# Patient Record
Sex: FEMALE | Race: WHITE | NOT HISPANIC OR LATINO | Employment: PART TIME | URBAN - METROPOLITAN AREA
[De-identification: names, ages, dates, MRNs, and addresses within clinical notes are randomized per-mention and may not be internally consistent; named-entity substitution may affect disease eponyms.]

---

## 2017-12-04 ENCOUNTER — HOSPITAL ENCOUNTER (OUTPATIENT)
Dept: RADIOLOGY | Facility: MEDICAL CENTER | Age: 72
End: 2017-12-04
Attending: FAMILY MEDICINE
Payer: MEDICARE

## 2017-12-04 DIAGNOSIS — Z12.31 ENCOUNTER FOR SCREENING MAMMOGRAM FOR MALIGNANT NEOPLASM OF BREAST: ICD-10-CM

## 2017-12-04 PROCEDURE — G0202 SCR MAMMO BI INCL CAD: HCPCS

## 2017-12-08 ENCOUNTER — HOSPITAL ENCOUNTER (OUTPATIENT)
Dept: RADIOLOGY | Facility: MEDICAL CENTER | Age: 72
End: 2017-12-08
Attending: FAMILY MEDICINE
Payer: MEDICARE

## 2017-12-08 DIAGNOSIS — R92.8 ABNORMAL MAMMOGRAM: ICD-10-CM

## 2017-12-08 PROCEDURE — G0206 DX MAMMO INCL CAD UNI: HCPCS | Mod: RT

## 2018-09-14 ENCOUNTER — HOSPITAL ENCOUNTER (OUTPATIENT)
Dept: RADIOLOGY | Facility: MEDICAL CENTER | Age: 73
End: 2018-09-14
Attending: FAMILY MEDICINE
Payer: MEDICARE

## 2018-09-14 DIAGNOSIS — M25.551 RIGHT HIP PAIN: ICD-10-CM

## 2018-09-14 PROCEDURE — 73502 X-RAY EXAM HIP UNI 2-3 VIEWS: CPT | Mod: RT

## 2018-09-18 ENCOUNTER — HOSPITAL ENCOUNTER (OUTPATIENT)
Dept: RADIOLOGY | Facility: MEDICAL CENTER | Age: 73
End: 2018-09-18
Attending: FAMILY MEDICINE
Payer: MEDICARE

## 2018-09-18 DIAGNOSIS — N95.0 POSTMENOPAUSAL BLEEDING: ICD-10-CM

## 2018-09-18 PROCEDURE — 76830 TRANSVAGINAL US NON-OB: CPT

## 2018-11-28 ENCOUNTER — APPOINTMENT (OUTPATIENT)
Dept: RADIOLOGY | Facility: MEDICAL CENTER | Age: 73
End: 2018-11-28
Attending: EMERGENCY MEDICINE
Payer: MEDICARE

## 2018-11-28 ENCOUNTER — HOSPITAL ENCOUNTER (EMERGENCY)
Facility: MEDICAL CENTER | Age: 73
End: 2018-11-29
Attending: EMERGENCY MEDICINE
Payer: MEDICARE

## 2018-11-28 DIAGNOSIS — S70.02XA CONTUSION OF LEFT HIP, INITIAL ENCOUNTER: ICD-10-CM

## 2018-11-28 DIAGNOSIS — S63.502A SPRAIN OF LEFT WRIST, INITIAL ENCOUNTER: ICD-10-CM

## 2018-11-28 PROCEDURE — 73552 X-RAY EXAM OF FEMUR 2/>: CPT | Mod: LT

## 2018-11-28 PROCEDURE — 99284 EMERGENCY DEPT VISIT MOD MDM: CPT

## 2018-11-28 PROCEDURE — 73130 X-RAY EXAM OF HAND: CPT | Mod: LT

## 2018-11-28 PROCEDURE — 700102 HCHG RX REV CODE 250 W/ 637 OVERRIDE(OP): Performed by: EMERGENCY MEDICINE

## 2018-11-28 PROCEDURE — A9270 NON-COVERED ITEM OR SERVICE: HCPCS | Performed by: EMERGENCY MEDICINE

## 2018-11-28 PROCEDURE — 73110 X-RAY EXAM OF WRIST: CPT | Mod: LT

## 2018-11-28 RX ORDER — ACETAMINOPHEN 325 MG/1
1000 TABLET ORAL ONCE
Status: COMPLETED | OUTPATIENT
Start: 2018-11-28 | End: 2018-11-28

## 2018-11-28 RX ADMIN — ACETAMINOPHEN 975 MG: 325 TABLET, FILM COATED ORAL at 22:46

## 2018-11-28 ASSESSMENT — PAIN SCALES - GENERAL: PAINLEVEL_OUTOF10: 9

## 2018-11-29 VITALS
TEMPERATURE: 99.1 F | HEIGHT: 63 IN | SYSTOLIC BLOOD PRESSURE: 134 MMHG | OXYGEN SATURATION: 98 % | BODY MASS INDEX: 24.41 KG/M2 | WEIGHT: 137.79 LBS | DIASTOLIC BLOOD PRESSURE: 64 MMHG | RESPIRATION RATE: 18 BRPM | HEART RATE: 70 BPM

## 2018-11-29 PROCEDURE — 72192 CT PELVIS W/O DYE: CPT

## 2018-11-29 ASSESSMENT — ENCOUNTER SYMPTOMS
NECK PAIN: 0
EYE REDNESS: 0
FALLS: 1
COUGH: 0
SEIZURES: 0
HEADACHES: 0
VOMITING: 0
FEVER: 0
SORE THROAT: 0
CHILLS: 0
FOCAL WEAKNESS: 0
SHORTNESS OF BREATH: 0
BACK PAIN: 0
BLURRED VISION: 0
ABDOMINAL PAIN: 0

## 2018-11-29 NOTE — PROGRESS NOTES
Patient given DC instructions and verbalized understanding. Patient wheeled out of ED in good condition.

## 2018-11-29 NOTE — DISCHARGE INSTRUCTIONS
You were seen in the Emergency Department for hip and wrist injury.    Xrays, CT scan were completed without significant acute abnormalities.    Please use 1,000mg of tylenol or 600mg of ibuprofen every 6 hours as needed for pain.    Please follow up with your primary care physician.    Return to the Emergency Department with worsening pain, inability to ambulate after 1-2 days, numbness or weakness in the lower extremity, or other concerns.

## 2018-11-29 NOTE — ED PROVIDER NOTES
ED Provider Note    CHIEF COMPLAINT  Chief Complaint   Patient presents with   • GLF       HPI  Alice Whitlock is a 73 y.o. female who presents with left hip and left wrist pain after mechanical ground-level fall this evening.  Patient states that she tripped on a piece of furniture which caused her to fall down onto her left side.  She denies head strike or loss of consciousness.  No headaches or chest pain before or after the fall.  No history of syncope.  She endorses an aching pain to her left hip and groin which radiates down the left leg.  She states she is having significant difficulty bearing weight on the left lower extremity.  She is not taking any medications prior to coming in for evaluation.      REVIEW OF SYSTEMS  See HPI for further details.   Review of Systems   Constitutional: Negative for chills and fever.   HENT: Negative for sore throat.    Eyes: Negative for blurred vision and redness.   Respiratory: Negative for cough and shortness of breath.    Cardiovascular: Negative for chest pain and leg swelling.   Gastrointestinal: Negative for abdominal pain and vomiting.   Genitourinary: Negative for dysuria and urgency.   Musculoskeletal: Positive for falls and joint pain. Negative for back pain and neck pain.   Skin: Negative for rash.   Neurological: Negative for focal weakness, seizures and headaches.   Psychiatric/Behavioral: Negative for suicidal ideas.         PAST MEDICAL HISTORY   has a past medical history of Arthritis; Cancer ([facility]) (2015); Heart burn; Pain; Psychiatric problem; Unspecified disorder of thyroid; and Unspecified urinary incontinence.    SOCIAL HISTORY  Social History     Social History Main Topics   • Smoking status: Never Smoker   • Smokeless tobacco: Never Used   • Alcohol use Yes      Comment: 1 per month   • Drug use: No   • Sexual activity: Not on file       SURGICAL HISTORY   has a past surgical history that includes other surgical procedure (1979); thyroidectomy  "total (1990); bladder suspension (2000); mammoplasty augmentation (1981); laparotomy (1972); tonsillectomy (1957); knee arthroscopy (1/18/2011); medial meniscectomy (1/18/2011); meniscectomy (1/18/2011); synovectomy (1/18/2011); knee arthroplasty total (11/6/2012); knee arthroplasty total (Left, 10/11/2016); and enlarge breast with implant (1980).    CURRENT MEDICATIONS  Home Medications    **Home medications have not yet been reviewed for this encounter**         ALLERGIES  Allergies   Allergen Reactions   • Food      Oranges, peaches   • Penicillins      Reaction unknown \"fever\"       PHYSICAL EXAM   VITAL SIGNS: /64   Pulse 70   Temp 37.3 °C (99.1 °F) (Temporal)   Resp 18   Ht 1.6 m (5' 3\")   Wt 62.5 kg (137 lb 12.6 oz)   SpO2 98%   BMI 24.41 kg/m²      Physical Exam   Constitutional: She is oriented to person, place, and time and well-developed, well-nourished, and in no distress. No distress.   Well-appearing elderly female   HENT:   Head: Normocephalic and atraumatic.   Eyes: Pupils are equal, round, and reactive to light. Conjunctivae are normal.   Neck: Normal range of motion. Neck supple.   Cardiovascular: Normal rate, regular rhythm and normal heart sounds.    2+ DP pulses bilaterally   Pulmonary/Chest: Effort normal and breath sounds normal. No respiratory distress.   Abdominal: Soft. She exhibits no distension. There is no tenderness.   Musculoskeletal: Normal range of motion. She exhibits no edema or tenderness.   Left hip atraumatic, overall good range of motion without significant pain.  Left wrist atraumatic, overall good range of motion without significant pain.   Neurological: She is alert and oriented to person, place, and time.   Moving all extremities spontaneously.  Motor and sensation intact distal to injury.   Skin: Skin is warm and dry.   Psychiatric: Affect normal.         DIAGNOSTIC STUDIES      RADIOLOGY  Personally reviewed by me  CT-PELVIS W/O   Final Result         1.  No " acute abnormality.   2.  Diverticulosis      DX-FEMUR-2+ LEFT   Final Result         1.  No radiographic evidence of acute traumatic injury.      DX-WRIST-COMPLETE 3+ LEFT   Final Result         1.  No acute traumatic bony injury.   2.  Severe degenerative changes of the first carpometacarpal joint      DX-HAND 3+ LEFT   Final Result         1.  No acute traumatic bony injury.   2.  Severe degenerative changes of the first carpometacarpal joint.          ED COURSE  Vitals:    11/28/18 2025 11/28/18 2247 11/29/18 0100 11/29/18 0144   BP: 143/72 (!) 162/60  134/64   Pulse: 75 83 71 70   Resp: 18 18 18   Temp:       TempSrc:       SpO2: 97% 94% 95% 98%   Weight:       Height:             Medications administered:  Medications   acetaminophen (TYLENOL) tablet 975 mg (975 mg Oral Given 11/28/18 2246)       MEDICAL DECISION MAKING  Relatively healthy elderly female who presents with left wrist and left hip pain after a ground-level mechanical fall this evening.  She is hyper tensive with otherwise reassuring vitals.  There is no history of head trauma or concern for intracranial hemorrhage or C-spine fracture.  No evidence of neurovascular compromise on exam.  X-rays do not demonstrate fracture dislocation of the left wrist or left hip or femur.  CT scan of the pelvis was completed as patient continued to have trouble bearing weight on the left lower extremity.  There is no evidence of occult pelvic fracture.  Suspect likely contusion or ligamentous injury.  Patient understands plan of care for discharge home.  She understands instructions on symptomatic care and if symptoms are not improving and she continues to have difficulty ambulating, she needs follow-up with her primary care physician or orthopedist for MRI of the left hip.  She also understands strict return precautions for changing or worsening symptoms.        IMPRESSION  (S70.02XA) Contusion of left hip, initial encounter  (S63.502A) Sprain of left wrist,  initial encounter    Disposition: Discharge home, stable condition  Results, diagnoses, and treatment options were discussed with the patient and/or family. Patient verbalized understanding of plan of care.    Patient referred to primary care provider for monitoring and treatment of blood pressure.      Discharge Medication List as of 11/29/2018  1:24 AM              Electronically signed by: Bee Westbrook, 11/28/2018 10:37 PM

## 2018-11-29 NOTE — ED TRIAGE NOTES
PAtient wheeled in by  for L medial upper thigh pain after she tripped at home and landed on her outstretched L hand and hip. Patient is unable to bear weight without much pain. Denies head injury, LOC, n/v, or blood thinners.

## 2018-12-13 ENCOUNTER — HOSPITAL ENCOUNTER (OUTPATIENT)
Dept: RADIOLOGY | Facility: MEDICAL CENTER | Age: 73
End: 2018-12-13
Attending: FAMILY MEDICINE
Payer: MEDICARE

## 2018-12-13 DIAGNOSIS — Z12.39 SCREENING BREAST EXAMINATION: ICD-10-CM

## 2018-12-13 PROCEDURE — 77067 SCR MAMMO BI INCL CAD: CPT

## 2018-12-18 ENCOUNTER — HOSPITAL ENCOUNTER (OUTPATIENT)
Dept: RADIOLOGY | Facility: MEDICAL CENTER | Age: 73
End: 2018-12-18
Attending: FAMILY MEDICINE
Payer: MEDICARE

## 2018-12-18 DIAGNOSIS — M25.552 LEFT HIP PAIN: ICD-10-CM

## 2018-12-18 PROCEDURE — 73502 X-RAY EXAM HIP UNI 2-3 VIEWS: CPT | Mod: LT

## 2019-01-14 ENCOUNTER — HOSPITAL ENCOUNTER (OUTPATIENT)
Dept: RADIOLOGY | Facility: MEDICAL CENTER | Age: 74
End: 2019-01-14
Attending: SPECIALIST | Admitting: SPECIALIST
Payer: MEDICARE

## 2019-01-14 DIAGNOSIS — Z01.810 PRE-OPERATIVE CARDIOVASCULAR EXAMINATION: ICD-10-CM

## 2019-01-14 DIAGNOSIS — Z01.811 PRE-OPERATIVE RESPIRATORY EXAMINATION: ICD-10-CM

## 2019-01-14 DIAGNOSIS — Z01.812 PRE-OPERATIVE LABORATORY EXAMINATION: ICD-10-CM

## 2019-01-14 LAB
ABO GROUP BLD: NORMAL
ALBUMIN SERPL BCP-MCNC: 4.5 G/DL (ref 3.2–4.9)
ALBUMIN/GLOB SERPL: 1.6 G/DL
ALP SERPL-CCNC: 95 U/L (ref 30–99)
ALT SERPL-CCNC: 18 U/L (ref 2–50)
ANION GAP SERPL CALC-SCNC: 6 MMOL/L (ref 0–11.9)
APTT PPP: 40.5 SEC (ref 24.7–36)
AST SERPL-CCNC: 19 U/L (ref 12–45)
BASOPHILS # BLD AUTO: 1.5 % (ref 0–1.8)
BASOPHILS # BLD: 0.09 K/UL (ref 0–0.12)
BILIRUB SERPL-MCNC: 0.6 MG/DL (ref 0.1–1.5)
BLD GP AB SCN SERPL QL: NORMAL
BUN SERPL-MCNC: 18 MG/DL (ref 8–22)
CALCIUM SERPL-MCNC: 10.1 MG/DL (ref 8.5–10.5)
CHLORIDE SERPL-SCNC: 102 MMOL/L (ref 96–112)
CO2 SERPL-SCNC: 27 MMOL/L (ref 20–33)
CREAT SERPL-MCNC: 0.71 MG/DL (ref 0.5–1.4)
EOSINOPHIL # BLD AUTO: 0.22 K/UL (ref 0–0.51)
EOSINOPHIL NFR BLD: 3.7 % (ref 0–6.9)
ERYTHROCYTE [DISTWIDTH] IN BLOOD BY AUTOMATED COUNT: 43.3 FL (ref 35.9–50)
GLOBULIN SER CALC-MCNC: 2.8 G/DL (ref 1.9–3.5)
GLUCOSE SERPL-MCNC: 108 MG/DL (ref 65–99)
HCT VFR BLD AUTO: 36.3 % (ref 37–47)
HGB BLD-MCNC: 11.7 G/DL (ref 12–16)
IMM GRANULOCYTES # BLD AUTO: 0.01 K/UL (ref 0–0.11)
IMM GRANULOCYTES NFR BLD AUTO: 0.2 % (ref 0–0.9)
INR PPP: 0.94 (ref 0.87–1.13)
LYMPHOCYTES # BLD AUTO: 1.34 K/UL (ref 1–4.8)
LYMPHOCYTES NFR BLD: 22.3 % (ref 22–41)
MCH RBC QN AUTO: 31.4 PG (ref 27–33)
MCHC RBC AUTO-ENTMCNC: 32.2 G/DL (ref 33.6–35)
MCV RBC AUTO: 97.3 FL (ref 81.4–97.8)
MONOCYTES # BLD AUTO: 0.51 K/UL (ref 0–0.85)
MONOCYTES NFR BLD AUTO: 8.5 % (ref 0–13.4)
NEUTROPHILS # BLD AUTO: 3.84 K/UL (ref 2–7.15)
NEUTROPHILS NFR BLD: 63.8 % (ref 44–72)
NRBC # BLD AUTO: 0 K/UL
NRBC BLD-RTO: 0 /100 WBC
PLATELET # BLD AUTO: 236 K/UL (ref 164–446)
PMV BLD AUTO: 9.4 FL (ref 9–12.9)
POTASSIUM SERPL-SCNC: 3.9 MMOL/L (ref 3.6–5.5)
PROT SERPL-MCNC: 7.3 G/DL (ref 6–8.2)
PROTHROMBIN TIME: 12.6 SEC (ref 12–14.6)
RBC # BLD AUTO: 3.73 M/UL (ref 4.2–5.4)
RH BLD: NORMAL
SODIUM SERPL-SCNC: 135 MMOL/L (ref 135–145)
WBC # BLD AUTO: 6 K/UL (ref 4.8–10.8)

## 2019-01-14 PROCEDURE — 36415 COLL VENOUS BLD VENIPUNCTURE: CPT

## 2019-01-14 PROCEDURE — 80053 COMPREHEN METABOLIC PANEL: CPT

## 2019-01-14 PROCEDURE — 93005 ELECTROCARDIOGRAM TRACING: CPT

## 2019-01-14 PROCEDURE — 86850 RBC ANTIBODY SCREEN: CPT

## 2019-01-14 PROCEDURE — 86901 BLOOD TYPING SEROLOGIC RH(D): CPT

## 2019-01-14 PROCEDURE — 85025 COMPLETE CBC W/AUTO DIFF WBC: CPT

## 2019-01-14 PROCEDURE — 93010 ELECTROCARDIOGRAM REPORT: CPT | Performed by: INTERNAL MEDICINE

## 2019-01-14 PROCEDURE — 85730 THROMBOPLASTIN TIME PARTIAL: CPT

## 2019-01-14 PROCEDURE — 71045 X-RAY EXAM CHEST 1 VIEW: CPT

## 2019-01-14 PROCEDURE — 86900 BLOOD TYPING SEROLOGIC ABO: CPT

## 2019-01-14 PROCEDURE — 85610 PROTHROMBIN TIME: CPT

## 2019-01-14 RX ORDER — GABAPENTIN 300 MG/1
300 CAPSULE ORAL EVERY EVENING
COMMUNITY
End: 2022-01-06 | Stop reason: SDUPTHER

## 2019-01-15 LAB — EKG IMPRESSION: NORMAL

## 2019-01-17 ENCOUNTER — HOSPITAL ENCOUNTER (OUTPATIENT)
Facility: MEDICAL CENTER | Age: 74
End: 2019-01-17
Attending: SPECIALIST | Admitting: SPECIALIST
Payer: MEDICARE

## 2019-01-17 VITALS
OXYGEN SATURATION: 100 % | BODY MASS INDEX: 24.3 KG/M2 | HEART RATE: 78 BPM | HEIGHT: 62 IN | DIASTOLIC BLOOD PRESSURE: 56 MMHG | TEMPERATURE: 99.2 F | WEIGHT: 132.06 LBS | RESPIRATION RATE: 16 BRPM | SYSTOLIC BLOOD PRESSURE: 137 MMHG

## 2019-01-17 LAB
ABO GROUP BLD: NORMAL
PATHOLOGY CONSULT NOTE: NORMAL
RH BLD: NORMAL

## 2019-01-17 PROCEDURE — 502714 HCHG ROBOTIC SURGERY SERVICES: Performed by: SPECIALIST

## 2019-01-17 PROCEDURE — 88305 TISSUE EXAM BY PATHOLOGIST: CPT

## 2019-01-17 PROCEDURE — 700111 HCHG RX REV CODE 636 W/ 250 OVERRIDE (IP)

## 2019-01-17 PROCEDURE — 160031 HCHG SURGERY MINUTES - 1ST 30 MINS LEVEL 5: Performed by: SPECIALIST

## 2019-01-17 PROCEDURE — 700102 HCHG RX REV CODE 250 W/ 637 OVERRIDE(OP): Performed by: ANESTHESIOLOGY

## 2019-01-17 PROCEDURE — 88307 TISSUE EXAM BY PATHOLOGIST: CPT

## 2019-01-17 PROCEDURE — 160002 HCHG RECOVERY MINUTES (STAT): Performed by: SPECIALIST

## 2019-01-17 PROCEDURE — 500854 HCHG NEEDLE, INSUFFLATION FOR STEP: Performed by: SPECIALIST

## 2019-01-17 PROCEDURE — 88309 TISSUE EXAM BY PATHOLOGIST: CPT

## 2019-01-17 PROCEDURE — 700104 HCHG RX REV CODE 254

## 2019-01-17 PROCEDURE — 88360 TUMOR IMMUNOHISTOCHEM/MANUAL: CPT | Mod: 91

## 2019-01-17 PROCEDURE — 700111 HCHG RX REV CODE 636 W/ 250 OVERRIDE (IP): Performed by: ANESTHESIOLOGY

## 2019-01-17 PROCEDURE — 160035 HCHG PACU - 1ST 60 MINS PHASE I: Performed by: SPECIALIST

## 2019-01-17 PROCEDURE — 160009 HCHG ANES TIME/MIN: Performed by: SPECIALIST

## 2019-01-17 PROCEDURE — 88331 PATH CONSLTJ SURG 1 BLK 1SPC: CPT

## 2019-01-17 PROCEDURE — 502779 HCHG SUTURE, QUILL: Performed by: SPECIALIST

## 2019-01-17 PROCEDURE — 160025 RECOVERY II MINUTES (STATS): Performed by: SPECIALIST

## 2019-01-17 PROCEDURE — 501582 HCHG TROCAR, THRD BLADED: Performed by: SPECIALIST

## 2019-01-17 PROCEDURE — 160048 HCHG OR STATISTICAL LEVEL 1-5: Performed by: SPECIALIST

## 2019-01-17 PROCEDURE — A9270 NON-COVERED ITEM OR SERVICE: HCPCS | Performed by: ANESTHESIOLOGY

## 2019-01-17 PROCEDURE — 700111 HCHG RX REV CODE 636 W/ 250 OVERRIDE (IP): Performed by: SPECIALIST

## 2019-01-17 PROCEDURE — 160046 HCHG PACU - 1ST 60 MINS PHASE II: Performed by: SPECIALIST

## 2019-01-17 PROCEDURE — 500864 HCHG NEEDLE, SPINAL 18G: Performed by: SPECIALIST

## 2019-01-17 PROCEDURE — 700101 HCHG RX REV CODE 250

## 2019-01-17 PROCEDURE — 160036 HCHG PACU - EA ADDL 30 MINS PHASE I: Performed by: SPECIALIST

## 2019-01-17 PROCEDURE — 88112 CYTOPATH CELL ENHANCE TECH: CPT

## 2019-01-17 PROCEDURE — 501838 HCHG SUTURE GENERAL: Performed by: SPECIALIST

## 2019-01-17 PROCEDURE — 160042 HCHG SURGERY MINUTES - EA ADDL 1 MIN LEVEL 5: Performed by: SPECIALIST

## 2019-01-17 RX ORDER — GABAPENTIN 300 MG/1
300 CAPSULE ORAL ONCE
Status: DISCONTINUED | OUTPATIENT
Start: 2019-01-17 | End: 2019-01-17 | Stop reason: HOSPADM

## 2019-01-17 RX ORDER — METOPROLOL TARTRATE 1 MG/ML
1 INJECTION, SOLUTION INTRAVENOUS
Status: DISCONTINUED | OUTPATIENT
Start: 2019-01-17 | End: 2019-01-17 | Stop reason: HOSPADM

## 2019-01-17 RX ORDER — HALOPERIDOL 5 MG/ML
1 INJECTION INTRAMUSCULAR
Status: DISCONTINUED | OUTPATIENT
Start: 2019-01-17 | End: 2019-01-17 | Stop reason: HOSPADM

## 2019-01-17 RX ORDER — BUPIVACAINE HYDROCHLORIDE AND EPINEPHRINE 2.5; 5 MG/ML; UG/ML
INJECTION, SOLUTION EPIDURAL; INFILTRATION; INTRACAUDAL; PERINEURAL
Status: DISCONTINUED | OUTPATIENT
Start: 2019-01-17 | End: 2019-01-17 | Stop reason: HOSPADM

## 2019-01-17 RX ORDER — OXYCODONE HCL 5 MG/5 ML
5 SOLUTION, ORAL ORAL
Status: COMPLETED | OUTPATIENT
Start: 2019-01-17 | End: 2019-01-17

## 2019-01-17 RX ORDER — HYDRALAZINE HYDROCHLORIDE 20 MG/ML
5 INJECTION INTRAMUSCULAR; INTRAVENOUS
Status: DISCONTINUED | OUTPATIENT
Start: 2019-01-17 | End: 2019-01-17 | Stop reason: HOSPADM

## 2019-01-17 RX ORDER — ROSUVASTATIN CALCIUM 10 MG/1
10 TABLET, COATED ORAL EVERY EVENING
COMMUNITY
End: 2022-01-06

## 2019-01-17 RX ORDER — HYDROMORPHONE HYDROCHLORIDE 1 MG/ML
0.2 INJECTION, SOLUTION INTRAMUSCULAR; INTRAVENOUS; SUBCUTANEOUS
Status: DISCONTINUED | OUTPATIENT
Start: 2019-01-17 | End: 2019-01-17 | Stop reason: HOSPADM

## 2019-01-17 RX ORDER — OXYCODONE HCL 5 MG/5 ML
10 SOLUTION, ORAL ORAL
Status: COMPLETED | OUTPATIENT
Start: 2019-01-17 | End: 2019-01-17

## 2019-01-17 RX ORDER — ACETAMINOPHEN 500 MG
1000 TABLET ORAL ONCE
Status: DISCONTINUED | OUTPATIENT
Start: 2019-01-17 | End: 2019-01-17 | Stop reason: HOSPADM

## 2019-01-17 RX ORDER — MIDAZOLAM HYDROCHLORIDE 1 MG/ML
1 INJECTION INTRAMUSCULAR; INTRAVENOUS
Status: DISCONTINUED | OUTPATIENT
Start: 2019-01-17 | End: 2019-01-17 | Stop reason: HOSPADM

## 2019-01-17 RX ORDER — SODIUM CHLORIDE, SODIUM LACTATE, POTASSIUM CHLORIDE, CALCIUM CHLORIDE 600; 310; 30; 20 MG/100ML; MG/100ML; MG/100ML; MG/100ML
INJECTION, SOLUTION INTRAVENOUS CONTINUOUS
Status: DISCONTINUED | OUTPATIENT
Start: 2019-01-17 | End: 2019-01-17 | Stop reason: HOSPADM

## 2019-01-17 RX ORDER — HYDROMORPHONE HYDROCHLORIDE 1 MG/ML
0.4 INJECTION, SOLUTION INTRAMUSCULAR; INTRAVENOUS; SUBCUTANEOUS
Status: DISCONTINUED | OUTPATIENT
Start: 2019-01-17 | End: 2019-01-17 | Stop reason: HOSPADM

## 2019-01-17 RX ORDER — SODIUM CHLORIDE, SODIUM LACTATE, POTASSIUM CHLORIDE, CALCIUM CHLORIDE 600; 310; 30; 20 MG/100ML; MG/100ML; MG/100ML; MG/100ML
INJECTION, SOLUTION INTRAVENOUS ONCE
Status: COMPLETED | OUTPATIENT
Start: 2019-01-17 | End: 2019-01-17

## 2019-01-17 RX ORDER — INDOCYANINE GREEN AND WATER 25 MG
KIT INJECTION
Status: DISCONTINUED | OUTPATIENT
Start: 2019-01-17 | End: 2019-01-17 | Stop reason: HOSPADM

## 2019-01-17 RX ORDER — ONDANSETRON 2 MG/ML
4 INJECTION INTRAMUSCULAR; INTRAVENOUS
Status: COMPLETED | OUTPATIENT
Start: 2019-01-17 | End: 2019-01-17

## 2019-01-17 RX ORDER — HYDROMORPHONE HYDROCHLORIDE 1 MG/ML
0.1 INJECTION, SOLUTION INTRAMUSCULAR; INTRAVENOUS; SUBCUTANEOUS
Status: DISCONTINUED | OUTPATIENT
Start: 2019-01-17 | End: 2019-01-17 | Stop reason: HOSPADM

## 2019-01-17 RX ADMIN — ONDANSETRON 4 MG: 2 INJECTION INTRAMUSCULAR; INTRAVENOUS at 14:53

## 2019-01-17 RX ADMIN — SODIUM CHLORIDE, SODIUM LACTATE, POTASSIUM CHLORIDE, CALCIUM CHLORIDE: 600; 310; 30; 20 INJECTION, SOLUTION INTRAVENOUS at 09:31

## 2019-01-17 RX ADMIN — OXYCODONE HYDROCHLORIDE 10 MG: 5 SOLUTION ORAL at 13:55

## 2019-01-17 RX ADMIN — LIDOCAINE HYDROCHLORIDE 0.5 ML: 10 INJECTION, SOLUTION EPIDURAL; INFILTRATION; INTRACAUDAL; PERINEURAL at 09:31

## 2019-01-17 ASSESSMENT — PAIN SCALES - GENERAL
PAINLEVEL_OUTOF10: 7
PAINLEVEL_OUTOF10: 4
PAINLEVEL_OUTOF10: 5
PAINLEVEL_OUTOF10: 2
PAINLEVEL_OUTOF10: ASSUMED PAIN PRESENT
PAINLEVEL_OUTOF10: 5

## 2019-01-17 NOTE — PROGRESS NOTES
Patient in pre-op, assessment completed, patient and family updated on plan of care, all questions answered, no further needs at this time, call light within reach.

## 2019-01-17 NOTE — OR NURSING
Pt arrived to phase II. Pt states that she feels the urge to use the restroom. Pt up to the bathroom but not able to void. Pulse ox moved to ear with better readings. IV fluids infusing, pt resting with the lights low in the reclining chair.  at bedside, POC discussed.

## 2019-01-17 NOTE — DISCHARGE INSTRUCTIONS
ACTIVITY: Rest and take it easy for the first 24 hours.  A responsible adult is recommended to remain with you during that time.  It is normal to feel sleepy.  We encourage you to not do anything that requires balance, judgment or coordination.    MILD FLU-LIKE SYMPTOMS ARE NORMAL. YOU MAY EXPERIENCE GENERALIZED MUSCLE ACHES, THROAT IRRITATION, HEADACHE AND/OR SOME NAUSEA.    FOR 24 HOURS DO NOT:  Drive, operate machinery or run household appliances.  Drink beer or alcoholic beverages.   Make important decisions or sign legal documents.    SPECIAL INSTRUCTIONS:     Pelvic rest for 6 weeks.  No heavy lifting for 6 weeks.  May shower tomorrow.  May removed dressings tomorrow and replace with band-aids daily as needed.   Keep incisions clean and dry.      Laparoscopically Assisted Vaginal Hysterectomy  A laparoscopically assisted vaginal hysterectomy (LAVH) is a surgical procedure to remove the uterus and cervix, and sometimes the ovaries and fallopian tubes. During an LAVH, some of the surgical removal is done through the vagina, and the rest is done through a few small surgical cuts (incisions) in the abdomen.  This procedure is usually considered in women when a vaginal hysterectomy is not an option. Your health care provider will discuss the risks and benefits of the different surgical techniques at your appointment. Generally, recovery time is faster and there are fewer complications after laparoscopic procedures than after open incisional procedures.  Tell a health care provider about:  · Any allergies you have.  · All medicines you are taking, including vitamins, herbs, eye drops, creams, and over-the-counter medicines.  · Any problems you or family members have had with anesthetic medicines.  · Any blood disorders you have.  · Any surgeries you have had.  · Any medical conditions you have.  What are the risks?  Generally, this is a safe procedure. However, as with any procedure, complications can occur.  Possible complications include:  · Allergies to medicines.  · Difficulty breathing.  · Bleeding.  · Infection.  · Damage to other structures near your uterus and cervix.  What happens before the procedure?  · Ask your health care provider about changing or stopping your regular medicines.  · Take certain medicines, such as a colon-emptying preparation, as directed.  · Do not eat or drink anything for at least 8 hours before your surgery.  · Stop smoking if you smoke. Stopping will improve your health after surgery.  · Arrange for a ride home after surgery and for help at home during recovery.  What happens during the procedure?  · An IV tube will be put into one of your veins in order to give you fluids and medicines.  · You will receive medicines to relax you and medicines that make you sleep (general anesthetic).  · You may have a flexible tube (catheter) put into your bladder to drain urine.  · You may have a tube put through your nose or mouth that goes into your stomach (nasogastric tube). The nasogastric tube removes digestive fluids and prevents you from feeling nauseated and from vomiting.  · Tight-fitting (compression) stockings will be placed on your legs to promote circulation.  · Three to four small incisions will be made in your abdomen. An incision also will be made in your vagina. Probes and tools will be inserted into the small incisions. The uterus and cervix are removed (and possibly your ovaries and fallopian tubes) through your vagina as well as through the small incisions that were made in the abdomen.  · Your vagina is then sewn back to normal.  What happens after the procedure?  · You may have a liquid diet temporarily. You will most likely return to, and tolerate, your usual diet the day after surgery.  · You will be passing urine through a catheter. It will be removed the day after surgery.  · Your temperature, breathing rate, heart rate, blood pressure, and oxygen level will be monitored  regularly.  · You will still wear compression stockings on your legs until you are able to move around.  · You will use a special device or do breathing exercises to keep your lungs clear.  · You will be encouraged to walk as soon as possible.      DIET: To avoid nausea, slowly advance diet as tolerated, avoiding spicy or greasy foods for the first day.  Add more substantial food to your diet according to your physician's instructions.  INCREASE FLUIDS AND FIBER TO AVOID CONSTIPATION.    SURGICAL DRESSING/BATHING: Follow instructions given to you by MD. Call with any questions.     FOLLOW-UP APPOINTMENT:  A follow-up appointment should be arranged with your doctor in 1-2 weeks; call to schedule.    You should CALL YOUR PHYSICIAN if you develop:  Fever greater than 101 degrees F.  Pain not relieved by medication, or persistent nausea or vomiting.  Excessive bleeding (blood soaking through dressing) or unexpected drainage from the wound.  Extreme redness or swelling around the incision site, drainage of pus or foul smelling drainage.  Inability to urinate or empty your bladder within 8 hours.  Problems with breathing or chest pain.    You should call 911 if you develop problems with breathing or chest pain.  If you are unable to contact your doctor or surgical center, you should go to the nearest emergency room or urgent care center.  Physician's telephone #: [phone]    If any questions arise, call your doctor.  If your doctor is not available, please feel free to call the Surgical Center at [phone].  The Center is open Monday through Friday from 7AM to 7PM.  You can also call the [facility] HOTLINE open 24 hours/day, 7 days/week and speak to a nurse at [phone], or toll free at [phone].    A registered nurse may call you a few days after your surgery to see how you are doing after your procedure.    MEDICATIONS: Resume taking daily medication.  Take prescribed pain medication with food.  If no  medication is prescribed, you may take non-aspirin pain medication if needed.  PAIN MEDICATION CAN BE VERY CONSTIPATING.  Take a stool softener or laxative such as senokot, pericolace, or milk of magnesia if needed.    Prescriptions at home.  Last pain medication given at 2:00pm.    If your physician has prescribed pain medication that includes Acetaminophen (Tylenol), do not take additional Acetaminophen (Tylenol) while taking the prescribed medication.    Depression / Suicide Risk    As you are discharged from this [facility] facility, it is important to learn how to keep safe from harming yourself.    Recognize the warning signs:  · Abrupt changes in personality, positive or negative- including increase in energy   · Giving away possessions  · Change in eating patterns- significant weight changes-  positive or negative  · Change in sleeping patterns- unable to sleep or sleeping all the time   · Unwillingness or inability to communicate  · Depression  · Unusual sadness, discouragement and loneliness  · Talk of wanting to die  · Neglect of personal appearance   · Rebelliousness- reckless behavior  · Withdrawal from people/activities they love  · Confusion- inability to concentrate     If you or a loved one observes any of these behaviors or has concerns about self-harm, here's what you can do:  · Talk about it- your feelings and reasons for harming yourself  · Remove any means that you might use to hurt yourself (examples: pills, rope, extension cords, firearm)  · Get professional help from the community (Mental Health, Substance Abuse, psychological counseling)  · Do not be alone:Call your Safe Contact- someone whom you trust who will be there for you.  · Call your local CRISIS HOTLINE 304-6911 or 129-331-3323  · Call your local Children's Mobile Crisis Response Team [place] (859) 537-6133 or www.NetSpark  · Call the toll free National Suicide Prevention Hotlines   · National Suicide Prevention  Lifeline 922-952-TZOS (8692)  · National [place] Network 800-SUICIDE (149-7309)

## 2019-01-17 NOTE — OR SURGEON
Immediate Post OP Note    PreOp Diagnosis: grade 1 endometrial cancer     PostOp Diagnosis: same    Procedure(s):  HYSTERECTOMY ROBOTIC XI - Wound Class: Clean Contaminated  SALPINGECTOMY - Wound Class: Clean Contaminated  OOPHORECTOMY - Wound Class: Clean Contaminated  NODE BIOPSY SENTINEL - Wound Class: Clean Contaminated    Surgeon(s):  Shahid Paez M.D.    Anesthesiologist/Type of Anesthesia:  Anesthesiologist: Gerard Calles D.O./General    Surgical Staff:  Circulator: Earlene Morrow R.N.  Relief Circulator: Shwetha Sy R.N.  Relief Scrub: Shwetha Mitchell  Scrub Person: Naga Juárez R.N.; Lizz Johnson    Specimens removed if any:  ID Type Source Tests Collected by Time Destination   A : uterus, bilateral tubes and ovaries, cervix Tissue Uterus PATHOLOGY SPECIMEN Shahid Paez M.D. 1/17/2019 12:56 PM    B : left obturator sentinel lymph node Tissue Lymph Node PATHOLOGY SPECIMEN Shahid Paez M.D. 1/17/2019 12:56 PM    C : right external iliac lymph node Tissue Lymph Node PATHOLOGY SPECIMEN Shahid Paez M.D. 1/17/2019  1:19 PM        Estimated Blood Loss: 50 cc    Findings: normal uterus and adnexa, left obturator and right external iliac node    Complications: none        1/17/2019 1:48 PM Shahid Paez M.D.

## 2019-01-18 NOTE — OR NURSING
Pt able to void without difficulty. D/C instructions given to pt and family, verbalized understanding. PIV removed prior to D/C.

## 2019-02-13 ENCOUNTER — HOSPITAL ENCOUNTER (OUTPATIENT)
Dept: RADIOLOGY | Facility: MEDICAL CENTER | Age: 74
End: 2019-02-13
Attending: SPECIALIST
Payer: MEDICARE

## 2019-02-13 DIAGNOSIS — C54.1 ENDOMETRIAL SARCOMA (HCC): ICD-10-CM

## 2019-02-13 PROCEDURE — 700117 HCHG RX CONTRAST REV CODE 255: Performed by: SPECIALIST

## 2019-02-13 PROCEDURE — 74177 CT ABD & PELVIS W/CONTRAST: CPT

## 2019-02-13 RX ADMIN — IOHEXOL 100 ML: 350 INJECTION, SOLUTION INTRAVENOUS at 14:12

## 2019-02-13 RX ADMIN — IOHEXOL 50 ML: 240 INJECTION, SOLUTION INTRATHECAL; INTRAVASCULAR; INTRAVENOUS; ORAL at 14:12

## 2019-02-20 NOTE — OP REPORT
DATE OF SERVICE:  01/17/2019    PREOPERATIVE DIAGNOSIS:  Grade I endometrial adenocarcinoma.    POSTOPERATIVE DIAGNOSIS:  Grade I endometrial adenocarcinoma.    PROCEDURE PERFORMED:  1.  Robotic-assisted hysterectomy with bilateral salpingo-oophorectomy.  2.  Robotic-assisted sentinel lymph node sampling.  3.  Bayard lymph node mapping.    SURGEON:  Shahid Paez MD    ASSISTANT:  Emma Michaels PA-C    ANESTHESIA:  General.    ANESTHESIOLOGIST:  Gerard Calles DO    ESTIMATED BLOOD LOSS:  Minimal.    FLUIDS:  Per Dr. Calles.    URINE OUTPUT:  As per Dr. Calles.    COMPLICATIONS:  None.    COUNTS:  Final sponge and needle counts correct.    INDICATIONS FOR SURGERY:  The patient is a very pleasant 73-year-old female   referred to me because of the recent diagnosis of grade I endometrial   carcinoma.  Patient was advised to undergo definitive surgical therapy.  In   addition, patient was advised to undergo sentinel lymph node sampling   depending upon intraoperative findings and consent was taken as clinically   indicated.  Risks, benefits, and rationale of the procedures were reviewed   with the patient in detail.  Patient is understanding of these risks and   wished to proceed with the surgery as planned.    INTRAOPERATIVE FINDINGS:  1.  No evidence of ascites.  2.  Normal right and left diaphragm.  Liver capsule smooth.  Stomach appeared   grossly normal.  Abdominal peritoneal surfaces were unremarkable.  Omentum   appeared grossly normal.  3.  In the pelvis, uterus was of normal size.  The adnexal structures were   unremarkable.  There was no seeding along the bladder, pelvic and cul-de-sac   peritoneum.    Bayard lymph node mapping revealed that the sentinel lymph node on the left   side was noted to be in the left obturator fossa.  While on the right side,   right external iliac lymph node, this was asymmetric.    DESCRIPTION OF PROCEDURE:  Patient was given IV antibiotics prior to   procedure.   Patient was prepped and draped and placed in modified dorsal   lithotomy position.  We initially began with sentinel lymph node mapping.  I   performed the examination under anesthesia.  Heavy-weighted speculum was   placed in the posterior vagina.  Zurita retractor was placed in the anterior   vagina.  The anterior lip of the cervix was brought through with a   single-tooth tenaculum.  I then injected 2 mL of indocyanine ___ at 3 and 9   o'clock position, depth of about 2-3 mm.  After completion of this, I then   turned my focus towards the robotic portion.    A 1 cm supraumbilical incision was made.  A Veress needle was inserted without   difficulty.  Pneumoperitoneum was achieved to the abdominal pressure of 15   mmHg.  A 12 mm trocar was inserted without difficulty.  After completion of   this, a 12 mm trocar was placed in the left lower quadrant two fingerbreadths   medial to the anterior superior iliac spine under direct laparoscopic   visualization.  After completion of this, a laparoscope was then placed in the   left lower quadrant port to assist in the placement of the remainder of the   da Ángel ports.  Two 8 mm ports were placed in the right upper quadrant 8 cm   apart while one 8 mm port was placed in the left upper quadrant 8 cm apart.    After completion of this, the patient was placed in steep Trendelenburg   position.  The robotic system was then docked and after docking the robotic   system, the instrumentation was inserted under direct laparoscopic   visualization to insure that there was no injury to the abdominal contents.    Once this was completed, the robotic camera was then docked.  We then   proceeded with our da Ángel portion of the procedure.    After completion of this, I did open up the right and left posterior broad   leaf ligament.  The perivesical and perirectal spaces were respectively   created.  Firefly was then turned on and I then identified the sentinel lymph   node, which was  harvested, skeletonized and resected and sent for pathological   analysis.  While waiting for that, I proceeded on with the hysterectomy with   bilateral salpingo-oophorectomy.    The posterior leaf of the broad ligament was incised.  The avascular space of   Graves was then created.  The right and left pelvic ureters were identified.    The ovarian vessels were then subsequently isolated and bipolar cautery was   used to cauterize the right and left IP and subsequently divided.  The medial   leaf of the peritoneum was then incised down to the level of the uterosacral   ligament.  Ureters were dissected laterally.  Uterine artery and vein were   then subsequently skeletonized.  Using the bipolar cautery, the uterine artery   and vein were then cauterized juxtaposition to the fundus of the uterus.  The   remainder of the lower uterine segment was likewise divided.  The uterosacral   ligaments were then independently divided.  Great care was then taken to   clearly not injure the ureter.  After the uterosacral ligaments were then   divided, the anterior branches of the uterine vessels were then subsequently   skeletonized and cauterized with bipolar cautery and divided.  The bladder   peritoneum was then subsequently taken down.  Once we were assured that the   bladder was dissected off the paracervical fascia, an anterior colpotomy was   made.  The vagina was circumferentially incised to complete the hysterectomy   and BSO.  The specimen was removed through the vaginal vault without   difficulty.  The vaginal cuff was then closed with O Quill PDS suture in 2   layer running fashion.    After completion of this, we then copiously irrigated pelvis with water.    Hemostasis was established.  ___ pathology.  After completion of this, the   pathology report showed no obvious invasive cancer, thus we did not proceed   with surgical staging.    At this point in time, we counted for sponges, needles and instrument counts.     Once this was accounted for, robotic system was then de-docked.    Pneumoperitoneum was allowed to escape through the 8 mm port.  Subcutaneous   fat was copiously irrigated with water.  The skin was reapproximated with 3-0   Monocryl sutures.    The patient tolerated the procedure well without any difficulties and was   subsequently transferred to the PACU in stable condition, extubated.       ____________________________________     MD SAMIRA BRUMFIELD / NTS    DD:  02/20/2019 00:22:12  DT:  02/20/2019 03:32:30    D#:  [id number]  Job#:  [id number]    cc: VENTURA CORCORAN MD

## 2019-03-05 ENCOUNTER — HOSPITAL ENCOUNTER (OUTPATIENT)
Dept: RADIATION ONCOLOGY | Facility: MEDICAL CENTER | Age: 74
End: 2019-03-31
Attending: RADIOLOGY
Payer: MEDICARE

## 2019-03-05 VITALS
OXYGEN SATURATION: 97 % | WEIGHT: 134.9 LBS | DIASTOLIC BLOOD PRESSURE: 64 MMHG | HEART RATE: 65 BPM | BODY MASS INDEX: 24.67 KG/M2 | TEMPERATURE: 98.9 F | SYSTOLIC BLOOD PRESSURE: 129 MMHG

## 2019-03-05 PROCEDURE — 99205 OFFICE O/P NEW HI 60 MIN: CPT | Mod: 25 | Performed by: RADIOLOGY

## 2019-03-05 PROCEDURE — 49411 INS MARK ABD/PEL FOR RT PERQ: CPT | Performed by: RADIOLOGY

## 2019-03-05 PROCEDURE — 99214 OFFICE O/P EST MOD 30 MIN: CPT | Mod: 25 | Performed by: RADIOLOGY

## 2019-03-05 PROCEDURE — A4648 IMPLANTABLE TISSUE MARKER: HCPCS | Performed by: RADIOLOGY

## 2019-03-05 ASSESSMENT — PAIN SCALES - GENERAL: PAINLEVEL: 1=MINIMAL PAIN

## 2019-03-05 NOTE — CONSULTS
RADIATION ONCOLOGY CONSULT    DATE OF SERVICE: 3/5/2019    IDENTIFICATION: A 73 y.o. female with stage Ib, grade 1, endometrial adenocarcinoma.  She is here at the kind request of Dr. Alonso for consideration of adjuvant vaginal brachytherapy.    HISTORY OF PRESENT ILLNESS: Patient presented with vaginal spotting over a years duration.  She was referred for GYN evaluation and underwent transvaginal ultrasound followed by endometrial biopsy.  Biopsy demonstrated well-differentiated endometrioid adenocarcinoma grade 1.  She was subsequently referred to Dr. Paez recommended hysterectomy and performed a robotic assisted hysterectomy, bilateral salpingo-oophorectomy, and sentinel node biopsy.  Pathology demonstrated Grade 1 endometrioid adenocarcinoma with deep myometrial invasion involving 66% of the myometrium.  Focal lymphovascular invasion was identified.  3 pelvic lymph nodes were without evidence of metastatic disease.  Tumor was ER TX +95 and 75% respectively.    She is now 7 weeks postop overall doing well.  Denies any bowel or bladder dysfunction.  She denies vaginal bleeding.    PAST MEDICAL HISTORY:   Past Medical History:   Diagnosis Date   • Arthritis     osteoarthritis   • Cancer (HCC) 2015    skin (non melanoma)   • Heart burn     occasional   • LBBB (left bundle branch block)     no cardiologist    • Pain     knee,  joints   • Psychiatric problem     depression   • Unspecified disorder of thyroid     thyroid nodules=thyroidectomy   • Unspecified urinary incontinence        PAST SURGICAL HISTORY:  Past Surgical History:   Procedure Laterality Date   • HYSTERECTOMY ROBOTIC XI  1/17/2019    Procedure: HYSTERECTOMY ROBOTIC XI;  Surgeon: Shahid Paez M.D.;  Location: [facility];  Service: Gynecology   • SALPINGECTOMY Bilateral 1/17/2019    Procedure: SALPINGECTOMY;  Surgeon: Shahid Paez M.D.;  Location: [facility];  Service: Gynecology   • OOPHORECTOMY Bilateral 1/17/2019    Procedure:  OOPHORECTOMY;  Surgeon: Shahid Paez M.D.;  Location: [facility];  Service: Gynecology   • NODE BIOPSY SENTINEL  1/17/2019    Procedure: NODE BIOPSY SENTINEL;  Surgeon: Shahid Paez M.D.;  Location: SUR[facility]ERY [facility];  Service: Gynecology   • KNEE ARTHROPLASTY TOTAL Left 10/11/2016    Procedure: KNEE ARTHROPLASTY TOTAL;  Surgeon: Beverly Chauhan M.D.;  Location: [facility];  Service:    • KNEE ARTHROPLASTY TOTAL  11/6/2012    Performed by Beverly Chauhan M.D. at [facility]   • KNEE ARTHROSCOPY  1/18/2011    Performed by MARILEE NAGEL at [facility]   • MEDIAL MENISCECTOMY  1/18/2011    Performed by MARILEE NAGEL at [facility]   • MENISCECTOMY  1/18/2011    Performed by MARILEE NAGEL at [facility]   • SYNOVECTOMY  1/18/2011    Performed by MARILEE NAGEL at [facility]   • BLADDER SUSPENSION  2000   • THYROIDECTOMY TOTAL  1990   • MAMMOPLASTY AUGMENTATION  1981   • PB ENLARGE BREAST WITH IMPLANT  1980   • OTHER SURGICAL PROCEDURE  1979    excision thyroid nodules   • LAPAROTOMY  1972    endometriosis   • TONSILLECTOMY  1957       CURRENT MEDICATIONS:  Current Outpatient Prescriptions   Medication Sig Dispense Refill   • rosuvastatin (CRESTOR) 10 MG Tab Take 10 mg by mouth every evening.     • gabapentin (NEURONTIN) 300 MG Cap Take 300 mg by mouth every evening.     • Coenzyme Q10 (COQ-10) 100 MG Cap Take 1 Cap by mouth every day.     • MAGNESIUM-POTASSIUM PO Take 1 Tab by mouth every day. 300/40mg   With potasssium     • Calcium Acetate, Phos Binder, (CALCIUM ACETATE PO) Take 1 Tab by mouth every day.     • Cyanocobalamin (VITAMIN B-12 PO) Take 1 Tab by mouth every day.     • meloxicam (MOBIC) 15 MG tablet Take 15 mg by mouth every day.     • vitamin D, Ergocalciferol, (DRISDOL) 79092 UNITS Cap capsule Take 50,000 Units by mouth every 7 days.     • levothyroxine (LEVOXYL) 100 MCG TABS Take 100 mcg by  mouth every day.     • sertraline (ZOLOFT) 50 MG TABS Take 50 mg by mouth every day.       No current facility-administered medications for this encounter.        ALLERGIES:    Penicillins    FAMILY HISTORY:    family history includes Cancer in her maternal aunt and unknown relative; Heart Disease in her unknown relative; Hypertension in her unknown relative.    SOCIAL HISTORY:     reports that she has never smoked. She has never used smokeless tobacco. She reports that she drinks alcohol. She reports that she does not use drugs.    REVIEW OF SYSTEMS:  Review of systems for today's date of service was reviewed and uploaded into the electronic medical record.       GYNECOLOGICAL STATUS:  : 1, Para: 1, Number of Interrupted Pregnancies: 0 and Age at first birth: 18    HORMONE USE:  Contraceptive hormone use 5 years and Post-menopause use 1 month years    PHYSICAL EXAM:   ECOG PERFORMANCE STATUS:  0= Fully active, able to carry on all pre-disease performance without restriction.  /64   Pulse 65   Temp 37.2 °C (98.9 °F)   Wt 61.2 kg (134 lb 14.4 oz)   SpO2 97%   BMI 24.67 kg/m²    GENERAL: Alert, oriented, no acute distress.  HEENT:  Pupils are equal, round, and reactive to light.  Extraocular muscles   are intact. Sclerae nonicteric.  Conjunctivae pink.  Oral cavity, tongue   protrudes midline.   NECK:  Supple without evidence of thyromegaly.  NODES:  No peripheral adenopathy of the neck, supraclavicular fossa or axillae   bilaterally.  LUNGS:  Clear to ascultation and resonant to percussion.  HEART:  Regular rate and rhythm.  No murmur appreciated  ABDOMEN:  Soft. No evidence of hepatosplenomegaly.  Positive bowel sounds.  EXTREMITIES:  Without Edema.  PELVIC: Vaginal mucosa pink vaginal cuff intact no palpable or visible abnormality.  3 gold fiducial markers inserted into the vaginal cuff to aid in the placement of vaginal brachytherapy.  NEUROLOGIC:  Cranial nerves II through XII were intact.   Strength is 5/5 in   lower extremities bilaterally.  DTRs were symmetrical.  There was no focal   sensory deficit appreciated.    LABORATORY DATA:   Lab Results   Component Value Date/Time    SODIUM 135 01/14/2019 11:05 AM    POTASSIUM 3.9 01/14/2019 11:05 AM    CHLORIDE 102 01/14/2019 11:05 AM    CO2 27 01/14/2019 11:05 AM    GLUCOSE 108 (H) 01/14/2019 11:05 AM    BUN 18 01/14/2019 11:05 AM    CREATININE 0.71 01/14/2019 11:05 AM     Lab Results   Component Value Date/Time    ALKPHOSPHAT 95 01/14/2019 11:05 AM    ASTSGOT 19 01/14/2019 11:05 AM    ALTSGPT 18 01/14/2019 11:05 AM    TBILIRUBIN 0.6 01/14/2019 11:05 AM      Lab Results   Component Value Date/Time    WBC 6.0 01/14/2019 11:05 AM    RBC 3.73 (L) 01/14/2019 11:05 AM    HEMOGLOBIN 11.7 (L) 01/14/2019 11:05 AM    HEMATOCRIT 36.3 (L) 01/14/2019 11:05 AM    MCV 97.3 01/14/2019 11:05 AM    MCH 31.4 01/14/2019 11:05 AM    MCHC 32.2 (L) 01/14/2019 11:05 AM    MPV 9.4 01/14/2019 11:05 AM    NEUTSPOLYS 63.80 01/14/2019 11:05 AM    LYMPHOCYTES 22.30 01/14/2019 11:05 AM    MONOCYTES 8.50 01/14/2019 11:05 AM    EOSINOPHILS 3.70 01/14/2019 11:05 AM    BASOPHILS 1.50 01/14/2019 11:05 AM        RADIOLOGY DATA:  Ct-abdomen-pelvis With    Result Date: 2/13/2019 2/13/2019 1:48 PM HISTORY/REASON FOR EXAM:  Recent diagnosis of endometrial carcinoma TECHNIQUE/EXAM DESCRIPTION: CT scan of the abdomen and pelvis with contrast. Contrast-enhanced helical scanning was obtained from the diaphragmatic domes through the pubic symphysis following the bolus administration of 100 mL of nonionic contrast without complication. Low dose optimization technique was utilized for this CT exam including automated exposure control and adjustment of the mA and/or kV according to patient size. COMPARISON: 11/29/2018. FINDINGS: There is left basilar atelectasis or scarring. Calcified bilateral breast implants are partially visualized. The enhanced liver, gallbladder, pancreas, spleen, adrenals and  kidneys are within normal limits. There is no free air or fluid. There are scattered colonic diverticula without discrete evidence of diverticulitis. The large and small bowel are otherwise unremarkable. The urinary bladder is unremarkable. The uterus and adnexal structures appear to be surgically absent. There is no free pelvic fluid. There is no pelvic, retroperitoneal or mesenteric adenopathy. There are no suspicious osseous lesions.     1.  No evidence of metastatic disease. 2.  Diverticulosis without evidence of diverticulitis. 3.  Status post hysterectomy.      IMPRESSION:    A 73 y.o. with stage Ib endometrioid adenocarcinoma with deep myometrial invasion and focal lymphovascular invasion.    RECOMMENDATIONS:   Reviewed pathology findings with patient.  Considering the adverse features previously described and her age, she is at risk for recurrence at the vaginal cuff of approximately 20%.  With vaginal brachytherapy that recurrence risk can be decreased to less than 5%.  Vaginal brachytherapy will involve delivering 2750 cGy in 5 fractions over 2-1/2 weeks to cuff using a cylinder and remote high dose rate afterloader.  The technical aspects benefits risks associated with vaginal cuff brachytherapy were reviewed with patient.  She understands would like to proceed.  She will return for planning on March 7 with treatment anticipated to get started on March 8 and completed by March 21.    One hour was spent face-to-face with patient in the office and more than half of that time was spent counseling patient or coordinating care as described above.    Thank you for the opportunity to participate in her care.  If any questions or comments, please do not hesitate in calling.    Masood KLEIN M.D.  Electronically signed by: Masood Perkins V, 3/5/2019 2:17 PM  [phone]

## 2019-03-06 ENCOUNTER — PATIENT OUTREACH (OUTPATIENT)
Dept: OTHER | Facility: MEDICAL CENTER | Age: 74
End: 2019-03-06

## 2019-03-06 NOTE — PROGRESS NOTES
Telephone call to pt. Introduced myself and the NN role and provided contact information. Reviewed barriers to care with pt, which she denied.  Also denied questions and concerns.  Encouraged to call with needs or if she has questions.

## 2019-03-07 ENCOUNTER — HOSPITAL ENCOUNTER (OUTPATIENT)
Dept: RADIATION ONCOLOGY | Facility: MEDICAL CENTER | Age: 74
End: 2019-03-07

## 2019-03-07 PROCEDURE — 77334 RADIATION TREATMENT AID(S): CPT | Mod: 26 | Performed by: RADIOLOGY

## 2019-03-07 PROCEDURE — 77334 RADIATION TREATMENT AID(S): CPT | Performed by: RADIOLOGY

## 2019-03-07 PROCEDURE — 77263 THER RADIOLOGY TX PLNG CPLX: CPT | Performed by: RADIOLOGY

## 2019-03-07 PROCEDURE — 77470 SPECIAL RADIATION TREATMENT: CPT | Performed by: RADIOLOGY

## 2019-03-07 PROCEDURE — 57156 INS VAG BRACHYTX DEVICE: CPT | Performed by: RADIOLOGY

## 2019-03-07 PROCEDURE — 77290 THER RAD SIMULAJ FIELD CPLX: CPT | Performed by: RADIOLOGY

## 2019-03-07 PROCEDURE — 77470 SPECIAL RADIATION TREATMENT: CPT | Mod: 26 | Performed by: RADIOLOGY

## 2019-03-07 PROCEDURE — 77290 THER RAD SIMULAJ FIELD CPLX: CPT | Mod: 26 | Performed by: RADIOLOGY

## 2019-03-07 PROCEDURE — 77332 RADIATION TREATMENT AID(S): CPT | Mod: XU | Performed by: RADIOLOGY

## 2019-03-08 ENCOUNTER — HOSPITAL ENCOUNTER (OUTPATIENT)
Dept: RADIATION ONCOLOGY | Facility: MEDICAL CENTER | Age: 74
End: 2019-03-08

## 2019-03-08 PROCEDURE — 57156 INS VAG BRACHYTX DEVICE: CPT | Performed by: RADIOLOGY

## 2019-03-08 PROCEDURE — 77295 3-D RADIOTHERAPY PLAN: CPT | Mod: 26 | Performed by: RADIOLOGY

## 2019-03-08 PROCEDURE — 77280 THER RAD SIMULAJ FIELD SMPL: CPT | Performed by: RADIOLOGY

## 2019-03-08 PROCEDURE — 77332 RADIATION TREATMENT AID(S): CPT | Performed by: RADIOLOGY

## 2019-03-08 PROCEDURE — 77295 3-D RADIOTHERAPY PLAN: CPT | Performed by: RADIOLOGY

## 2019-03-08 PROCEDURE — 77770 HDR RDNCL NTRSTL/ICAV BRCHTX: CPT | Mod: 26 | Performed by: RADIOLOGY

## 2019-03-08 PROCEDURE — 77770 HDR RDNCL NTRSTL/ICAV BRCHTX: CPT | Performed by: RADIOLOGY

## 2019-03-08 PROCEDURE — 77370 RADIATION PHYSICS CONSULT: CPT | Performed by: RADIOLOGY

## 2019-03-08 PROCEDURE — C1717 BRACHYTX, NON-STR,HDR IR-192: HCPCS | Performed by: RADIOLOGY

## 2019-03-12 ENCOUNTER — HOSPITAL ENCOUNTER (OUTPATIENT)
Dept: RADIATION ONCOLOGY | Facility: MEDICAL CENTER | Age: 74
End: 2019-03-12

## 2019-03-12 PROCEDURE — 77332 RADIATION TREATMENT AID(S): CPT | Performed by: RADIOLOGY

## 2019-03-12 PROCEDURE — 57156 INS VAG BRACHYTX DEVICE: CPT | Performed by: RADIOLOGY

## 2019-03-12 PROCEDURE — 77280 THER RAD SIMULAJ FIELD SMPL: CPT | Mod: 26 | Performed by: RADIOLOGY

## 2019-03-12 PROCEDURE — 77770 HDR RDNCL NTRSTL/ICAV BRCHTX: CPT | Performed by: RADIOLOGY

## 2019-03-12 PROCEDURE — 77770 HDR RDNCL NTRSTL/ICAV BRCHTX: CPT | Mod: 26 | Performed by: RADIOLOGY

## 2019-03-12 PROCEDURE — 77280 THER RAD SIMULAJ FIELD SMPL: CPT | Performed by: RADIOLOGY

## 2019-03-12 PROCEDURE — C1717 BRACHYTX, NON-STR,HDR IR-192: HCPCS | Performed by: RADIOLOGY

## 2019-03-14 ENCOUNTER — HOSPITAL ENCOUNTER (OUTPATIENT)
Dept: RADIATION ONCOLOGY | Facility: MEDICAL CENTER | Age: 74
End: 2019-03-14

## 2019-03-15 ENCOUNTER — HOSPITAL ENCOUNTER (OUTPATIENT)
Dept: RADIATION ONCOLOGY | Facility: MEDICAL CENTER | Age: 74
End: 2019-03-15

## 2019-03-15 PROCEDURE — 57156 INS VAG BRACHYTX DEVICE: CPT | Performed by: RADIOLOGY

## 2019-03-15 PROCEDURE — 77332 RADIATION TREATMENT AID(S): CPT | Performed by: RADIOLOGY

## 2019-03-15 PROCEDURE — C1717 BRACHYTX, NON-STR,HDR IR-192: HCPCS | Performed by: RADIOLOGY

## 2019-03-15 PROCEDURE — 77770 HDR RDNCL NTRSTL/ICAV BRCHTX: CPT | Mod: 26 | Performed by: RADIOLOGY

## 2019-03-15 PROCEDURE — 77336 RADIATION PHYSICS CONSULT: CPT | Mod: XU | Performed by: RADIOLOGY

## 2019-03-15 PROCEDURE — 77280 THER RAD SIMULAJ FIELD SMPL: CPT | Mod: 26 | Performed by: RADIOLOGY

## 2019-03-15 PROCEDURE — 77280 THER RAD SIMULAJ FIELD SMPL: CPT | Performed by: RADIOLOGY

## 2019-03-15 PROCEDURE — 77770 HDR RDNCL NTRSTL/ICAV BRCHTX: CPT | Performed by: RADIOLOGY

## 2019-03-19 ENCOUNTER — HOSPITAL ENCOUNTER (OUTPATIENT)
Dept: RADIATION ONCOLOGY | Facility: MEDICAL CENTER | Age: 74
End: 2019-03-19

## 2019-03-19 PROCEDURE — 77770 HDR RDNCL NTRSTL/ICAV BRCHTX: CPT | Mod: 26 | Performed by: RADIOLOGY

## 2019-03-19 PROCEDURE — 77280 THER RAD SIMULAJ FIELD SMPL: CPT | Mod: 26 | Performed by: RADIOLOGY

## 2019-03-19 PROCEDURE — 77770 HDR RDNCL NTRSTL/ICAV BRCHTX: CPT | Performed by: RADIOLOGY

## 2019-03-19 PROCEDURE — 57156 INS VAG BRACHYTX DEVICE: CPT | Performed by: RADIOLOGY

## 2019-03-19 PROCEDURE — 77332 RADIATION TREATMENT AID(S): CPT | Performed by: RADIOLOGY

## 2019-03-19 PROCEDURE — 77280 THER RAD SIMULAJ FIELD SMPL: CPT | Performed by: RADIOLOGY

## 2019-03-19 PROCEDURE — C1717 BRACHYTX, NON-STR,HDR IR-192: HCPCS | Performed by: RADIOLOGY

## 2019-03-21 ENCOUNTER — HOSPITAL ENCOUNTER (OUTPATIENT)
Dept: RADIATION ONCOLOGY | Facility: MEDICAL CENTER | Age: 74
End: 2019-03-21

## 2019-03-21 PROCEDURE — 77280 THER RAD SIMULAJ FIELD SMPL: CPT | Mod: 26 | Performed by: RADIOLOGY

## 2019-03-21 PROCEDURE — 57156 INS VAG BRACHYTX DEVICE: CPT | Performed by: RADIOLOGY

## 2019-03-21 PROCEDURE — 77332 RADIATION TREATMENT AID(S): CPT | Performed by: RADIOLOGY

## 2019-03-21 PROCEDURE — 77770 HDR RDNCL NTRSTL/ICAV BRCHTX: CPT | Performed by: RADIOLOGY

## 2019-03-21 PROCEDURE — 77770 HDR RDNCL NTRSTL/ICAV BRCHTX: CPT | Mod: 26 | Performed by: RADIOLOGY

## 2019-03-21 PROCEDURE — 77280 THER RAD SIMULAJ FIELD SMPL: CPT | Performed by: RADIOLOGY

## 2019-03-21 PROCEDURE — C1717 BRACHYTX, NON-STR,HDR IR-192: HCPCS | Performed by: RADIOLOGY

## 2019-05-16 ENCOUNTER — HOSPITAL ENCOUNTER (OUTPATIENT)
Dept: RADIATION ONCOLOGY | Facility: MEDICAL CENTER | Age: 74
End: 2019-05-31
Attending: RADIOLOGY
Payer: MEDICARE

## 2019-05-16 VITALS
SYSTOLIC BLOOD PRESSURE: 124 MMHG | HEART RATE: 87 BPM | WEIGHT: 133.3 LBS | BODY MASS INDEX: 24.38 KG/M2 | DIASTOLIC BLOOD PRESSURE: 76 MMHG | OXYGEN SATURATION: 98 % | TEMPERATURE: 98.8 F

## 2019-05-16 PROCEDURE — 99212 OFFICE O/P EST SF 10 MIN: CPT | Performed by: RADIOLOGY

## 2019-05-16 ASSESSMENT — PAIN SCALES - GENERAL: PAINLEVEL: NO PAIN

## 2019-05-16 NOTE — NON-PROVIDER
Patient was seen today in clinic with Dr. Perkins for follow up.  Vitals signs and weight were obtained and pain assessment was completed.  Allergies and medications were reviewed with the patient.  Review of systems completed.     Vitals/Pain:  Vitals:    05/16/19 1022   BP: 124/76   Pulse: 87   Temp: 37.1 °C (98.8 °F)   SpO2: 98%   Weight: 60.5 kg (133 lb 4.8 oz)   Pain Score: No pain        Allergies:   Penicillins    Current Medications:  Current Outpatient Prescriptions   Medication Sig Dispense Refill   • rosuvastatin (CRESTOR) 10 MG Tab Take 10 mg by mouth every evening.     • gabapentin (NEURONTIN) 300 MG Cap Take 300 mg by mouth every evening.     • Coenzyme Q10 (COQ-10) 100 MG Cap Take 1 Cap by mouth every day.     • MAGNESIUM-POTASSIUM PO Take 1 Tab by mouth every day. 300/40mg   With potasssium     • Calcium Acetate, Phos Binder, (CALCIUM ACETATE PO) Take 1 Tab by mouth every day.     • Cyanocobalamin (VITAMIN B-12 PO) Take 1 Tab by mouth every day.     • meloxicam (MOBIC) 15 MG tablet Take 15 mg by mouth every day.     • vitamin D, Ergocalciferol, (DRISDOL) 28085 UNITS Cap capsule Take 50,000 Units by mouth every 7 days.     • levothyroxine (LEVOXYL) 100 MCG TABS Take 100 mcg by mouth every day.     • sertraline (ZOLOFT) 50 MG TABS Take 50 mg by mouth every day.       No current facility-administered medications for this encounter.          PCP:  Yan Lomax Ass't

## 2019-05-16 NOTE — PROGRESS NOTES
RADIATION ONCOLOGY FOLLOW-UP    DATE OF SERVICE: 5/16/2019    IDENTIFICATION:   A 73 y.o. female with Malignant neoplasm of endometrium, Stg IB, C54.1 - Malignant neoplasm of endometrium    Cell Histology Category: Endometrioid; Type: Endometrioid adenocarcinoma, NOS; Grade I - Well differentiated       PRESCRIPTION:  Course ID Plan ID Rx Dose (cGy) Fraction Status   C1_VBT [id number]_[id number] 2,750 2 / 5 Completed Early   C1_VBT [id number] 1,650 3 / 3 Treatment Approved       TREATMENT SUMMARY:    Course: C1_VBT    Treatment Site Ref. ID Energy Dose/Fx (cGy) #Fx Dose Correction (cGy) Total Dose (cGy) Start Date End Date Elapsed Days   Cyl_05_30 [id number]_05  550 2 / 5 0 1,100 3/8/2019 3/12/2019 4   Cyl_05_30 Cylinder_05  550 3 / 3 0 1,650 3/15/2019 3/21/2019 6     .      HISTORY OF PRESENT ILLNESS:   Follow-up visit after completion of vaginal cuff brachii therapy for stage Ib endometrial cancer.  Overall feels well no vaginal, bladder, or rectal complaints.  In fact she states that her bowels have normalized were previously she had problems with constipation.    PROBLEM LIST:  Patient Active Problem List   Diagnosis   • OA (osteoarthritis) of knee   • Left knee pain       CURRENT MEDICATIONS:  Current Outpatient Prescriptions   Medication Sig Dispense Refill   • rosuvastatin (CRESTOR) 10 MG Tab Take 10 mg by mouth every evening.     • gabapentin (NEURONTIN) 300 MG Cap Take 300 mg by mouth every evening.     • Coenzyme Q10 (COQ-10) 100 MG Cap Take 1 Cap by mouth every day.     • MAGNESIUM-POTASSIUM PO Take 1 Tab by mouth every day. 300/40mg   With potasssium     • Calcium Acetate, Phos Binder, (CALCIUM ACETATE PO) Take 1 Tab by mouth every day.     • Cyanocobalamin (VITAMIN B-12 PO) Take 1 Tab by mouth every day.     • meloxicam (MOBIC) 15 MG tablet Take 15 mg by mouth every day.     • vitamin D, Ergocalciferol, (DRISDOL) 09766 UNITS Cap capsule Take 50,000 Units by mouth every 7 days.     • levothyroxine (LEVOXYL) 100 MCG  TABS Take 100 mcg by mouth every day.     • sertraline (ZOLOFT) 50 MG TABS Take 50 mg by mouth every day.       No current facility-administered medications for this encounter.        ALLERGIES:  Penicillins    REVIEW OF SYSTEMS:  A review of systems for today's date of service was reviewed and uploaded into the electronic medical record.    PHYSICAL EXAM:   /76   Pulse 87   Temp 37.1 °C (98.8 °F)   Wt 60.5 kg (133 lb 4.8 oz)   SpO2 98%   BMI 24.38 kg/m²   GENERAL: Alert, oriented, well-developed, well-nourished, no acute distress.  HEENT:  Pupils are equal, round, and reactive to light.  Extraocular muscles   are intact. Sclerae nonicteric.  Conjunctivae pink.  Oral cavity, tongue   protrudes midline.   NECK:  Supple without evidence of thyromegaly.  NODES:  No peripheral adenopathy of the neck, supraclavicular fossa or axillae   bilaterally.  ABDOMEN:  Soft. No evidence of hepatosplenomegaly.  Positive bowel sounds.  PELVIC: Normal introitus vaginal mucosa pink vaginal cuff intact no visible or palpable abnormality.  EXTREMITIES:  Without Edema.  NEUROLOGIC:  Cranial nerves II through XII were intact.  Strength is 5/5 in   lower extremities bilaterally.  DTRs were symmetrical.  There was no focal   sensory deficit appreciated.    LABORATORY DATA:   Lab Results   Component Value Date/Time    SODIUM 135 01/14/2019 11:05 AM    POTASSIUM 3.9 01/14/2019 11:05 AM    CHLORIDE 102 01/14/2019 11:05 AM    CO2 27 01/14/2019 11:05 AM    GLUCOSE 108 (H) 01/14/2019 11:05 AM    BUN 18 01/14/2019 11:05 AM    CREATININE 0.71 01/14/2019 11:05 AM     Lab Results   Component Value Date/Time    ALKPHOSPHAT 95 01/14/2019 11:05 AM    ASTSGOT 19 01/14/2019 11:05 AM    ALTSGPT 18 01/14/2019 11:05 AM    TBILIRUBIN 0.6 01/14/2019 11:05 AM      Lab Results   Component Value Date/Time    WBC 6.0 01/14/2019 11:05 AM    RBC 3.73 (L) 01/14/2019 11:05 AM    HEMOGLOBIN 11.7 (L) 01/14/2019 11:05 AM    HEMATOCRIT 36.3 (L) 01/14/2019 11:05  AM    MCV 97.3 01/14/2019 11:05 AM    MCH 31.4 01/14/2019 11:05 AM    MCHC 32.2 (L) 01/14/2019 11:05 AM    MPV 9.4 01/14/2019 11:05 AM    NEUTSPOLYS 63.80 01/14/2019 11:05 AM    LYMPHOCYTES 22.30 01/14/2019 11:05 AM    MONOCYTES 8.50 01/14/2019 11:05 AM    EOSINOPHILS 3.70 01/14/2019 11:05 AM    BASOPHILS 1.50 01/14/2019 11:05 AM        RADIOLOGY DATA:  No results found.    IMPRESSION:    A 73 y.o. with 1B endometrial cancer status post vaginal cuff brachytherapy.  Clinically without evidence of disease.    RECOMMENDATIONS:   Reviewed physical exam findings with patient.  Reassured her.  At this point I will turn over follow-up care to Dr. Paez and will see her back on an as-needed basis.    Thank you for the opportunity to participate in her care.  If any questions or comments, please do not hesitate in calling.    Masood KLEIN M.D.  Electronically signed by: Masood Perkins V, 5/16/2019 11:56 AM  [phone]

## 2019-05-17 ENCOUNTER — PATIENT OUTREACH (OUTPATIENT)
Dept: OTHER | Facility: MEDICAL CENTER | Age: 74
End: 2019-05-17

## 2019-05-17 NOTE — PROGRESS NOTES
Telephone call to review endometrial cancer treatment summary and survivorship care plan. Late or long term effects noted at this visit: None.  Pt stated she no longer has constipation.Confirmed pt is using vaginal dilator with a water based lubricant, per physician instructions. Reviewed [facility]/physician ollow up guidelines reviewed with patient. Patient referred to treating physician for clarification / questions regarding the Survivorship Care Plan.

## 2019-05-21 ENCOUNTER — OFFICE VISIT (OUTPATIENT)
Dept: URGENT CARE | Facility: MEDICAL CENTER | Age: 74
End: 2019-05-21
Payer: MEDICARE

## 2019-05-21 VITALS
TEMPERATURE: 99 F | BODY MASS INDEX: 24.29 KG/M2 | OXYGEN SATURATION: 97 % | SYSTOLIC BLOOD PRESSURE: 120 MMHG | HEART RATE: 78 BPM | WEIGHT: 132 LBS | DIASTOLIC BLOOD PRESSURE: 62 MMHG | HEIGHT: 62 IN

## 2019-05-21 DIAGNOSIS — N39.0 URINARY TRACT INFECTION WITH HEMATURIA, SITE UNSPECIFIED: ICD-10-CM

## 2019-05-21 DIAGNOSIS — R31.9 URINARY TRACT INFECTION WITH HEMATURIA, SITE UNSPECIFIED: ICD-10-CM

## 2019-05-21 DIAGNOSIS — R30.0 DYSURIA: ICD-10-CM

## 2019-05-21 LAB
APPEARANCE UR: NORMAL
BILIRUB UR STRIP-MCNC: NEGATIVE MG/DL
COLOR UR AUTO: YELLOW
GLUCOSE UR STRIP.AUTO-MCNC: NEGATIVE MG/DL
KETONES UR STRIP.AUTO-MCNC: NEGATIVE MG/DL
LEUKOCYTE ESTERASE UR QL STRIP.AUTO: NORMAL
NITRITE UR QL STRIP.AUTO: NEGATIVE
PH UR STRIP.AUTO: 7 [PH] (ref 5–8)
PROT UR QL STRIP: 30 MG/DL
RBC UR QL AUTO: NORMAL
SP GR UR STRIP.AUTO: 1.01
UROBILINOGEN UR STRIP-MCNC: 0.2 MG/DL

## 2019-05-21 PROCEDURE — 99214 OFFICE O/P EST MOD 30 MIN: CPT | Performed by: NURSE PRACTITIONER

## 2019-05-21 PROCEDURE — 81002 URINALYSIS NONAUTO W/O SCOPE: CPT | Performed by: NURSE PRACTITIONER

## 2019-05-21 RX ORDER — SULFAMETHOXAZOLE AND TRIMETHOPRIM 800; 160 MG/1; MG/1
1 TABLET ORAL EVERY 12 HOURS
Qty: 10 TAB | Refills: 0 | Status: SHIPPED | OUTPATIENT
Start: 2019-05-21 | End: 2019-05-26

## 2019-05-21 ASSESSMENT — ENCOUNTER SYMPTOMS
VOMITING: 0
WHEEZING: 0
RESPIRATORY NEGATIVE: 1
SWEATS: 0
NECK PAIN: 0
NAUSEA: 0
DIARRHEA: 0
HEADACHES: 0
NEUROLOGICAL NEGATIVE: 1
FEVER: 0
ABDOMINAL PAIN: 0
EYES NEGATIVE: 1
SHORTNESS OF BREATH: 0
DIZZINESS: 0
FLANK PAIN: 0
CONSTIPATION: 0
CHILLS: 0
CARDIOVASCULAR NEGATIVE: 1
BACK PAIN: 0
MYALGIAS: 0

## 2019-05-21 NOTE — PROGRESS NOTES
Subjective:   Alice Whitlock is a 73 y.o. female who presents for UTI (x5days, burning sensation, frequency)        Dysuria    This is a new problem. The current episode started in the past 7 days (Started 5 days ago). The problem occurs intermittently. The problem has been waxing and waning. The quality of the pain is described as burning and aching. The pain is moderate. There has been no fever. She is not sexually active. There is no history of pyelonephritis. Associated symptoms include frequency and urgency. Pertinent negatives include no chills, discharge, flank pain, hematuria, hesitancy, nausea, possible pregnancy, sweats or vomiting. She has tried increased fluids for the symptoms. The treatment provided mild relief. There is no history of recurrent UTIs.        Review of Systems   Constitutional: Negative for chills and fever.   Eyes: Negative.    Respiratory: Negative.  Negative for shortness of breath and wheezing.    Cardiovascular: Negative.  Negative for chest pain.   Gastrointestinal: Negative for abdominal pain, constipation, diarrhea, nausea and vomiting.   Genitourinary: Positive for dysuria, frequency and urgency. Negative for flank pain, hematuria and hesitancy.   Musculoskeletal: Negative for back pain, myalgias and neck pain.   Skin: Negative.    Neurological: Negative.  Negative for dizziness and headaches.     PMH:  has a past medical history of Arthritis; Cancer ([facility]) (2015); Heart burn; LBBB (left bundle branch block); Pain; Psychiatric problem; Unspecified disorder of thyroid; and Unspecified urinary incontinence.  MEDS:   Current Outpatient Prescriptions:   •  sulfamethoxazole-trimethoprim (BACTRIM DS) 800-160 MG tablet, Take 1 Tab by mouth every 12 hours for 5 days., Disp: 10 Tab, Rfl: 0  •  rosuvastatin (CRESTOR) 10 MG Tab, Take 10 mg by mouth every evening., Disp: , Rfl:   •  gabapentin (NEURONTIN) 300 MG Cap, Take 300 mg by mouth every evening., Disp: , Rfl:   •  Coenzyme Q10  "(COQ-10) 100 MG Cap, Take 1 Cap by mouth every day., Disp: , Rfl:   •  MAGNESIUM-POTASSIUM PO, Take 1 Tab by mouth every day. 300/40mg  With potasssium, Disp: , Rfl:   •  Calcium Acetate, Phos Binder, (CALCIUM ACETATE PO), Take 1 Tab by mouth every day., Disp: , Rfl:   •  Cyanocobalamin (VITAMIN B-12 PO), Take 1 Tab by mouth every day., Disp: , Rfl:   •  meloxicam (MOBIC) 15 MG tablet, Take 15 mg by mouth every day., Disp: , Rfl:   •  vitamin D, Ergocalciferol, (DRISDOL) 35764 UNITS Cap capsule, Take 50,000 Units by mouth every 7 days., Disp: , Rfl:   •  levothyroxine (LEVOXYL) 100 MCG TABS, Take 100 mcg by mouth every day., Disp: , Rfl:   •  sertraline (ZOLOFT) 50 MG TABS, Take 50 mg by mouth every day., Disp: , Rfl:   ALLERGIES:   Allergies   Allergen Reactions   • Penicillins      Reaction unknown \"fever\"     SURGHX:   Past Surgical History:   Procedure Laterality Date   • HYSTERECTOMY ROBOTIC XI  1/17/2019    Procedure: HYSTERECTOMY ROBOTIC XI;  Surgeon: Shahid Paez M.D.;  Location: [facility];  Service: Gynecology   • SALPINGECTOMY Bilateral 1/17/2019    Procedure: SALPINGECTOMY;  Surgeon: Shahid Paez M.D.;  Location: [facility];  Service: Gynecology   • OOPHORECTOMY Bilateral 1/17/2019    Procedure: OOPHORECTOMY;  Surgeon: Shahid Paez M.D.;  Location: [facility];  Service: Gynecology   • NODE BIOPSY SENTINEL  1/17/2019    Procedure: NODE BIOPSY SENTINEL;  Surgeon: Shahid Paez M.D.;  Location: [facility];  Service: Gynecology   • KNEE ARTHROPLASTY TOTAL Left 10/11/2016    Procedure: KNEE ARTHROPLASTY TOTAL;  Surgeon: Beverly Chauhan M.D.;  Location: [facility];  Service:    • KNEE ARTHROPLASTY TOTAL  11/6/2012    Performed by Beverly Chauhan M.D. at [facility]   • KNEE ARTHROSCOPY  1/18/2011    Performed by MARILEE NAGEL at [facility]   • MEDIAL MENISCECTOMY  1/18/2011    Performed by MARILEE NAGEL at " "[facility]ERY [facility]   • MENISCECTOMY  1/18/2011    Performed by MARILEE NAGEL at SURGERY [facility]   • SYNOVECTOMY  1/18/2011    Performed by MARILEE NAGEL at [facility]   • BLADDER SUSPENSION  2000   • THYROIDECTOMY TOTAL  1990   • MAMMOPLASTY AUGMENTATION  1981   • PB ENLARGE BREAST WITH IMPLANT  1980   • OTHER SURGICAL PROCEDURE  1979    excision thyroid nodules   • LAPAROTOMY  1972    endometriosis   • TONSILLECTOMY  1957     SOCHX:  reports that she has never smoked. She has never used smokeless tobacco. She reports that she drinks alcohol. She reports that she does not use drugs.  FH: Family history was reviewed, no pertinent findings to report     Objective:   /62   Pulse 78   Temp 37.2 °C (99 °F) (Temporal)   Ht 1.575 m (5' 2\")   Wt 59.9 kg (132 lb)   SpO2 97%   BMI 24.14 kg/m²   Physical Exam   Constitutional: She is oriented to person, place, and time. She appears well-developed and well-nourished. No distress.   HENT:   Right Ear: Hearing normal.   Left Ear: Hearing normal.   Mouth/Throat: Oropharynx is clear and moist and mucous membranes are normal.   Eyes: Pupils are equal, round, and reactive to light. Conjunctivae are normal.   Cardiovascular: Normal rate, regular rhythm and normal heart sounds.    No murmur heard.  Pulmonary/Chest: Effort normal and breath sounds normal. No respiratory distress.   Abdominal: Soft. Normal appearance and bowel sounds are normal. She exhibits no distension. There is no hepatosplenomegaly. There is tenderness in the suprapubic area. There is no rigidity, no rebound, no guarding and no CVA tenderness.   Mild suprapubic tenderness   Neurological: She is alert and oriented to person, place, and time.   Skin: Skin is warm and dry. Capillary refill takes less than 2 seconds. She is not diaphoretic.   Psychiatric: She has a normal mood and affect. Her behavior is normal. Judgment and thought content normal.   Vitals reviewed.      "   Assessment/Plan:   Assessment    1. Dysuria  - POCT Urinalysis    2. Urinary tract infection with hematuria, site unspecified  - sulfamethoxazole-trimethoprim (BACTRIM DS) 800-160 MG tablet; Take 1 Tab by mouth every 12 hours for 5 days.  Dispense: 10 Tab; Refill: 0    UA with blood and leuks.  Patient encouraged to increase clear liquid intake    Differential diagnosis, natural history, supportive care, and indications for immediate follow-up discussed.

## 2019-06-27 ENCOUNTER — OFFICE VISIT (OUTPATIENT)
Dept: URGENT CARE | Facility: MEDICAL CENTER | Age: 74
End: 2019-06-27
Payer: MEDICARE

## 2019-06-27 VITALS
WEIGHT: 133.2 LBS | DIASTOLIC BLOOD PRESSURE: 68 MMHG | HEIGHT: 62 IN | HEART RATE: 80 BPM | TEMPERATURE: 98.3 F | SYSTOLIC BLOOD PRESSURE: 112 MMHG | BODY MASS INDEX: 24.51 KG/M2 | OXYGEN SATURATION: 99 %

## 2019-06-27 DIAGNOSIS — H61.22 IMPACTED CERUMEN OF LEFT EAR: ICD-10-CM

## 2019-06-27 PROCEDURE — 99214 OFFICE O/P EST MOD 30 MIN: CPT | Performed by: NURSE PRACTITIONER

## 2019-06-27 ASSESSMENT — ENCOUNTER SYMPTOMS
CONSTIPATION: 0
STRIDOR: 0
CHILLS: 0
MUSCULOSKELETAL NEGATIVE: 1
DOUBLE VISION: 0
DIARRHEA: 0
NAUSEA: 0
HEADACHES: 0
SORE THROAT: 0
WHEEZING: 0
BLURRED VISION: 0
COUGH: 0
PALPITATIONS: 0
VOMITING: 0
RHINORRHEA: 0
DIZZINESS: 0
FEVER: 0
ABDOMINAL PAIN: 0

## 2019-06-27 NOTE — PROGRESS NOTES
Subjective:   Alice Whitlock is a 73 y.o. female who presents for Ear Fullness (off and on X2 months, worse pas few weeks, left ear)        Otalgia    There is pain in the left ear. This is a new problem. The current episode started 1 to 4 weeks ago. The problem has been waxing and waning (Worsening over the past several days). There has been no fever. The pain is mild. Associated symptoms include hearing loss. Pertinent negatives include no abdominal pain, coughing, diarrhea, ear discharge, headaches, rash, rhinorrhea, sore throat or vomiting. She has tried ear drops for the symptoms. The treatment provided no relief. There is no history of a chronic ear infection or a tympanostomy tube.      States tinnitus is chronic for her.    Review of Systems   Constitutional: Negative for chills and fever.   HENT: Positive for ear pain, hearing loss and tinnitus. Negative for congestion, ear discharge, rhinorrhea and sore throat.    Eyes: Negative for blurred vision and double vision.   Respiratory: Negative for cough, wheezing and stridor.    Cardiovascular: Negative for chest pain and palpitations.   Gastrointestinal: Negative for abdominal pain, constipation, diarrhea, nausea and vomiting.   Musculoskeletal: Negative.    Skin: Negative.  Negative for itching and rash.   Neurological: Negative for dizziness and headaches.   All other systems reviewed and are negative.    PMH:  has a past medical history of Arthritis; Cancer ([facility]) (2015); Heart burn; LBBB (left bundle branch block); Pain; Psychiatric problem; Unspecified disorder of thyroid; and Unspecified urinary incontinence.  MEDS:   Current Outpatient Prescriptions:   •  rosuvastatin (CRESTOR) 10 MG Tab, Take 10 mg by mouth every evening., Disp: , Rfl:   •  gabapentin (NEURONTIN) 300 MG Cap, Take 300 mg by mouth every evening., Disp: , Rfl:   •  Coenzyme Q10 (COQ-10) 100 MG Cap, Take 1 Cap by mouth every day., Disp: , Rfl:   •  MAGNESIUM-POTASSIUM PO, Take 1 Tab by  "mouth every day. 300/40mg  With potasssium, Disp: , Rfl:   •  Calcium Acetate, Phos Binder, (CALCIUM ACETATE PO), Take 1 Tab by mouth every day., Disp: , Rfl:   •  Cyanocobalamin (VITAMIN B-12 PO), Take 1 Tab by mouth every day., Disp: , Rfl:   •  meloxicam (MOBIC) 15 MG tablet, Take 15 mg by mouth every day., Disp: , Rfl:   •  vitamin D, Ergocalciferol, (DRISDOL) 85072 UNITS Cap capsule, Take 50,000 Units by mouth every 7 days., Disp: , Rfl:   •  levothyroxine (LEVOXYL) 100 MCG TABS, Take 100 mcg by mouth every day., Disp: , Rfl:   •  sertraline (ZOLOFT) 50 MG TABS, Take 50 mg by mouth every day., Disp: , Rfl:   ALLERGIES:   Allergies   Allergen Reactions   • Penicillins      Reaction unknown \"fever\"     SURGHX:   Past Surgical History:   Procedure Laterality Date   • HYSTERECTOMY ROBOTIC XI  1/17/2019    Procedure: HYSTERECTOMY ROBOTIC XI;  Surgeon: Shahid Paez M.D.;  Location: [facility];  Service: Gynecology   • SALPINGECTOMY Bilateral 1/17/2019    Procedure: SALPINGECTOMY;  Surgeon: Shahid Paez M.D.;  Location: [facility];  Service: Gynecology   • OOPHORECTOMY Bilateral 1/17/2019    Procedure: OOPHORECTOMY;  Surgeon: Shahid Paez M.D.;  Location: [facility];  Service: Gynecology   • NODE BIOPSY SENTINEL  1/17/2019    Procedure: NODE BIOPSY SENTINEL;  Surgeon: Shahid Paez M.D.;  Location: [facility];  Service: Gynecology   • KNEE ARTHROPLASTY TOTAL Left 10/11/2016    Procedure: KNEE ARTHROPLASTY TOTAL;  Surgeon: Beverly Chauhan M.D.;  Location: [facility];  Service:    • KNEE ARTHROPLASTY TOTAL  11/6/2012    Performed by Beverly Chauhan M.D. at [facility]   • KNEE ARTHROSCOPY  1/18/2011    Performed by MARILEE NAGEL at [facility]   • MEDIAL MENISCECTOMY  1/18/2011    Performed by MARILEE NAGEL at [facility]   • MENISCECTOMY  1/18/2011    Performed by MARILEE NAGEL at [facility]S " "  • SYNOVECTOMY  1/18/2011    Performed by MARILEE NAGEL at [facility]   • BLADDER SUSPENSION  2000   • THYROIDECTOMY TOTAL  1990   • MAMMOPLASTY AUGMENTATION  1981   • PB ENLARGE BREAST WITH IMPLANT  1980   • OTHER SURGICAL PROCEDURE  1979    excision thyroid nodules   • LAPAROTOMY  1972    endometriosis   • TONSILLECTOMY  1957     SOCHX:  reports that she has never smoked. She has never used smokeless tobacco. She reports that she drinks alcohol. She reports that she does not use drugs.  FH: Family history was reviewed, no pertinent findings to report     Objective:   /68   Pulse 80   Temp 36.8 °C (98.3 °F)   Ht 1.575 m (5' 2\")   Wt 60.4 kg (133 lb 3.2 oz)   SpO2 99%   BMI 24.36 kg/m²   Physical Exam   Constitutional: She is oriented to person, place, and time. She appears well-developed and well-nourished. No distress.   HENT:   Head: Normocephalic.   Right Ear: Hearing, tympanic membrane, external ear and ear canal normal. Tympanic membrane is not erythematous. No middle ear effusion.   Left Ear: Hearing, tympanic membrane, external ear and ear canal normal. Tympanic membrane is not erythematous.  No middle ear effusion.   Nose: No rhinorrhea. Right sinus exhibits no maxillary sinus tenderness and no frontal sinus tenderness. Left sinus exhibits no maxillary sinus tenderness and no frontal sinus tenderness.   Mouth/Throat: Oropharynx is clear and moist and mucous membranes are normal. No posterior oropharyngeal erythema.   Eyes: Pupils are equal, round, and reactive to light. Conjunctivae, EOM and lids are normal.   Neck: Normal range of motion. No thyromegaly present.   Cardiovascular: Normal rate, regular rhythm and normal heart sounds.    Pulmonary/Chest: Effort normal and breath sounds normal. No respiratory distress. She has no wheezes.   Lymphadenopathy:        Head (right side): No submandibular and no tonsillar adenopathy present.        Head (left side): No submandibular and " no tonsillar adenopathy present.   Neurological: She is alert and oriented to person, place, and time.   Skin: Skin is warm and dry. She is not diaphoretic.   Psychiatric: She has a normal mood and affect. Her speech is normal and behavior is normal. Judgment and thought content normal.   Vitals reviewed.        Assessment/Plan:   Assessment    1. Impacted cerumen of left ear    Procedure: Cerumen Removal by MA  Risks and benefits of procedure discussed  Cerumen removed with curette and lavage after softening agent instilled  Patient tolerated well  Post procedure exam with clear canal and normal TM    Differential diagnosis, natural history, supportive care, and indications for immediate follow-up discussed.

## 2019-09-21 ENCOUNTER — HOSPITAL ENCOUNTER (OUTPATIENT)
Dept: RADIOLOGY | Facility: MEDICAL CENTER | Age: 74
End: 2019-09-21
Attending: NURSE PRACTITIONER
Payer: MEDICARE

## 2019-09-21 DIAGNOSIS — C54.1 MALIGNANT NEOPLASM OF ENDOMETRIUM (HCC): ICD-10-CM

## 2019-09-21 PROCEDURE — 71260 CT THORAX DX C+: CPT

## 2019-09-21 PROCEDURE — 700117 HCHG RX CONTRAST REV CODE 255: Performed by: NURSE PRACTITIONER

## 2019-09-21 RX ADMIN — IOHEXOL 25 ML: 240 INJECTION, SOLUTION INTRATHECAL; INTRAVASCULAR; INTRAVENOUS; ORAL at 11:21

## 2019-09-21 RX ADMIN — IOHEXOL 100 ML: 350 INJECTION, SOLUTION INTRAVENOUS at 11:21

## 2019-12-19 ENCOUNTER — HOSPITAL ENCOUNTER (OUTPATIENT)
Dept: RADIOLOGY | Facility: MEDICAL CENTER | Age: 74
End: 2019-12-19
Attending: NURSE PRACTITIONER
Payer: MEDICARE

## 2019-12-19 ENCOUNTER — HOSPITAL ENCOUNTER (OUTPATIENT)
Dept: RADIOLOGY | Facility: MEDICAL CENTER | Age: 74
End: 2019-12-19
Attending: SPECIALIST
Payer: MEDICARE

## 2019-12-19 DIAGNOSIS — Z12.31 VISIT FOR SCREENING MAMMOGRAM: ICD-10-CM

## 2019-12-19 DIAGNOSIS — M89.9 DISORDER OF BONE: ICD-10-CM

## 2019-12-19 PROCEDURE — 77063 BREAST TOMOSYNTHESIS BI: CPT

## 2019-12-19 PROCEDURE — 77080 DXA BONE DENSITY AXIAL: CPT

## 2020-03-10 ENCOUNTER — HOSPITAL ENCOUNTER (OUTPATIENT)
Dept: RADIOLOGY | Facility: MEDICAL CENTER | Age: 75
End: 2020-03-10
Attending: NURSE PRACTITIONER
Payer: MEDICARE

## 2020-03-10 ENCOUNTER — HOSPITAL ENCOUNTER (OUTPATIENT)
Dept: LAB | Facility: MEDICAL CENTER | Age: 75
End: 2020-03-10
Attending: NURSE PRACTITIONER
Payer: MEDICARE

## 2020-03-10 DIAGNOSIS — C54.1 ENDOMETRIAL NEOPLASM MALIGNANT (HCC): ICD-10-CM

## 2020-03-10 LAB
ANION GAP SERPL CALC-SCNC: 11 MMOL/L (ref 7–16)
BUN SERPL-MCNC: 13 MG/DL (ref 8–22)
CALCIUM SERPL-MCNC: 10.3 MG/DL (ref 8.4–10.2)
CHLORIDE SERPL-SCNC: 101 MMOL/L (ref 96–112)
CO2 SERPL-SCNC: 26 MMOL/L (ref 20–33)
CREAT SERPL-MCNC: 0.72 MG/DL (ref 0.5–1.4)
GLUCOSE SERPL-MCNC: 87 MG/DL (ref 65–99)
POTASSIUM SERPL-SCNC: 4.3 MMOL/L (ref 3.6–5.5)
SODIUM SERPL-SCNC: 138 MMOL/L (ref 135–145)

## 2020-03-10 PROCEDURE — 71260 CT THORAX DX C+: CPT

## 2020-03-10 PROCEDURE — 700117 HCHG RX CONTRAST REV CODE 255: Performed by: NURSE PRACTITIONER

## 2020-03-10 PROCEDURE — 80048 BASIC METABOLIC PNL TOTAL CA: CPT

## 2020-03-10 PROCEDURE — 36415 COLL VENOUS BLD VENIPUNCTURE: CPT

## 2020-03-10 RX ADMIN — IOHEXOL 100 ML: 350 INJECTION, SOLUTION INTRAVENOUS at 09:25

## 2020-03-10 RX ADMIN — IOHEXOL 25 ML: 240 INJECTION, SOLUTION INTRATHECAL; INTRAVASCULAR; INTRAVENOUS; ORAL at 09:25

## 2020-07-29 ENCOUNTER — HOSPITAL ENCOUNTER (OUTPATIENT)
Dept: LAB | Facility: MEDICAL CENTER | Age: 75
End: 2020-07-29
Attending: OBSTETRICS & GYNECOLOGY
Payer: MEDICARE

## 2020-07-29 LAB
ALBUMIN SERPL BCP-MCNC: 4.4 G/DL (ref 3.2–4.9)
ALBUMIN/GLOB SERPL: 1.8 G/DL
ALP SERPL-CCNC: 73 U/L (ref 30–99)
ALT SERPL-CCNC: 14 U/L (ref 2–50)
ANION GAP SERPL CALC-SCNC: 9 MMOL/L (ref 7–16)
AST SERPL-CCNC: 19 U/L (ref 12–45)
BASOPHILS # BLD AUTO: 2.2 % (ref 0–1.8)
BASOPHILS # BLD: 0.1 K/UL (ref 0–0.12)
BILIRUB SERPL-MCNC: 0.6 MG/DL (ref 0.1–1.5)
BUN SERPL-MCNC: 16 MG/DL (ref 8–22)
CALCIUM SERPL-MCNC: 10.2 MG/DL (ref 8.5–10.5)
CHLORIDE SERPL-SCNC: 98 MMOL/L (ref 96–112)
CO2 SERPL-SCNC: 26 MMOL/L (ref 20–33)
CREAT SERPL-MCNC: 0.73 MG/DL (ref 0.5–1.4)
EOSINOPHIL # BLD AUTO: 0.11 K/UL (ref 0–0.51)
EOSINOPHIL NFR BLD: 2.4 % (ref 0–6.9)
ERYTHROCYTE [DISTWIDTH] IN BLOOD BY AUTOMATED COUNT: 41.7 FL (ref 35.9–50)
GLOBULIN SER CALC-MCNC: 2.5 G/DL (ref 1.9–3.5)
GLUCOSE SERPL-MCNC: 82 MG/DL (ref 65–99)
HCT VFR BLD AUTO: 39.1 % (ref 37–47)
HGB BLD-MCNC: 12.8 G/DL (ref 12–16)
IMM GRANULOCYTES # BLD AUTO: 0.01 K/UL (ref 0–0.11)
IMM GRANULOCYTES NFR BLD AUTO: 0.2 % (ref 0–0.9)
LYMPHOCYTES # BLD AUTO: 1.26 K/UL (ref 1–4.8)
LYMPHOCYTES NFR BLD: 27.5 % (ref 22–41)
MCH RBC QN AUTO: 32 PG (ref 27–33)
MCHC RBC AUTO-ENTMCNC: 32.7 G/DL (ref 33.6–35)
MCV RBC AUTO: 97.8 FL (ref 81.4–97.8)
MONOCYTES # BLD AUTO: 0.5 K/UL (ref 0–0.85)
MONOCYTES NFR BLD AUTO: 10.9 % (ref 0–13.4)
NEUTROPHILS # BLD AUTO: 2.61 K/UL (ref 2–7.15)
NEUTROPHILS NFR BLD: 56.8 % (ref 44–72)
NRBC # BLD AUTO: 0 K/UL
NRBC BLD-RTO: 0 /100 WBC
PLATELET # BLD AUTO: 230 K/UL (ref 164–446)
PMV BLD AUTO: 9.6 FL (ref 9–12.9)
POTASSIUM SERPL-SCNC: 4.3 MMOL/L (ref 3.6–5.5)
PROT SERPL-MCNC: 6.9 G/DL (ref 6–8.2)
RBC # BLD AUTO: 4 M/UL (ref 4.2–5.4)
SODIUM SERPL-SCNC: 133 MMOL/L (ref 135–145)
WBC # BLD AUTO: 4.6 K/UL (ref 4.8–10.8)

## 2020-07-29 PROCEDURE — 36415 COLL VENOUS BLD VENIPUNCTURE: CPT

## 2020-07-29 PROCEDURE — 86304 IMMUNOASSAY TUMOR CA 125: CPT

## 2020-07-29 PROCEDURE — 85025 COMPLETE CBC W/AUTO DIFF WBC: CPT

## 2020-07-29 PROCEDURE — 80053 COMPREHEN METABOLIC PANEL: CPT

## 2020-07-30 LAB — CANCER AG125 SERPL-ACNC: 7.1 U/ML (ref 0–35)

## 2020-09-16 ENCOUNTER — HOSPITAL ENCOUNTER (OUTPATIENT)
Dept: LAB | Facility: MEDICAL CENTER | Age: 75
End: 2020-09-16
Attending: OBSTETRICS & GYNECOLOGY
Payer: MEDICARE

## 2020-09-16 LAB
ALBUMIN SERPL BCP-MCNC: 4.6 G/DL (ref 3.2–4.9)
ALBUMIN/GLOB SERPL: 1.8 G/DL
ALP SERPL-CCNC: 85 U/L (ref 30–99)
ALT SERPL-CCNC: 15 U/L (ref 2–50)
ANION GAP SERPL CALC-SCNC: 12 MMOL/L (ref 7–16)
AST SERPL-CCNC: 20 U/L (ref 12–45)
BASOPHILS # BLD AUTO: 2 % (ref 0–1.8)
BASOPHILS # BLD: 0.08 K/UL (ref 0–0.12)
BILIRUB SERPL-MCNC: 0.7 MG/DL (ref 0.1–1.5)
BUN SERPL-MCNC: 13 MG/DL (ref 8–22)
CALCIUM SERPL-MCNC: 10.1 MG/DL (ref 8.5–10.5)
CANCER AG125 SERPL-ACNC: 7.7 U/ML (ref 0–35)
CHLORIDE SERPL-SCNC: 99 MMOL/L (ref 96–112)
CO2 SERPL-SCNC: 24 MMOL/L (ref 20–33)
CREAT SERPL-MCNC: 0.61 MG/DL (ref 0.5–1.4)
EOSINOPHIL # BLD AUTO: 0.12 K/UL (ref 0–0.51)
EOSINOPHIL NFR BLD: 3 % (ref 0–6.9)
ERYTHROCYTE [DISTWIDTH] IN BLOOD BY AUTOMATED COUNT: 41.3 FL (ref 35.9–50)
GLOBULIN SER CALC-MCNC: 2.5 G/DL (ref 1.9–3.5)
GLUCOSE SERPL-MCNC: 85 MG/DL (ref 65–99)
HCT VFR BLD AUTO: 38.7 % (ref 37–47)
HGB BLD-MCNC: 12.8 G/DL (ref 12–16)
IMM GRANULOCYTES # BLD AUTO: 0 K/UL (ref 0–0.11)
IMM GRANULOCYTES NFR BLD AUTO: 0 % (ref 0–0.9)
LYMPHOCYTES # BLD AUTO: 1.19 K/UL (ref 1–4.8)
LYMPHOCYTES NFR BLD: 30.1 % (ref 22–41)
MCH RBC QN AUTO: 31.6 PG (ref 27–33)
MCHC RBC AUTO-ENTMCNC: 33.1 G/DL (ref 33.6–35)
MCV RBC AUTO: 95.6 FL (ref 81.4–97.8)
MONOCYTES # BLD AUTO: 0.36 K/UL (ref 0–0.85)
MONOCYTES NFR BLD AUTO: 9.1 % (ref 0–13.4)
NEUTROPHILS # BLD AUTO: 2.2 K/UL (ref 2–7.15)
NEUTROPHILS NFR BLD: 55.8 % (ref 44–72)
NRBC # BLD AUTO: 0 K/UL
NRBC BLD-RTO: 0 /100 WBC
PLATELET # BLD AUTO: 223 K/UL (ref 164–446)
PMV BLD AUTO: 9.6 FL (ref 9–12.9)
POTASSIUM SERPL-SCNC: 4 MMOL/L (ref 3.6–5.5)
PROT SERPL-MCNC: 7.1 G/DL (ref 6–8.2)
RBC # BLD AUTO: 4.05 M/UL (ref 4.2–5.4)
SODIUM SERPL-SCNC: 135 MMOL/L (ref 135–145)
WBC # BLD AUTO: 4 K/UL (ref 4.8–10.8)

## 2020-09-16 PROCEDURE — 85025 COMPLETE CBC W/AUTO DIFF WBC: CPT

## 2020-09-16 PROCEDURE — 86304 IMMUNOASSAY TUMOR CA 125: CPT

## 2020-09-16 PROCEDURE — 80053 COMPREHEN METABOLIC PANEL: CPT

## 2020-09-16 PROCEDURE — 36415 COLL VENOUS BLD VENIPUNCTURE: CPT

## 2020-09-21 ENCOUNTER — HOSPITAL ENCOUNTER (OUTPATIENT)
Dept: RADIOLOGY | Facility: MEDICAL CENTER | Age: 75
End: 2020-09-21
Attending: OBSTETRICS & GYNECOLOGY
Payer: MEDICARE

## 2020-09-21 DIAGNOSIS — C54.1 ENDOMETRIAL MALIGNANT NEOPLASM (HCC): ICD-10-CM

## 2020-09-21 PROCEDURE — 71260 CT THORAX DX C+: CPT

## 2020-09-21 PROCEDURE — 700117 HCHG RX CONTRAST REV CODE 255: Performed by: OBSTETRICS & GYNECOLOGY

## 2020-09-21 RX ADMIN — IOHEXOL 100 ML: 350 INJECTION, SOLUTION INTRAVENOUS at 16:36

## 2020-09-21 RX ADMIN — IOHEXOL 25 ML: 240 INJECTION, SOLUTION INTRATHECAL; INTRAVASCULAR; INTRAVENOUS; ORAL at 16:36

## 2021-01-04 ENCOUNTER — HOSPITAL ENCOUNTER (OUTPATIENT)
Dept: RADIOLOGY | Facility: MEDICAL CENTER | Age: 76
End: 2021-01-04
Attending: FAMILY MEDICINE
Payer: MEDICARE

## 2021-01-04 DIAGNOSIS — Z12.31 VISIT FOR SCREENING MAMMOGRAM: ICD-10-CM

## 2021-01-04 PROCEDURE — 77063 BREAST TOMOSYNTHESIS BI: CPT

## 2021-01-15 DIAGNOSIS — Z23 NEED FOR VACCINATION: ICD-10-CM

## 2021-01-22 ENCOUNTER — IMMUNIZATION (OUTPATIENT)
Dept: FAMILY PLANNING/WOMEN'S HEALTH CLINIC | Facility: IMMUNIZATION CENTER | Age: 76
End: 2021-01-22
Payer: MEDICARE

## 2021-01-22 DIAGNOSIS — Z23 ENCOUNTER FOR VACCINATION: Primary | ICD-10-CM

## 2021-01-22 PROCEDURE — 91300 PFIZER SARS-COV-2 VACCINE: CPT

## 2021-01-22 PROCEDURE — 0001A PFIZER SARS-COV-2 VACCINE: CPT

## 2021-02-12 ENCOUNTER — IMMUNIZATION (OUTPATIENT)
Dept: FAMILY PLANNING/WOMEN'S HEALTH CLINIC | Facility: IMMUNIZATION CENTER | Age: 76
End: 2021-02-12
Attending: INTERNAL MEDICINE
Payer: MEDICARE

## 2021-02-12 DIAGNOSIS — Z23 ENCOUNTER FOR VACCINATION: Primary | ICD-10-CM

## 2021-02-12 PROCEDURE — 91300 PFIZER SARS-COV-2 VACCINE: CPT

## 2021-02-12 PROCEDURE — 0002A PFIZER SARS-COV-2 VACCINE: CPT

## 2021-03-17 ENCOUNTER — HOSPITAL ENCOUNTER (OUTPATIENT)
Dept: LAB | Facility: MEDICAL CENTER | Age: 76
End: 2021-03-17
Attending: STUDENT IN AN ORGANIZED HEALTH CARE EDUCATION/TRAINING PROGRAM
Payer: MEDICARE

## 2021-03-17 LAB
ALBUMIN SERPL BCP-MCNC: 4.3 G/DL (ref 3.2–4.9)
ALBUMIN/GLOB SERPL: 1.5 G/DL
ALP SERPL-CCNC: 77 U/L (ref 30–99)
ALT SERPL-CCNC: 14 U/L (ref 2–50)
ANION GAP SERPL CALC-SCNC: 6 MMOL/L (ref 7–16)
AST SERPL-CCNC: 24 U/L (ref 12–45)
BASOPHILS # BLD AUTO: 1.9 % (ref 0–1.8)
BASOPHILS # BLD: 0.09 K/UL (ref 0–0.12)
BILIRUB SERPL-MCNC: 0.7 MG/DL (ref 0.1–1.5)
BUN SERPL-MCNC: 18 MG/DL (ref 8–22)
CALCIUM SERPL-MCNC: 10.4 MG/DL (ref 8.5–10.5)
CANCER AG125 SERPL-ACNC: 6.8 U/ML (ref 0–35)
CHLORIDE SERPL-SCNC: 99 MMOL/L (ref 96–112)
CO2 SERPL-SCNC: 26 MMOL/L (ref 20–33)
CREAT SERPL-MCNC: 0.71 MG/DL (ref 0.5–1.4)
EOSINOPHIL # BLD AUTO: 0.14 K/UL (ref 0–0.51)
EOSINOPHIL NFR BLD: 2.9 % (ref 0–6.9)
ERYTHROCYTE [DISTWIDTH] IN BLOOD BY AUTOMATED COUNT: 42.2 FL (ref 35.9–50)
GLOBULIN SER CALC-MCNC: 2.8 G/DL (ref 1.9–3.5)
GLUCOSE SERPL-MCNC: 96 MG/DL (ref 65–99)
HCT VFR BLD AUTO: 38.3 % (ref 37–47)
HGB BLD-MCNC: 12.4 G/DL (ref 12–16)
IMM GRANULOCYTES # BLD AUTO: 0.02 K/UL (ref 0–0.11)
IMM GRANULOCYTES NFR BLD AUTO: 0.4 % (ref 0–0.9)
LYMPHOCYTES # BLD AUTO: 1.37 K/UL (ref 1–4.8)
LYMPHOCYTES NFR BLD: 28.3 % (ref 22–41)
MCH RBC QN AUTO: 31.6 PG (ref 27–33)
MCHC RBC AUTO-ENTMCNC: 32.4 G/DL (ref 33.6–35)
MCV RBC AUTO: 97.5 FL (ref 81.4–97.8)
MONOCYTES # BLD AUTO: 0.56 K/UL (ref 0–0.85)
MONOCYTES NFR BLD AUTO: 11.6 % (ref 0–13.4)
NEUTROPHILS # BLD AUTO: 2.66 K/UL (ref 2–7.15)
NEUTROPHILS NFR BLD: 54.9 % (ref 44–72)
NRBC # BLD AUTO: 0 K/UL
NRBC BLD-RTO: 0 /100 WBC
PLATELET # BLD AUTO: 242 K/UL (ref 164–446)
PMV BLD AUTO: 9.6 FL (ref 9–12.9)
POTASSIUM SERPL-SCNC: 4.6 MMOL/L (ref 3.6–5.5)
PROT SERPL-MCNC: 7.1 G/DL (ref 6–8.2)
RBC # BLD AUTO: 3.93 M/UL (ref 4.2–5.4)
SODIUM SERPL-SCNC: 131 MMOL/L (ref 135–145)
WBC # BLD AUTO: 4.8 K/UL (ref 4.8–10.8)

## 2021-03-17 PROCEDURE — 80053 COMPREHEN METABOLIC PANEL: CPT

## 2021-03-17 PROCEDURE — 86304 IMMUNOASSAY TUMOR CA 125: CPT

## 2021-03-17 PROCEDURE — 85025 COMPLETE CBC W/AUTO DIFF WBC: CPT

## 2021-03-17 PROCEDURE — 36415 COLL VENOUS BLD VENIPUNCTURE: CPT

## 2021-03-22 ENCOUNTER — HOSPITAL ENCOUNTER (OUTPATIENT)
Dept: RADIOLOGY | Facility: MEDICAL CENTER | Age: 76
End: 2021-03-22
Attending: SPECIALIST
Payer: MEDICARE

## 2021-03-22 DIAGNOSIS — C54.1 ENDOMETRIAL SARCOMA (HCC): ICD-10-CM

## 2021-03-22 PROCEDURE — 71260 CT THORAX DX C+: CPT | Mod: MH

## 2021-03-22 PROCEDURE — 700117 HCHG RX CONTRAST REV CODE 255: Performed by: SPECIALIST

## 2021-03-22 RX ADMIN — IOHEXOL 100 ML: 350 INJECTION, SOLUTION INTRAVENOUS at 11:31

## 2021-03-22 RX ADMIN — IOHEXOL 25 ML: 240 INJECTION, SOLUTION INTRATHECAL; INTRAVASCULAR; INTRAVENOUS; ORAL at 11:31

## 2021-04-22 ENCOUNTER — TELEPHONE (OUTPATIENT)
Dept: SCHEDULING | Facility: IMAGING CENTER | Age: 76
End: 2021-04-22

## 2021-12-27 ENCOUNTER — TELEPHONE (OUTPATIENT)
Dept: SCHEDULING | Facility: IMAGING CENTER | Age: 76
End: 2021-12-27

## 2022-01-06 ENCOUNTER — OFFICE VISIT (OUTPATIENT)
Dept: MEDICAL GROUP | Facility: LAB | Age: 77
End: 2022-01-06
Payer: MEDICARE

## 2022-01-06 VITALS
WEIGHT: 149 LBS | BODY MASS INDEX: 27.25 KG/M2 | DIASTOLIC BLOOD PRESSURE: 70 MMHG | TEMPERATURE: 98.1 F | SYSTOLIC BLOOD PRESSURE: 118 MMHG | OXYGEN SATURATION: 95 % | HEART RATE: 70 BPM

## 2022-01-06 DIAGNOSIS — Z76.89 ENCOUNTER TO ESTABLISH CARE WITH NEW DOCTOR: ICD-10-CM

## 2022-01-06 DIAGNOSIS — Z12.31 ENCOUNTER FOR SCREENING MAMMOGRAM FOR MALIGNANT NEOPLASM OF BREAST: ICD-10-CM

## 2022-01-06 DIAGNOSIS — Z85.42 HISTORY OF UTERINE CANCER: ICD-10-CM

## 2022-01-06 DIAGNOSIS — E89.0 POST-SURGICAL HYPOTHYROIDISM: ICD-10-CM

## 2022-01-06 DIAGNOSIS — E78.5 HYPERLIPIDEMIA, UNSPECIFIED HYPERLIPIDEMIA TYPE: ICD-10-CM

## 2022-01-06 DIAGNOSIS — M79.2 NEUROPATHIC PAIN, LEG, LEFT: ICD-10-CM

## 2022-01-06 PROBLEM — M16.10 PRIMARY LOCALIZED OSTEOARTHROSIS OF THE HIP: Status: ACTIVE | Noted: 2018-09-21

## 2022-01-06 PROBLEM — E07.9 THYROID DYSFUNCTION: Status: ACTIVE | Noted: 2022-01-06

## 2022-01-06 PROBLEM — M81.0 OSTEOPOROSIS, POST-MENOPAUSAL: Status: ACTIVE | Noted: 2018-09-10

## 2022-01-06 PROCEDURE — 99214 OFFICE O/P EST MOD 30 MIN: CPT | Performed by: FAMILY MEDICINE

## 2022-01-06 RX ORDER — ALENDRONATE SODIUM 70 MG/1
TABLET ORAL
COMMUNITY
Start: 2021-12-20 | End: 2022-04-07 | Stop reason: SDUPTHER

## 2022-01-06 RX ORDER — LEVOTHYROXINE SODIUM 88 UG/1
88 TABLET ORAL DAILY
COMMUNITY
Start: 2021-12-20 | End: 2022-01-11 | Stop reason: SDUPTHER

## 2022-01-06 RX ORDER — ROSUVASTATIN CALCIUM 10 MG/1
10 TABLET, COATED ORAL EVERY EVENING
Qty: 90 TABLET | Refills: 3 | Status: SHIPPED | OUTPATIENT
Start: 2022-01-06 | End: 2022-01-21 | Stop reason: SDUPTHER

## 2022-01-06 RX ORDER — GABAPENTIN 300 MG/1
300 CAPSULE ORAL 2 TIMES DAILY
Qty: 180 CAPSULE | Refills: 1 | Status: SHIPPED | OUTPATIENT
Start: 2022-01-06 | End: 2022-01-21 | Stop reason: SDUPTHER

## 2022-01-06 RX ORDER — MULTIVIT WITH MINERALS/LUTEIN
1000 TABLET ORAL DAILY
Status: ON HOLD | COMMUNITY
End: 2024-01-22

## 2022-01-06 RX ORDER — SERTRALINE HYDROCHLORIDE 100 MG/1
TABLET, FILM COATED ORAL
COMMUNITY
Start: 2021-10-22 | End: 2022-01-11 | Stop reason: SDUPTHER

## 2022-01-06 ASSESSMENT — FIBROSIS 4 INDEX: FIB4 SCORE: 1.99

## 2022-01-06 ASSESSMENT — PATIENT HEALTH QUESTIONNAIRE - PHQ9: CLINICAL INTERPRETATION OF PHQ2 SCORE: 0

## 2022-01-06 NOTE — PROGRESS NOTES
CC: Here to establish care    HPI: Established patient, new to me  Alice presents today to establish care, 76 years old female with past medical history significant for history of uterine cancer, history of hypothyroidism status post thyroidectomy, neuropathic pain chronic on the left leg after her knee surgery, hyperlipidemia, family history of heart disease, osteoporosis, depression, GERD.  Reviewed and discussed the following today with the patient.:      1. Encounter to establish care with new doctor  Reviewed records, past medical problems, past surgical history, family/social history and medications.  Patient works at [employer],  and lives with her  Yovany.    2. Encounter for screening mammogram for malignant neoplasm of breast  History of breast implants, patient is concerned because it was mentioned in her last mammogram that the implant has ruptured, she said she is not sure if this is right because of the previous mammogram it was mentioned it was intact.  Would like to have another mammogram she is due for this year.  Denies any pain or discomfort or any concerns about her breasts    3. History of uterine cancer  Diagnosed in 2017, status post radical hysterectomy 2018.  Patient said she has been following up still with radiology oncology for screening and surveillance, due for her CAT scan and screening in March of this year  4. Post-surgical hypothyroidism   Continues to take Synthroid, not sure if it is 88 or 100 mcg daily, patient will check and let me know.  No recent thyroid function checked.    5. Neuropathic pain, leg, left  This is a chronic pain on the left leg after her knee surgery, she said it is well controlled on 2 tablets of gabapentin 300 mg daily.  Requesting refill.  Patient also is taking Mobic for control of pain, discussed with the patient side effects of the medication and advised to take it only for severe pain, and try to change it and alternated with Tylenol as  needed for pain    6. Hyperlipidemia, unspecified hyperlipidemia type  History of hyperlipidemia and strong family history of heart disease with heart attacks at early age, patient on low-dose statin.  No concerns.    Patient Active Problem List    Diagnosis Date Noted   • History of uterine cancer 01/06/2022   • Thyroid dysfunction 01/06/2022   • Encounter to establish care with new doctor 01/06/2022   • Neuropathic pain, leg, left 10/02/2018   • Primary localized osteoarthrosis of the hip 09/21/2018   • Osteoporosis, post-menopausal 09/10/2018   • Post-surgical hypothyroidism 09/10/2018   • LBBB (left bundle branch block) 10/21/2016   • Left knee pain 10/11/2016   • FH: CAD (coronary artery disease) 08/19/2015   • GERD (gastroesophageal reflux disease) 07/09/2014   • Hyperlipidemia 07/09/2014   • Mixed stress and urge urinary incontinence 07/09/2014   • Chronic depression 07/08/2013   • History of total knee arthroplasty 07/08/2013   • Total knee replacement status, left 07/08/2013   • OA (osteoarthritis) of knee 11/06/2012       Current Outpatient Medications   Medication Sig Dispense Refill   • alendronate (FOSAMAX) 70 MG Tab TAKE 1 TABLET BY MOUTH ONCE A WEEK IN THE MORNING WITH A FULL GLASS OF WATER, 30 MINUTES BEFORE THE FIRST MEAL, BEVERAGE OR MEDICATION OF THE DAY. REMAIN UPRIGHT     • levothyroxine (SYNTHROID) 88 MCG Tab Take 88 mcg by mouth every day.     • vitamin E (VITAMIN E) 1000 Unit (450 mg) Cap Take 1,000 Units by mouth every day.     • Fluticasone Propionate (FLONASE NA) Administer  into affected nostril(S).     • gabapentin (NEURONTIN) 300 MG Cap Take 1 Capsule by mouth 2 times a day. 180 Capsule 1   • rosuvastatin (CRESTOR) 10 MG Tab Take 1 Tablet by mouth every evening. 90 Tablet 3   • vitamin D, Ergocalciferol, (DRISDOL) 69791 UNITS Cap capsule Take 50,000 Units by mouth every 7 days.     • ascorbic acid (VITAMIN C) 1000 MG tablet Take  by mouth. (Patient not taking: Reported on 1/6/2022)      • sertraline (ZOLOFT) 100 MG Tab      • Cyanocobalamin (VITAMIN B-12 PO) Take 1 Tab by mouth every day.     • levothyroxine (LEVOXYL) 100 MCG Tab Take 100 mcg by mouth every day. (Patient not taking: Reported on 1/6/2022)     • sertraline (ZOLOFT) 50 MG TABS Take 50 mg by mouth every day.       No current facility-administered medications for this visit.         Allergies as of 01/06/2022 - Reviewed 01/06/2022   Allergen Reaction Noted   • Other food  01/06/2022   • Penicillins  01/12/2011        ROS: Denies any chest pain, Shortness of breath, Changes bowel or bladder, Lower extremity edema.    Physical Exam:  Gen.: Well-developed, well-nourished, no apparent distress,pleasant and cooperative with the examination  Skin:  Warm and dry with good turgor. No rashes or suspicious lesions in visible areas  Eye: PERRLA, conjunctiva and sclera clear, lids normal  HEENT: Normocephalic/atraumatic, sinuses nontender with palpation, noted excessive accumulation of cerumen on the right ear unable to visualize her tympanic membrane on the right, normal tympanic membrane and ear canal in the left ear.  R, nares patent with pink mucosa and clear rhinorrhea, lips without lesions, oropharynx clear.  Neck: Trachea midline,no masses or adenopathy  Thyroid: normal consistency and size. No masses or nodules. Not tender with palpation.  Cor: Regular rate and rhythm without murmur, gallop or rub.  Lungs: Respirations unlabored.Clear to auscultation with equal breath sounds bilaterally. No wheezes, rhonchi.  Abdomen: Soft nontender without hepatosplenomegaly or masses appreciated, normoactive bowel sounds. No hernias.  Extremities: No cyanosis, clubbing or edema, Symmetrical without deformities or malformations. Pulses 2+ and symmetrical both upper and lower extremities  Lymphatic: No abnormal adenopathy of the neck groin or axillae.  Psych: Alert and oriented x 3.Normal affect, judgement,insight and memory.        Assessment and Plan.    76 y.o. female here to establish care    1. Encounter to establish care with new doctor  Reviewed medical history as above    2. Encounter for screening mammogram for malignant neoplasm of breast  Due for breast cancer screening, requesting further evaluation of possible implants fracture, asymptomatic.  - MA-SCREENING MAMMO BILAT W/CAD; Future  - US-BREAST BILAT-COMPLETE; Future    3. History of uterine cancer  Chronic, stable will continue follow-up with oncology, advised to do labs, no concerns patient is asymptomatic  - CBC WITH DIFFERENTIAL; Future  - Comp Metabolic Panel; Future    4. Post-surgical hypothyroidism  Chronic, advised to check her Synthroid dose and let me know because we might need to adjust the medication dose if the thyroid function test is abnormal.  Advised to do thyroid function test she is due  - TSH WITH REFLEX TO FT4; Future    5. Neuropathic pain, leg, left  Chronic, controlled on gabapentin refilled medication today.  - gabapentin (NEURONTIN) 300 MG Cap; Take 1 Capsule by mouth 2 times a day.  Dispense: 180 Capsule; Refill: 1    6. Hyperlipidemia, unspecified hyperlipidemia type  Chronic, stable continue same regimen.  - rosuvastatin (CRESTOR) 10 MG Tab; Take 1 Tablet by mouth every evening.  Dispense: 90 Tablet; Refill: 3  - Lipid Profile; Future    7.excessive cerumen on right ear: Patient advised to use Debrox eardrops 6 drops in on the right ear twice a day and come back in 2 weeks for possible ear lavage.      Please note that this dictation was created using voice recognition software. I have made every reasonable attempt to correct obvious errors but there may be errors of grammar and content that I may have overlooked prior to finalization of this note.

## 2022-01-11 ENCOUNTER — HOSPITAL ENCOUNTER (OUTPATIENT)
Dept: LAB | Facility: MEDICAL CENTER | Age: 77
End: 2022-01-11
Attending: FAMILY MEDICINE
Payer: MEDICARE

## 2022-01-11 DIAGNOSIS — E89.0 POST-SURGICAL HYPOTHYROIDISM: ICD-10-CM

## 2022-01-11 DIAGNOSIS — E78.5 HYPERLIPIDEMIA, UNSPECIFIED HYPERLIPIDEMIA TYPE: ICD-10-CM

## 2022-01-11 DIAGNOSIS — Z85.42 HISTORY OF UTERINE CANCER: ICD-10-CM

## 2022-01-11 LAB
ALBUMIN SERPL BCP-MCNC: 4.5 G/DL (ref 3.2–4.9)
ALBUMIN/GLOB SERPL: 1.8 G/DL
ALP SERPL-CCNC: 69 U/L (ref 30–99)
ALT SERPL-CCNC: 15 U/L (ref 2–50)
ANION GAP SERPL CALC-SCNC: 10 MMOL/L (ref 7–16)
AST SERPL-CCNC: 25 U/L (ref 12–45)
BASOPHILS # BLD AUTO: 1.6 % (ref 0–1.8)
BASOPHILS # BLD: 0.07 K/UL (ref 0–0.12)
BILIRUB SERPL-MCNC: 0.7 MG/DL (ref 0.1–1.5)
BUN SERPL-MCNC: 15 MG/DL (ref 8–22)
CALCIUM SERPL-MCNC: 9.7 MG/DL (ref 8.5–10.5)
CHLORIDE SERPL-SCNC: 103 MMOL/L (ref 96–112)
CHOLEST SERPL-MCNC: 137 MG/DL (ref 100–199)
CO2 SERPL-SCNC: 24 MMOL/L (ref 20–33)
CREAT SERPL-MCNC: 0.66 MG/DL (ref 0.5–1.4)
EOSINOPHIL # BLD AUTO: 0.11 K/UL (ref 0–0.51)
EOSINOPHIL NFR BLD: 2.5 % (ref 0–6.9)
ERYTHROCYTE [DISTWIDTH] IN BLOOD BY AUTOMATED COUNT: 41.1 FL (ref 35.9–50)
FASTING STATUS PATIENT QL REPORTED: NORMAL
GLOBULIN SER CALC-MCNC: 2.5 G/DL (ref 1.9–3.5)
GLUCOSE SERPL-MCNC: 92 MG/DL (ref 65–99)
HCT VFR BLD AUTO: 38.3 % (ref 37–47)
HDLC SERPL-MCNC: 57 MG/DL
HGB BLD-MCNC: 12.6 G/DL (ref 12–16)
IMM GRANULOCYTES # BLD AUTO: 0.01 K/UL (ref 0–0.11)
IMM GRANULOCYTES NFR BLD AUTO: 0.2 % (ref 0–0.9)
LDLC SERPL CALC-MCNC: 64 MG/DL
LYMPHOCYTES # BLD AUTO: 1.39 K/UL (ref 1–4.8)
LYMPHOCYTES NFR BLD: 32.1 % (ref 22–41)
MCH RBC QN AUTO: 31.8 PG (ref 27–33)
MCHC RBC AUTO-ENTMCNC: 32.9 G/DL (ref 33.6–35)
MCV RBC AUTO: 96.7 FL (ref 81.4–97.8)
MONOCYTES # BLD AUTO: 0.43 K/UL (ref 0–0.85)
MONOCYTES NFR BLD AUTO: 9.9 % (ref 0–13.4)
NEUTROPHILS # BLD AUTO: 2.32 K/UL (ref 2–7.15)
NEUTROPHILS NFR BLD: 53.7 % (ref 44–72)
NRBC # BLD AUTO: 0 K/UL
NRBC BLD-RTO: 0 /100 WBC
PLATELET # BLD AUTO: 208 K/UL (ref 164–446)
PMV BLD AUTO: 10.3 FL (ref 9–12.9)
POTASSIUM SERPL-SCNC: 4.3 MMOL/L (ref 3.6–5.5)
PROT SERPL-MCNC: 7 G/DL (ref 6–8.2)
RBC # BLD AUTO: 3.96 M/UL (ref 4.2–5.4)
SODIUM SERPL-SCNC: 137 MMOL/L (ref 135–145)
TRIGL SERPL-MCNC: 82 MG/DL (ref 0–149)
TSH SERPL DL<=0.005 MIU/L-ACNC: 1.97 UIU/ML (ref 0.38–5.33)
WBC # BLD AUTO: 4.3 K/UL (ref 4.8–10.8)

## 2022-01-11 PROCEDURE — 80053 COMPREHEN METABOLIC PANEL: CPT

## 2022-01-11 PROCEDURE — 84443 ASSAY THYROID STIM HORMONE: CPT

## 2022-01-11 PROCEDURE — 36415 COLL VENOUS BLD VENIPUNCTURE: CPT

## 2022-01-11 PROCEDURE — 80061 LIPID PANEL: CPT

## 2022-01-11 PROCEDURE — 85025 COMPLETE CBC W/AUTO DIFF WBC: CPT

## 2022-01-11 RX ORDER — SERTRALINE HYDROCHLORIDE 100 MG/1
100 TABLET, FILM COATED ORAL DAILY
Qty: 30 TABLET | Refills: 3 | Status: SHIPPED | OUTPATIENT
Start: 2022-01-11 | End: 2022-01-21 | Stop reason: SDUPTHER

## 2022-01-11 RX ORDER — LEVOTHYROXINE SODIUM 88 UG/1
88 TABLET ORAL DAILY
Qty: 30 TABLET | Refills: 4 | Status: SHIPPED | OUTPATIENT
Start: 2022-01-11 | End: 2022-01-21 | Stop reason: SDUPTHER

## 2022-01-11 NOTE — TELEPHONE ENCOUNTER
Received request via: Patient    Was the patient seen in the last year in this department? Yes  1/6/22  Does the patient have an active prescription (recently filled or refills available) for medication(s) requested? No

## 2022-01-11 NOTE — TELEPHONE ENCOUNTER
----- Message from Alice Whitlock sent at 1/11/2022 12:54 PM PST -----  Regarding: prescriptions  I am taking 88mcg Levothyroxin and 100mg Sertraline daily

## 2022-01-20 ENCOUNTER — OFFICE VISIT (OUTPATIENT)
Dept: MEDICAL GROUP | Facility: LAB | Age: 77
End: 2022-01-20
Payer: MEDICARE

## 2022-01-20 VITALS
BODY MASS INDEX: 25.69 KG/M2 | SYSTOLIC BLOOD PRESSURE: 120 MMHG | DIASTOLIC BLOOD PRESSURE: 70 MMHG | HEIGHT: 63 IN | HEART RATE: 66 BPM | OXYGEN SATURATION: 95 % | WEIGHT: 145 LBS | TEMPERATURE: 97.7 F

## 2022-01-20 DIAGNOSIS — E07.9 THYROID DYSFUNCTION: ICD-10-CM

## 2022-01-20 DIAGNOSIS — M54.9 ARTHRALGIA OF BACK: ICD-10-CM

## 2022-01-20 DIAGNOSIS — H61.20 WAX IN EAR: ICD-10-CM

## 2022-01-20 DIAGNOSIS — E78.5 HYPERLIPIDEMIA, UNSPECIFIED HYPERLIPIDEMIA TYPE: ICD-10-CM

## 2022-01-20 PROCEDURE — 99214 OFFICE O/P EST MOD 30 MIN: CPT | Performed by: FAMILY MEDICINE

## 2022-01-20 RX ORDER — MELOXICAM 7.5 MG/1
7.5 TABLET ORAL DAILY
Qty: 30 TABLET | Refills: 3 | Status: SHIPPED | OUTPATIENT
Start: 2022-01-20 | End: 2022-01-21 | Stop reason: SDUPTHER

## 2022-01-20 ASSESSMENT — FIBROSIS 4 INDEX: FIB4 SCORE: 2.33

## 2022-01-20 NOTE — PROGRESS NOTES
Chief Complaint:   Chief Complaint   Patient presents with   • Follow-Up     results and cerumen impaction       HPI:Established patient   Alice Whitlock is a 76 y.o. female who presents for follow-up, discussed the following today:    1. Arthralgia of back  Patient has chronic pain in her back and hip and other joints, and she was on regular use of Mobic.  She told me today after I recommended stopping the Mobic, she feels that her joints are all aching, making her sometimes unable to go to her work.  She would like to restart the medication again.  Discussed with the patient kidney function test which was within normal limits.  Aware of side effects from long-term use of Mobic.  Encouraged to use it with Tylenol and avoid prolonged use, will reduce the dose and patient can restart it again.  Because she said it helps her arthralgia pain and make her more functional during the daytime.    2. Wax in ear  Use Debrox for 2 weeks, asymptomatic.  Denies any concerns at this time    3. Hyperlipidemia, unspecified hyperlipidemia type  Reviewed with the patient labs, lipid profile within normal limits.    4. Thyroid dysfunction  History of thyroid disease, reviewed with the patient normal thyroid function test, on 88 mcg of Synthroid.          Past medical history, family history, social history and medications reviewed and updated in the record. Today   Current medications, problem list and allergies reviewed in Epic today   Health maintenance topics are reviewed and updated.    Patient Active Problem List    Diagnosis Date Noted   • Arthralgia of back 01/20/2022   • History of uterine cancer 01/06/2022   • Thyroid dysfunction 01/06/2022   • Encounter to establish care with new doctor 01/06/2022   • Neuropathic pain, leg, left 10/02/2018   • Primary localized osteoarthrosis of the hip 09/21/2018   • Osteoporosis, post-menopausal 09/10/2018   • Post-surgical hypothyroidism 09/10/2018   • LBBB (left bundle branch block)  10/21/2016   • Left knee pain 10/11/2016   • FH: CAD (coronary artery disease) 08/19/2015   • GERD (gastroesophageal reflux disease) 07/09/2014   • Hyperlipidemia 07/09/2014   • Mixed stress and urge urinary incontinence 07/09/2014   • Chronic depression 07/08/2013   • History of total knee arthroplasty 07/08/2013   • Total knee replacement status, left 07/08/2013   • OA (osteoarthritis) of knee 11/06/2012     Family History   Problem Relation Age of Onset   • Heart Disease Other    • Cancer Other    • Hypertension Other    • Cancer Maternal Aunt         breast   • Heart Disease Mother      Social History     Socioeconomic History   • Marital status:      Spouse name: Not on file   • Number of children: Not on file   • Years of education: Not on file   • Highest education level: Not on file   Occupational History   • Not on file   Tobacco Use   • Smoking status: Never Smoker   • Smokeless tobacco: Never Used   Vaping Use   • Vaping Use: Never used   Substance and Sexual Activity   • Alcohol use: Yes     Comment: 2 a month   • Drug use: No   • Sexual activity: Yes     Partners: Male     Comment:     Other Topics Concern   • Not on file   Social History Narrative   • Not on file     Social Determinants of Health     Financial Resource Strain:    • Difficulty of Paying Living Expenses: Not on file   Food Insecurity:    • Worried About Running Out of Food in the Last Year: Not on file   • Ran Out of Food in the Last Year: Not on file   Transportation Needs:    • Lack of Transportation (Medical): Not on file   • Lack of Transportation (Non-Medical): Not on file   Physical Activity:    • Days of Exercise per Week: Not on file   • Minutes of Exercise per Session: Not on file   Stress:    • Feeling of Stress : Not on file   Social Connections:    • Frequency of Communication with Friends and Family: Not on file   • Frequency of Social Gatherings with Friends and Family: Not on file   • Attends [employer]  "Services: Not on file   • Active Member of Clubs or Organizations: Not on file   • Attends Club or Organization Meetings: Not on file   • Marital Status: Not on file   Intimate Partner Violence:    • Fear of Current or Ex-Partner: Not on file   • Emotionally Abused: Not on file   • Physically Abused: Not on file   • Sexually Abused: Not on file   Housing Stability:    • Unable to Pay for Housing in the Last Year: Not on file   • Number of Places Lived in the Last Year: Not on file   • Unstable Housing in the Last Year: Not on file       Current Outpatient Medications   Medication Sig Dispense Refill   • meloxicam (MOBIC) 7.5 MG Tab Take 1 Tablet by mouth every day. 30 Tablet 3   • ascorbic acid (VITAMIN C) 1000 MG tablet Take  by mouth.     • levothyroxine (SYNTHROID) 88 MCG Tab Take 1 Tablet by mouth every day. 30 Tablet 4   • sertraline (ZOLOFT) 100 MG Tab Take 1 Tablet by mouth every day. 30 Tablet 3   • alendronate (FOSAMAX) 70 MG Tab TAKE 1 TABLET BY MOUTH ONCE A WEEK IN THE MORNING WITH A FULL GLASS OF WATER, 30 MINUTES BEFORE THE FIRST MEAL, BEVERAGE OR MEDICATION OF THE DAY. REMAIN UPRIGHT     • vitamin E (VITAMIN E) 1000 Unit (450 mg) Cap Take 1,000 Units by mouth every day.     • Fluticasone Propionate (FLONASE NA) Administer  into affected nostril(S).     • gabapentin (NEURONTIN) 300 MG Cap Take 1 Capsule by mouth 2 times a day. 180 Capsule 1   • rosuvastatin (CRESTOR) 10 MG Tab Take 1 Tablet by mouth every evening. 90 Tablet 3   • Cyanocobalamin (VITAMIN B-12 PO) Take 1 Tab by mouth every day.     • vitamin D, Ergocalciferol, (DRISDOL) 09973 UNITS Cap capsule Take 50,000 Units by mouth every 7 days.       No current facility-administered medications for this visit.         Review Of Systems  As documented in HPI above  PHYSICAL EXAMINATION:    /70 (BP Location: Right arm, Patient Position: Sitting, BP Cuff Size: Adult)   Pulse 66   Temp 36.5 °C (97.7 °F) (Temporal)   Ht 1.6 m (5' 3\")   Wt 65.8 " kg (145 lb)   SpO2 95%   BMI 25.69 kg/m²   Gen.: Well-developed, well-nourished, no apparent distress, pleasant and cooperative with the examination  HEENT: Normocephalic/atraumatic, tympanic membrane clear both ears  Neck: No JVD or bruits, no adenopathy  Cor: Regular rate and rhythm without murmur gallop or rub  Lungs: Clear to auscultation with equal breath sounds bilaterally. No wheezes, rhonchi.  Abdomen: Soft nontender without hepatosplenomegaly or masses appreciated, normoactive bowel sounds  Extremities: No cyanosis, clubbing or edema        ASSESSMENT/Plan:    1. Arthralgia of back   chronic, well controlled on meloxicam use.  Aware of side effects of the medication, discussed with the patient to avoid the prolonged use of meloxicam every day and to try to alternated with Tylenol.  Restarted on a lower dose of Mobic to take it only if Tylenol is not helping.  meloxicam (MOBIC) 7.5 MG Tab   2. Wax in ear   resolved   3. Hyperlipidemia, unspecified hyperlipidemia type   chronic stable no concerns continue current regimen   4. Thyroid dysfunction   chronic well-controlled on 88 mcg, continue current regimen.       Please note that this dictation was created using voice recognition software. I have made every reasonable attempt to correct obvious errors but there may be errors of grammar and content that I may have overlooked prior to finalization of this note.

## 2022-02-08 ENCOUNTER — HOSPITAL ENCOUNTER (OUTPATIENT)
Dept: RADIOLOGY | Facility: MEDICAL CENTER | Age: 77
End: 2022-02-08
Attending: FAMILY MEDICINE
Payer: MEDICARE

## 2022-02-08 DIAGNOSIS — Z12.31 ENCOUNTER FOR SCREENING MAMMOGRAM FOR MALIGNANT NEOPLASM OF BREAST: ICD-10-CM

## 2022-02-11 ENCOUNTER — APPOINTMENT (OUTPATIENT)
Dept: RADIOLOGY | Facility: MEDICAL CENTER | Age: 77
End: 2022-02-11
Attending: EMERGENCY MEDICINE
Payer: COMMERCIAL

## 2022-02-11 ENCOUNTER — HOSPITAL ENCOUNTER (EMERGENCY)
Facility: MEDICAL CENTER | Age: 77
End: 2022-02-11
Attending: EMERGENCY MEDICINE
Payer: COMMERCIAL

## 2022-02-11 VITALS
BODY MASS INDEX: 25.86 KG/M2 | OXYGEN SATURATION: 98 % | HEART RATE: 70 BPM | HEIGHT: 63 IN | WEIGHT: 145.94 LBS | SYSTOLIC BLOOD PRESSURE: 157 MMHG | DIASTOLIC BLOOD PRESSURE: 78 MMHG | RESPIRATION RATE: 16 BRPM | TEMPERATURE: 98 F

## 2022-02-11 DIAGNOSIS — S09.90XA CLOSED HEAD INJURY, INITIAL ENCOUNTER: ICD-10-CM

## 2022-02-11 DIAGNOSIS — S32.10XA CLOSED FRACTURE OF SACRUM, UNSPECIFIED FRACTURE MORPHOLOGY, INITIAL ENCOUNTER (HCC): ICD-10-CM

## 2022-02-11 PROCEDURE — 99284 EMERGENCY DEPT VISIT MOD MDM: CPT

## 2022-02-11 PROCEDURE — 72192 CT PELVIS W/O DYE: CPT

## 2022-02-11 PROCEDURE — 70450 CT HEAD/BRAIN W/O DYE: CPT

## 2022-02-11 ASSESSMENT — FIBROSIS 4 INDEX: FIB4 SCORE: 2.33

## 2022-02-11 NOTE — LETTER
[facility], EMERGENCY DEPT   [address] [place] [address]  Phone: Dept: [phone] - Fax:        [facility] Progress Report and Disability Certification  Date of Service: 2/11/2022   No Show:  No  Date / Time of Next Visit:     Claim Information   Patient Name: Alice Whitlock  Claim Number:     Employer:   [employer] Date of Injury: 2/10/2022     Insurer / TPA:  [employer] ID / SSN:    Occupation: PT [employer] Diagnosis: Diagnoses of Closed head injury, initial encounter and Closed fracture of sacrum, unspecified fracture morphology, initial encounter ([facility]) were pertinent to this visit.    Medical Information   Related to Industrial Injury? Yes    Subjective Complaints:  Physician evaluation and treatment of head and pelvic injury   Objective Findings: Patient with evidence of minor head injury and acute S3 closed sacral fracture   Pre-Existing Condition(s):     Assessment:   Initial Visit    Status: Additional Care Required  Permanent Disability:No    Plan: Consultation    Diagnostics: CT    Comments:       Disability Information   Status: Released to Restricted Duty    From:     Through:   Restrictions are:     Physical Restrictions   Sitting:    Standing:    Stooping:    Bending:      Squatting:    Walking:    Climbing:    Pushing:      Pulling:    Other:    Reaching Above Shoulder (L):   Reaching Above Shoulder (R):       Reaching Below Shoulder (L):    Reaching Below Shoulder (R):      Not to exceed Weight Limits   Carrying(hrs):   Weight Limit(lb):   Lifting(hrs):   Weight  Limit(lb):     Comments:      Repetitive Actions   Hands: i.e. Fine Manipulations from Grasping:     Feet: i.e. Operating Foot Controls:     Driving / Operate Machinery:     Physician Name: Jeff Sam Physician Signature: JEFF Bond M.D. e-Signature:  , Medical Director   Clinic Name / Location: [facility]  [facility], EMERGENCY DEPT  [address]  [place] [address]  [phone]     Clinic Phone Number: Dept: [phone]   Appointment Time:  Visit Start Time:    Check-In Time:  2:28 PM Visit Discharge Time:    Original-Treating Physician or Chiropractor    Page 2-Insurer/TPA    Page 3-Employer    Page 4-Employee

## 2022-02-11 NOTE — ED TRIAGE NOTES
Pt presents by self from [employer] for fall off a step stool at work last night. Fall backwards. Reports pain to tailbone and back of head. Denies LOC. Pt A&Ox4 and ambulatory.     Has this patient been vaccinated for COVID yes

## 2022-02-12 NOTE — ED PROVIDER NOTES
ED Provider Note    CHIEF COMPLAINT  Chief Complaint   Patient presents with   • Fall   • Head Injury       HPI  Alice Whitlock is a 76 y.o. female who presents for evaluation of trauma.  The patient reports that she was at work on a three-step stepstool, she lost her balance and struck the back of her head and also landed on her tailbone.  This was almost 24 hours ago.  She denies loss of consciousness or pain or injury to the neck or back.  No pain or deformity noted to the upper or lower extremities.  She is otherwise healthy and only takes a baby aspirin no history of anticoagulant use.  No focal numbness weakness or tingling to the arms legs or face no nausea or vomiting.  She reports mild headache and also pain with ambulation secondary to pain over her coccyx.  No numbness weakness or tingling or incontinence.  No other complaints reported no high fevers chills night sweats weight loss    REVIEW OF SYSTEMS  See HPI for further details no high fever chills night sweats weight loss. All other systems are negative.     PAST MEDICAL HISTORY  Past Medical History:   Diagnosis Date   • Cancer (HCC) 2015    skin (non melanoma)   • Arthritis     osteoarthritis   • Heart burn     occasional   • LBBB (left bundle branch block)     no cardiologist    • Pain     knee,  joints   • Psychiatric problem     depression   • Unspecified disorder of thyroid     thyroid nodules=thyroidectomy   • Unspecified urinary incontinence        FAMILY HISTORY  Noncontributory    SOCIAL HISTORY  Social History     Socioeconomic History   • Marital status:      Spouse name: Not on file   • Number of children: Not on file   • Years of education: Not on file   • Highest education level: Not on file   Occupational History   • Not on file   Tobacco Use   • Smoking status: Never Smoker   • Smokeless tobacco: Never Used   Vaping Use   • Vaping Use: Never used   Substance and Sexual Activity   • Alcohol use: Yes     Comment: 2 a month   •  Drug use: No   • Sexual activity: Yes     Partners: Male     Comment:     Other Topics Concern   • Not on file   Social History Narrative   • Not on file     Social Determinants of Health     Financial Resource Strain:    • Difficulty of Paying Living Expenses: Not on file   Food Insecurity:    • Worried About Running Out of Food in the Last Year: Not on file   • Ran Out of Food in the Last Year: Not on file   Transportation Needs:    • Lack of Transportation (Medical): Not on file   • Lack of Transportation (Non-Medical): Not on file   Physical Activity:    • Days of Exercise per Week: Not on file   • Minutes of Exercise per Session: Not on file   Stress:    • Feeling of Stress : Not on file   Social Connections:    • Frequency of Communication with Friends and Family: Not on file   • Frequency of Social Gatherings with Friends and Family: Not on file   • Attends Hoahaoism Services: Not on file   • Active Member of Clubs or Organizations: Not on file   • Attends Club or Organization Meetings: Not on file   • Marital Status: Not on file   Intimate Partner Violence:    • Fear of Current or Ex-Partner: Not on file   • Emotionally Abused: Not on file   • Physically Abused: Not on file   • Sexually Abused: Not on file   Housing Stability:    • Unable to Pay for Housing in the Last Year: Not on file   • Number of Places Lived in the Last Year: Not on file   • Unstable Housing in the Last Year: Not on file       SURGICAL HISTORY  Past Surgical History:   Procedure Laterality Date   • HYSTERECTOMY ROBOTIC XI  1/17/2019    Procedure: HYSTERECTOMY ROBOTIC XI;  Surgeon: Shahid Paez M.D.;  Location: [facility];  Service: Gynecology   • SALPINGECTOMY Bilateral 1/17/2019    Procedure: SALPINGECTOMY;  Surgeon: Shahid Paez M.D.;  Location: [facility];  Service: Gynecology   • OOPHORECTOMY Bilateral 1/17/2019    Procedure: OOPHORECTOMY;  Surgeon: Shahid Paez M.D.;  Location: [facility]  "[facility];  Service: Gynecology   • NODE BIOPSY SENTINEL  1/17/2019    Procedure: NODE BIOPSY SENTINEL;  Surgeon: Shahid Paez M.D.;  Location: [facility];  Service: Gynecology   • KNEE ARTHROPLASTY TOTAL Left 10/11/2016    Procedure: KNEE ARTHROPLASTY TOTAL;  Surgeon: Beverly Chauhan M.D.;  Location: [facility];  Service:    • KNEE ARTHROPLASTY TOTAL  11/6/2012    Performed by Beverly Chauhan M.D. at [facility]   • KNEE ARTHROSCOPY  1/18/2011    Performed by MARILEE NAGEL at [facility]   • MENISCECTOMY, KNEE, MEDIAL  1/18/2011    Performed by MARILEE NAGEL at [facility]   • MENISCECTOMY  1/18/2011    Performed by MARILEE NAGEL at [facility]   • SYNOVECTOMY  1/18/2011    Performed by MARILEE NAGEL at [facility]   • BLADDER SUSPENSION  2000   • THYROIDECTOMY TOTAL  1990   • MAMMOPLASTY AUGMENTATION  1981   • OH BREAST AUGMENTATION WITH IMPLANT  1980   • OTHER SURGICAL PROCEDURE  1979    excision thyroid nodules   • LAPAROTOMY  1972    endometriosis   • TONSILLECTOMY  1957       CURRENT MEDICATIONS  Home Medications     Reviewed by Marivel Henson R.N. (Registered Nurse) on 02/11/22 at 1442  Med List Status: Not Addressed   Medication Last Dose Status   alendronate (FOSAMAX) 70 MG Tab  Active   ascorbic acid (VITAMIN C) 1000 MG tablet  Active   Cyanocobalamin (VITAMIN B-12 PO)  Active   Fluticasone Propionate (FLONASE NA)  Active   gabapentin (NEURONTIN) 300 MG Cap  Active   levothyroxine (SYNTHROID) 88 MCG Tab  Active   meloxicam (MOBIC) 7.5 MG Tab  Active   rosuvastatin (CRESTOR) 10 MG Tab  Active   sertraline (ZOLOFT) 100 MG Tab  Active   vitamin D, Ergocalciferol, (DRISDOL) 86363 UNITS Cap capsule  Active   vitamin E (VITAMIN E) 1000 Unit (450 mg) Cap  Active                ALLERGIES  Allergies   Allergen Reactions   • Other Food      Oranges and Peaches   • Penicillins      Reaction unknown \"fever\" " "      PHYSICAL EXAM  VITAL SIGNS: BP (!) 161/78   Pulse 72   Temp 37 °C (98.6 °F) (Temporal)   Resp 16   Ht 1.6 m (5' 3\")   Wt 66.2 kg (145 lb 15.1 oz)   SpO2 97%   BMI 25.85 kg/m²       Constitutional: Well developed, Well nourished, No acute distress, Non-toxic appearance.   HENT: Normocephalic, no hemotympanum negative silva sign atraumatic, Bilateral external ears normal, Oropharynx moist, No oral exudates, Nose normal.   Eyes: PERRLA, EOMI, Conjunctiva normal, No discharge.   Neck: Normal range of motion, No midline bony tenderness, Supple, No stridor.   Cardiovascular: Normal heart rate, Normal rhythm, No murmurs, No rubs, No gallops.   Thorax & Lungs: Normal breath sounds, No respiratory distress, No wheezing, No chest tenderness.   Abdomen: Bowel sounds normal, Soft, No tenderness, No masses, No pulsatile masses.   Skin: Warm, Dry, No erythema, No rash.   Back: No midline bony tenderness, No CVA tenderness.   Extremities: Intact distal pulses, No edema, No tenderness, No cyanosis, No clubbing.   Musculoskeletal: Good range of motion in all major joints. No tenderness to palpation or major deformities noted.   Neurologic: Alert & oriented x 3, Normal motor function, Normal sensory function, No focal deficits noted.   Psychiatric: Anxious    CT-PELVIS W/O   Final Result      1.  Apparent very subtle nondisplaced buckle fracture of the anterior cortex of S3.      CT-HEAD W/O   Final Result      1.  Cerebral atrophy.      2.  White matter lucencies most consistent with small vessel ischemic change versus demyelination or gliosis.      3.  Otherwise, Head CT without contrast with no acute findings. No evidence of acute cerebral infarction, hemorrhage or mass lesion.               COURSE & MEDICAL DECISION MAKING  Pertinent Labs & Imaging studies reviewed. (See chart for details)  Patient presented here around 24 hours after a ground-level fall.  Given her age and height of fall with head and pelvic injury " CT scan imagings were clinically merited.  CT scan of the head does not demonstrate any acute process such as intracranial hemorrhage skull fracture etc.  She has a normal neurological exam.  There does appear to be a very subtle nondisplaced buckle fracture of S3 and she has no neurological deficits or evidence of significant soft tissue injury.  I have counseled her to use a blowup donut pillow.    FINAL IMPRESSION  1.  Closed head injury  2.  Acute S3 sacral fracture without displacement      Electronically signed by: Erik Sam M.D., 2/11/2022 4:15 PM

## 2022-02-16 ENCOUNTER — HOSPITAL ENCOUNTER (OUTPATIENT)
Dept: RADIOLOGY | Facility: MEDICAL CENTER | Age: 77
End: 2022-02-16
Attending: FAMILY MEDICINE
Payer: MEDICARE

## 2022-02-16 DIAGNOSIS — Z98.82 BREAST IMPLANT STATUS: ICD-10-CM

## 2022-02-16 PROCEDURE — G0279 TOMOSYNTHESIS, MAMMO: HCPCS

## 2022-02-22 ENCOUNTER — HOSPITAL ENCOUNTER (OUTPATIENT)
Dept: LAB | Facility: MEDICAL CENTER | Age: 77
End: 2022-02-22
Attending: STUDENT IN AN ORGANIZED HEALTH CARE EDUCATION/TRAINING PROGRAM
Payer: MEDICARE

## 2022-02-22 LAB
ANION GAP SERPL CALC-SCNC: 13 MMOL/L (ref 7–16)
BUN SERPL-MCNC: 18 MG/DL (ref 8–22)
CALCIUM SERPL-MCNC: 9.9 MG/DL (ref 8.5–10.5)
CHLORIDE SERPL-SCNC: 101 MMOL/L (ref 96–112)
CO2 SERPL-SCNC: 22 MMOL/L (ref 20–33)
CREAT SERPL-MCNC: 0.6 MG/DL (ref 0.5–1.4)
GLUCOSE SERPL-MCNC: 99 MG/DL (ref 65–99)
POTASSIUM SERPL-SCNC: 4.6 MMOL/L (ref 3.6–5.5)
SODIUM SERPL-SCNC: 136 MMOL/L (ref 135–145)

## 2022-02-22 PROCEDURE — 80048 BASIC METABOLIC PNL TOTAL CA: CPT

## 2022-02-22 PROCEDURE — 36415 COLL VENOUS BLD VENIPUNCTURE: CPT

## 2022-03-01 ENCOUNTER — HOSPITAL ENCOUNTER (OUTPATIENT)
Dept: RADIOLOGY | Facility: MEDICAL CENTER | Age: 77
End: 2022-03-01
Attending: SPECIALIST
Payer: MEDICARE

## 2022-03-01 DIAGNOSIS — C54.1 ENDOMETRIAL SARCOMA (HCC): ICD-10-CM

## 2022-03-01 PROCEDURE — 71260 CT THORAX DX C+: CPT | Mod: MH

## 2022-03-01 PROCEDURE — 700117 HCHG RX CONTRAST REV CODE 255: Performed by: SPECIALIST

## 2022-03-01 RX ADMIN — IOHEXOL 100 ML: 350 INJECTION, SOLUTION INTRAVENOUS at 13:26

## 2022-03-01 RX ADMIN — IOHEXOL 25 ML: 240 INJECTION, SOLUTION INTRATHECAL; INTRAVASCULAR; INTRAVENOUS; ORAL at 13:28

## 2022-03-04 ENCOUNTER — OFFICE VISIT (OUTPATIENT)
Dept: MEDICAL GROUP | Facility: LAB | Age: 77
End: 2022-03-04
Payer: MEDICARE

## 2022-03-04 VITALS
WEIGHT: 143 LBS | BODY MASS INDEX: 25.34 KG/M2 | SYSTOLIC BLOOD PRESSURE: 124 MMHG | DIASTOLIC BLOOD PRESSURE: 70 MMHG | RESPIRATION RATE: 16 BRPM | HEIGHT: 63 IN | OXYGEN SATURATION: 97 % | TEMPERATURE: 97.9 F | HEART RATE: 70 BPM

## 2022-03-04 DIAGNOSIS — M79.2 NEUROPATHIC PAIN, LEG, LEFT: ICD-10-CM

## 2022-03-04 DIAGNOSIS — S32.10XD CLOSED FRACTURE OF SACRUM WITH ROUTINE HEALING, UNSPECIFIED PORTION OF SACRUM, SUBSEQUENT ENCOUNTER: ICD-10-CM

## 2022-03-04 PROCEDURE — 99214 OFFICE O/P EST MOD 30 MIN: CPT | Performed by: PHYSICIAN ASSISTANT

## 2022-03-04 RX ORDER — MELOXICAM 7.5 MG/1
7.5 TABLET ORAL
Qty: 28 TABLET | Refills: 0 | Status: SHIPPED | OUTPATIENT
Start: 2022-03-04 | End: 2022-03-18

## 2022-03-04 ASSESSMENT — FIBROSIS 4 INDEX: FIB4 SCORE: 2.33

## 2022-03-04 NOTE — LETTER
[facility]  [id number]     March 4, 2022    Patient: Alice Whitlock   YOB: 1945   Date of Visit: 3/4/2022       To Whom It May Concern:    Alice Whitlock was seen and treated in our department on 3/4/2022. Please excuse her from work from 03/04/2022 through 03/11/2022 for a medical condition      Sincerely,     Cecilia Latham P.A.-C.

## 2022-03-04 NOTE — PROGRESS NOTES
"Subjective:     CC: Leg pain    HPI:   Alice here today with    XXXWorker's Comp. related injuryXXX    Patient is off a stepstool 02/10/2022, Patient did not have any loss of consciousness although she did strike her head with the fall.  She was evaluated in the ER 02/11/2022, CT head showed no acute findings.  CT pelvis showed a \"Apparent very subtle nondisplaced buckle fracture of the anterior cortex of S3\", patient was advised to use a blowup donut pillow. Pt was referred to [facility].    Additionally, patient had a CT abdomen, chest, pelvis with IV and oral contrast for surveillance imaging endometrial malignancy dated 03/01/2022 which did not have any acute neuro or musculoskeletal findings.    At this time, patient reports sciatica-type pain in the L leg which was previously a well-controlled chronic problem for her but has lately worsened. She reports some underlying hip arthritis which was previously well controlled with meloxicam.     She has been taking tylenol OTC  She was given a muscle relaxer and steroid at [facility] with minimal improvement in symptoms.  Patient would like to resume meloxicam in the short-term for pain control    Pt works as a  at [employer].  Patient has not taken any time off work and her job does require significant of ambulation, lifting, turning        ROS:  ROS negative except as indicated above  Current Outpatient Medications Ordered in Epic   Medication Sig Dispense Refill   • meloxicam (MOBIC) 7.5 MG Tab Take 1 Tablet by mouth 2 times daily with meals as needed for Moderate Pain for up to 14 days. 28 Tablet 0   • gabapentin (NEURONTIN) 300 MG Cap Take 1 Capsule by mouth 2 times a day. 180 Capsule 1   • levothyroxine (SYNTHROID) 88 MCG Tab Take 1 Tablet by mouth every day. 30 Tablet 6   • sertraline (ZOLOFT) 100 MG Tab Take 1 Tablet by mouth every day. 30 Tablet 3   • meloxicam (MOBIC) 7.5 MG Tab Take 1 Tablet by mouth every day. 30 Tablet 6   • rosuvastatin " "(CRESTOR) 10 MG Tab Take 1 Tablet by mouth every evening. 90 Tablet 3   • alendronate (FOSAMAX) 70 MG Tab TAKE 1 TABLET BY MOUTH ONCE A WEEK IN THE MORNING WITH A FULL GLASS OF WATER, 30 MINUTES BEFORE THE FIRST MEAL, BEVERAGE OR MEDICATION OF THE DAY. REMAIN UPRIGHT     • ascorbic acid (VITAMIN C) 1000 MG tablet Take  by mouth.     • vitamin E (VITAMIN E) 1000 Unit (450 mg) Cap Take 1,000 Units by mouth every day.     • Fluticasone Propionate (FLONASE NA) Administer  into affected nostril(S).     • Cyanocobalamin (VITAMIN B-12 PO) Take 1 Tab by mouth every day.     • vitamin D, Ergocalciferol, (DRISDOL) 90205 UNITS Cap capsule Take 50,000 Units by mouth every 7 days.       No current [facility]-ordered facility-administered medications on file.       Objective:     Exam:  /70   Pulse 70   Temp 36.6 °C (97.9 °F)   Resp 16   Ht 1.6 m (5' 3\")   Wt 64.9 kg (143 lb)   SpO2 97%   BMI 25.33 kg/m²  Body mass index is 25.33 kg/m².    Gen: Alert and oriented, No apparent distress.  Neck: Neck is supple without lymphadenopathy.  Lungs: Normal effort, CTA bilaterally, no wheezes, rhonchi, or rales  CV: Regular rate and rhythm. No murmurs, rubs, or gallops.  Ext: No clubbing, cyanosis, edema.      Assessment & Plan:     76 y.o. female with the following -     1. Neuropathic pain, leg, left  2. Closed fracture of sacrum with routine healing, unspecified portion of sacrum, subsequent encounter  New problem, pain poorly controlled  Resume meloxicam 7.5 mg twice daily as needed for up to 14 days  continue Tylenol 500 mg 3 times daily as needed  Continue gabapentin 300 mg twice daily  Ortho referral ordered    I spent a total of 20 minutes with record review, exam, communication with the patient, communication with other providers, and documentation of this encounter.      Return if symptoms worsen or fail to improve.    Please note that this dictation was created using voice recognition software. I have made every reasonable " attempt to correct obvious errors, but there may be errors of grammar and possibly content that I did not discover before finalizing the note.

## 2022-04-07 RX ORDER — ALENDRONATE SODIUM 70 MG/1
TABLET ORAL
Qty: 4 TABLET | Refills: 6 | Status: SHIPPED | OUTPATIENT
Start: 2022-04-07 | End: 2023-03-31 | Stop reason: SDUPTHER

## 2022-04-07 NOTE — TELEPHONE ENCOUNTER
Received request via: Pharmacy    Was the patient seen in the last year in this department? Yes  3/4/22Does the patient have an active prescription (recently filled or refills available) for medication(s) requested? No

## 2022-04-11 DIAGNOSIS — E78.5 HYPERLIPIDEMIA, UNSPECIFIED HYPERLIPIDEMIA TYPE: ICD-10-CM

## 2022-04-11 DIAGNOSIS — M54.9 ARTHRALGIA OF BACK: ICD-10-CM

## 2022-04-11 DIAGNOSIS — M79.2 NEUROPATHIC PAIN, LEG, LEFT: ICD-10-CM

## 2022-04-11 NOTE — TELEPHONE ENCOUNTER
----- Message from Alice Whitlock sent at 4/10/2022  1:53 PM PDT -----  Regarding: Prescriptions   All meds.  Sertraline 50mg,  Meloxicam 7.5 mg, Levothyroxine 88mcg, Rosuvastatin 10mg,  Alendronate 70mg,  [id number].  Thanks,    Ulisses Whitlock  
pt denies pain

## 2022-04-12 RX ORDER — GABAPENTIN 300 MG/1
300 CAPSULE ORAL 2 TIMES DAILY
Qty: 180 CAPSULE | Refills: 1 | Status: SHIPPED | OUTPATIENT
Start: 2022-04-12 | End: 2023-05-09

## 2022-04-12 RX ORDER — LEVOTHYROXINE SODIUM 88 UG/1
88 TABLET ORAL DAILY
Qty: 90 TABLET | Refills: 3 | Status: SHIPPED | OUTPATIENT
Start: 2022-04-12 | End: 2023-06-01

## 2022-04-12 RX ORDER — ROSUVASTATIN CALCIUM 10 MG/1
10 TABLET, COATED ORAL EVERY EVENING
Qty: 90 TABLET | Refills: 3 | Status: SHIPPED | OUTPATIENT
Start: 2022-04-12 | End: 2023-02-23

## 2022-04-12 RX ORDER — SERTRALINE HYDROCHLORIDE 100 MG/1
100 TABLET, FILM COATED ORAL DAILY
Qty: 90 TABLET | Refills: 3 | Status: SHIPPED | OUTPATIENT
Start: 2022-04-12 | End: 2023-05-02

## 2022-04-12 RX ORDER — MELOXICAM 7.5 MG/1
7.5 TABLET ORAL DAILY
Qty: 90 TABLET | Refills: 3 | OUTPATIENT
Start: 2022-04-12

## 2022-07-28 ENCOUNTER — OFFICE VISIT (OUTPATIENT)
Dept: MEDICAL GROUP | Facility: LAB | Age: 77
End: 2022-07-28
Payer: MEDICARE

## 2022-07-28 VITALS
DIASTOLIC BLOOD PRESSURE: 72 MMHG | SYSTOLIC BLOOD PRESSURE: 130 MMHG | OXYGEN SATURATION: 96 % | HEIGHT: 63 IN | HEART RATE: 74 BPM | TEMPERATURE: 98.2 F | RESPIRATION RATE: 16 BRPM | BODY MASS INDEX: 25.16 KG/M2 | WEIGHT: 142 LBS

## 2022-07-28 DIAGNOSIS — S32.10XD CLOSED FRACTURE OF SACRUM WITH ROUTINE HEALING, UNSPECIFIED PORTION OF SACRUM, SUBSEQUENT ENCOUNTER: ICD-10-CM

## 2022-07-28 PROCEDURE — 99213 OFFICE O/P EST LOW 20 MIN: CPT | Performed by: FAMILY MEDICINE

## 2022-07-28 RX ORDER — CYCLOBENZAPRINE HCL 5 MG
5 TABLET ORAL
Qty: 20 TABLET | Refills: 1 | Status: SHIPPED | OUTPATIENT
Start: 2022-07-28 | End: 2022-10-06

## 2022-07-28 RX ORDER — MELOXICAM 7.5 MG/1
7.5 TABLET ORAL DAILY
COMMUNITY
Start: 2022-07-09 | End: 2022-07-28

## 2022-07-28 RX ORDER — MELOXICAM 15 MG/1
15 TABLET ORAL DAILY
Qty: 30 TABLET | Refills: 1 | Status: SHIPPED | OUTPATIENT
Start: 2022-07-28 | End: 2022-10-06

## 2022-07-28 ASSESSMENT — FIBROSIS 4 INDEX: FIB4 SCORE: 2.33

## 2022-07-28 NOTE — PROGRESS NOTES
Chief Complaint:   Chief Complaint   Patient presents with   • Paperwork     DMV paperwork   • Medication Management     Meloxicam       HPI: Established patient  Alice Whitlock is a 76 y.o. female who presents for follow-up, addressed the following concerns as follow today:    Patient would like me to fill out her paperwork for DMV to be able to continue driving.  She denies any memory changes or changes in her baseline of health that might indicate increased risk for car accidents.    1. Closed fracture of sacrum with routine healing, unspecified portion of sacrum, subsequent encounter  This is a sequelae of a recent fall, patient was evaluated at emergency room, nondisplaced fracture of the lower part of the sacrum was diagnosed, she has an appointment to establish with a spine surgeon in August, she said meloxicam helps the pain but she would like it to be increased because her pain is not well controlled.  Patient was taking 7.5 mg daily.  She continues to do physical therapy and that improves her concern          Past medical history, family history, social history and medications reviewed and updated in the record. today  Current medications, problem list and allergies reviewed in Epic today  Health maintenance topics are reviewed and updated.    Patient Active Problem List    Diagnosis Date Noted   • Arthralgia of back 01/20/2022   • History of uterine cancer 01/06/2022   • Thyroid dysfunction 01/06/2022   • Encounter to establish care with new doctor 01/06/2022   • Neuropathic pain, leg, left 10/02/2018   • Primary localized osteoarthrosis of the hip 09/21/2018   • Osteoporosis, post-menopausal 09/10/2018   • Post-surgical hypothyroidism 09/10/2018   • LBBB (left bundle branch block) 10/21/2016   • Left knee pain 10/11/2016   • FH: CAD (coronary artery disease) 08/19/2015   • GERD (gastroesophageal reflux disease) 07/09/2014   • Hyperlipidemia 07/09/2014   • Mixed stress and urge urinary incontinence  07/09/2014   • Chronic depression 07/08/2013   • History of total knee arthroplasty 07/08/2013   • Total knee replacement status, left 07/08/2013   • OA (osteoarthritis) of knee 11/06/2012     Family History   Problem Relation Age of Onset   • Heart Disease Other    • Cancer Other    • Hypertension Other    • Cancer Maternal Aunt         breast   • Heart Disease Mother      Social History     Socioeconomic History   • Marital status:      Spouse name: Not on file   • Number of children: Not on file   • Years of education: Not on file   • Highest education level: Not on file   Occupational History   • Not on file   Tobacco Use   • Smoking status: Never Smoker   • Smokeless tobacco: Never Used   Vaping Use   • Vaping Use: Never used   Substance and Sexual Activity   • Alcohol use: Yes     Comment: 2 a month   • Drug use: No   • Sexual activity: Yes     Partners: Male     Comment:     Other Topics Concern   • Not on file   Social History Narrative   • Not on file     Social Determinants of Health     Financial Resource Strain: Not on file   Food Insecurity: Not on file   Transportation Needs: Not on file   Physical Activity: Not on file   Stress: Not on file   Social Connections: Not on file   Intimate Partner Violence: Not on file   Housing Stability: Not on file       Current Outpatient Medications   Medication Sig Dispense Refill   • cyclobenzaprine (FLEXERIL) 5 mg tablet Take 1 Tablet by mouth 1 time a day as needed for Moderate Pain or Muscle Spasms. 20 Tablet 1   • meloxicam (MOBIC) 15 MG tablet Take 1 Tablet by mouth every day. 30 Tablet 1   • gabapentin (NEURONTIN) 300 MG Cap Take 1 Capsule by mouth 2 times a day. 180 Capsule 1   • levothyroxine (SYNTHROID) 88 MCG Tab Take 1 Tablet by mouth every day. 90 Tablet 3   • sertraline (ZOLOFT) 100 MG Tab Take 1 Tablet by mouth every day. 90 Tablet 3   • rosuvastatin (CRESTOR) 10 MG Tab Take 1 Tablet by mouth every evening. 90 Tablet 3   • alendronate  "(FOSAMAX) 70 MG Tab TAKE 1 TABLET BY MOUTH ONCE A WEEK IN THE MORNING WITH A FULL GLASS OF WATER, 30 MINUTES BEFORE THE FIRST MEAL, BEVERAGE OR MEDICATION OF THE DAY. REMAIN UPRIGHT 4 Tablet 6   • ascorbic acid (VITAMIN C) 1000 MG tablet Take  by mouth.     • vitamin E (VITAMIN E) 1000 Unit (450 mg) Cap Take 1,000 Units by mouth every day.     • Fluticasone Propionate (FLONASE NA) Administer  into affected nostril(S).     • Cyanocobalamin (VITAMIN B-12 PO) Take 1 Tab by mouth every day.     • vitamin D, Ergocalciferol, (DRISDOL) 64345 UNITS Cap capsule Take 50,000 Units by mouth every 7 days.       No current facility-administered medications for this visit.         Review Of Systems  As documented in HPI above  PHYSICAL EXAMINATION:    /72 (BP Location: Right arm, Patient Position: Sitting, BP Cuff Size: Adult)   Pulse 74   Temp 36.8 °C (98.2 °F) (Temporal)   Resp 16   Ht 1.6 m (5' 3\")   Wt 64.4 kg (142 lb)   SpO2 96%   BMI 25.15 kg/m²   Gen.: Well-developed, well-nourished, no apparent distress, pleasant and cooperative with the examination  HEENT: Normocephalic/atraumatic,    Neck: No JVD or bruits, no adenopathy  Cor: Regular rate and rhythm without murmur gallop or rub  Lungs: Clear to auscultation with equal breath sounds bilaterally. No wheezes, rhonchi.  Abdomen: Soft nontender without hepatosplenomegaly or masses appreciated, normoactive bowel sounds  Extremities: No cyanosis, clubbing or edema          ASSESSMENT/Plan:  1. Closed fracture of sacrum with routine healing, unspecified portion of sacrum, subsequent encounter   chronic ongoing problem status post fall this is a subsequent visit  Advised to continue with meloxicam I will increase the dose to 15 mg daily, advised to take muscle relaxant 5 mg as needed at nighttime for muscle spasms, follow-up with neurosurgeon as directed patient already has an appointment.    Fall risk discussed with the patient and advised to avoid things that " might lead to another fall.     Forms to submit to DMV was handed back to patient today, scanned to chart  Please note that this dictation was created using voice recognition software. I have worked with consultants from the vendor as well as technical experts from [facility] to optimize the interface. I have made every reasonable attempt to correct obvious errors, but I expect that there are errors of grammar and possibly content that I did not discover before finalizing the note.

## 2022-09-29 ENCOUNTER — HOSPITAL ENCOUNTER (OUTPATIENT)
Dept: LAB | Facility: MEDICAL CENTER | Age: 77
End: 2022-09-29
Attending: FAMILY MEDICINE
Payer: MEDICARE

## 2022-09-29 ENCOUNTER — OFFICE VISIT (OUTPATIENT)
Dept: MEDICAL GROUP | Facility: LAB | Age: 77
End: 2022-09-29
Payer: MEDICARE

## 2022-09-29 VITALS
HEIGHT: 63 IN | WEIGHT: 143 LBS | BODY MASS INDEX: 25.34 KG/M2 | TEMPERATURE: 97 F | RESPIRATION RATE: 16 BRPM | DIASTOLIC BLOOD PRESSURE: 74 MMHG | OXYGEN SATURATION: 96 % | HEART RATE: 82 BPM | SYSTOLIC BLOOD PRESSURE: 124 MMHG

## 2022-09-29 DIAGNOSIS — H61.23 BILATERAL IMPACTED CERUMEN: ICD-10-CM

## 2022-09-29 DIAGNOSIS — R53.83 OTHER FATIGUE: ICD-10-CM

## 2022-09-29 DIAGNOSIS — R25.2 LEG CRAMPING: ICD-10-CM

## 2022-09-29 DIAGNOSIS — M79.89 LEFT LEG SWELLING: ICD-10-CM

## 2022-09-29 DIAGNOSIS — M79.644 PAIN OF RIGHT THUMB: ICD-10-CM

## 2022-09-29 LAB
ALBUMIN SERPL BCP-MCNC: 4.4 G/DL (ref 3.2–4.9)
ALBUMIN/GLOB SERPL: 1.6 G/DL
ALP SERPL-CCNC: 91 U/L (ref 30–99)
ALT SERPL-CCNC: 14 U/L (ref 2–50)
ANION GAP SERPL CALC-SCNC: 12 MMOL/L (ref 7–16)
AST SERPL-CCNC: 22 U/L (ref 12–45)
BASOPHILS # BLD AUTO: 1.8 % (ref 0–1.8)
BASOPHILS # BLD: 0.08 K/UL (ref 0–0.12)
BILIRUB SERPL-MCNC: 0.7 MG/DL (ref 0.1–1.5)
BUN SERPL-MCNC: 17 MG/DL (ref 8–22)
CA-I BLD ISE-SCNC: 1.47 MMOL/L (ref 1.1–1.3)
CA-I SERPL-SCNC: 1.5 MMOL/L (ref 1.1–1.3)
CALCIUM SERPL-MCNC: 10.1 MG/DL (ref 8.5–10.5)
CHLORIDE SERPL-SCNC: 102 MMOL/L (ref 96–112)
CO2 SERPL-SCNC: 23 MMOL/L (ref 20–33)
CREAT SERPL-MCNC: 0.68 MG/DL (ref 0.5–1.4)
EOSINOPHIL # BLD AUTO: 0.14 K/UL (ref 0–0.51)
EOSINOPHIL NFR BLD: 3.1 % (ref 0–6.9)
ERYTHROCYTE [DISTWIDTH] IN BLOOD BY AUTOMATED COUNT: 43.6 FL (ref 35.9–50)
GFR SERPLBLD CREATININE-BSD FMLA CKD-EPI: 90 ML/MIN/1.73 M 2
GLOBULIN SER CALC-MCNC: 2.7 G/DL (ref 1.9–3.5)
GLUCOSE SERPL-MCNC: 77 MG/DL (ref 65–99)
HCT VFR BLD AUTO: 40.2 % (ref 37–47)
HGB BLD-MCNC: 13.2 G/DL (ref 12–16)
IMM GRANULOCYTES # BLD AUTO: 0 K/UL (ref 0–0.11)
IMM GRANULOCYTES NFR BLD AUTO: 0 % (ref 0–0.9)
LYMPHOCYTES # BLD AUTO: 1.22 K/UL (ref 1–4.8)
LYMPHOCYTES NFR BLD: 27.3 % (ref 22–41)
MCH RBC QN AUTO: 32.4 PG (ref 27–33)
MCHC RBC AUTO-ENTMCNC: 32.8 G/DL (ref 33.6–35)
MCV RBC AUTO: 98.5 FL (ref 81.4–97.8)
MONOCYTES # BLD AUTO: 0.64 K/UL (ref 0–0.85)
MONOCYTES NFR BLD AUTO: 14.3 % (ref 0–13.4)
NEUTROPHILS # BLD AUTO: 2.39 K/UL (ref 2–7.15)
NEUTROPHILS NFR BLD: 53.5 % (ref 44–72)
NRBC # BLD AUTO: 0 K/UL
NRBC BLD-RTO: 0 /100 WBC
PLATELET # BLD AUTO: 253 K/UL (ref 164–446)
PMV BLD AUTO: 9.1 FL (ref 9–12.9)
POTASSIUM SERPL-SCNC: 4.4 MMOL/L (ref 3.6–5.5)
PROT SERPL-MCNC: 7.1 G/DL (ref 6–8.2)
PTH-INTACT SERPL-MCNC: 73.9 PG/ML (ref 14–72)
RBC # BLD AUTO: 4.08 M/UL (ref 4.2–5.4)
SODIUM SERPL-SCNC: 137 MMOL/L (ref 135–145)
TSH SERPL DL<=0.005 MIU/L-ACNC: 1.39 UIU/ML (ref 0.38–5.33)
WBC # BLD AUTO: 4.5 K/UL (ref 4.8–10.8)

## 2022-09-29 PROCEDURE — 82330 ASSAY OF CALCIUM: CPT

## 2022-09-29 PROCEDURE — 85025 COMPLETE CBC W/AUTO DIFF WBC: CPT

## 2022-09-29 PROCEDURE — 99214 OFFICE O/P EST MOD 30 MIN: CPT | Performed by: FAMILY MEDICINE

## 2022-09-29 PROCEDURE — 80053 COMPREHEN METABOLIC PANEL: CPT

## 2022-09-29 PROCEDURE — 84443 ASSAY THYROID STIM HORMONE: CPT

## 2022-09-29 PROCEDURE — 36415 COLL VENOUS BLD VENIPUNCTURE: CPT

## 2022-09-29 PROCEDURE — 83970 ASSAY OF PARATHORMONE: CPT

## 2022-09-29 ASSESSMENT — FIBROSIS 4 INDEX: FIB4 SCORE: 2.36

## 2022-09-29 NOTE — PROGRESS NOTES
Chief Complaint:   Chief Complaint   Patient presents with    Arthritis    Fatigue       HPI:Established patient   Alice Whitlock is a 77 y.o. female who presents for follow-up, discussed the following today:     Leg cramping /Left leg swelling  New concern  Reports having these intermittent occasional leg cramps especially at night when she lies down.  That continued for few weeks, and now resolved but she had also associated left leg swelling and edema, that was associated with a cramping.  She went vacation and came back she had a lot of traveling by plane, after she came back she continued to take muscle relaxant and hydration, and that resolved the cramping at this time.  But she kept feeling pain on the area where she had the cramping on the side of the leg and the lower leg and the foot.    Denies symptoms like shortness of breath or chest pain or discomfort or palpitations, she said the swelling has improved a lot at this time.    Other fatigue  Reports a lot of fatigue and tiredness for the past few weeks, she had her COVID-vaccine also on September 20.  But she said her fatigue is chronic and now it is getting worse and she feels more tired and the need to sleep for longer hours is what is making her feel that she is more fatigued.    Pain of right thumb  Chronic, ongoing.  Pain and discomfort and inability to use the right thumb correctly related to arthritis.  Would like it to be further evaluated.    5. Bilateral impacted cerumen  Patient reported a lot of dizzy spells associated with ear discomfort.  During exam both ear canals are impacted with I found that her ears are impacted with wax, especially the 1 in the right.          Past medical history, family history, social history and medications reviewed and updated in the record. Today   Current medications, problem list and allergies reviewed in [facility] today   Health maintenance topics are reviewed and updated.    Patient Active Problem List     Diagnosis Date Noted    Pain of right thumb 09/29/2022    Arthralgia of back 01/20/2022    History of uterine cancer 01/06/2022    Thyroid dysfunction 01/06/2022    Encounter to establish care with new doctor 01/06/2022    Neuropathic pain, leg, left 10/02/2018    Primary localized osteoarthrosis of the hip 09/21/2018    Osteoporosis, post-menopausal 09/10/2018    Post-surgical hypothyroidism 09/10/2018    LBBB (left bundle branch block) 10/21/2016    Left knee pain 10/11/2016    FH: CAD (coronary artery disease) 08/19/2015    GERD (gastroesophageal reflux disease) 07/09/2014    Hyperlipidemia 07/09/2014    Mixed stress and urge urinary incontinence 07/09/2014    Chronic depression 07/08/2013    History of total knee arthroplasty 07/08/2013    Total knee replacement status, left 07/08/2013    OA (osteoarthritis) of knee 11/06/2012     Family History   Problem Relation Age of Onset    Heart Disease Other     Cancer Other     Hypertension Other     Cancer Maternal Aunt         breast    Heart Disease Mother      Social History     Socioeconomic History    Marital status:      Spouse name: Not on file    Number of children: Not on file    Years of education: Not on file    Highest education level: Not on file   Occupational History    Not on file   Tobacco Use    Smoking status: Never    Smokeless tobacco: Never   Vaping Use    Vaping Use: Never used   Substance and Sexual Activity    Alcohol use: Yes     Comment: 2 a month    Drug use: No    Sexual activity: Yes     Partners: Male     Comment:     Other Topics Concern    Not on file   Social History Narrative    Not on file     Social Determinants of Health     Financial Resource Strain: Not on file   Food Insecurity: Not on file   Transportation Needs: Not on file   Physical Activity: Not on file   Stress: Not on file   Social Connections: Not on file   Intimate Partner Violence: Not on file   Housing Stability: Not on file     Current Outpatient  "Medications   Medication Sig Dispense Refill    diclofenac sodium (VOLTAREN) 1 % Gel       cyclobenzaprine (FLEXERIL) 5 mg tablet Take 1 Tablet by mouth 1 time a day as needed for Moderate Pain or Muscle Spasms. 20 Tablet 1    meloxicam (MOBIC) 15 MG tablet Take 1 Tablet by mouth every day. 30 Tablet 1    gabapentin (NEURONTIN) 300 MG Cap Take 1 Capsule by mouth 2 times a day. 180 Capsule 1    levothyroxine (SYNTHROID) 88 MCG Tab Take 1 Tablet by mouth every day. 90 Tablet 3    sertraline (ZOLOFT) 100 MG Tab Take 1 Tablet by mouth every day. 90 Tablet 3    rosuvastatin (CRESTOR) 10 MG Tab Take 1 Tablet by mouth every evening. 90 Tablet 3    alendronate (FOSAMAX) 70 MG Tab TAKE 1 TABLET BY MOUTH ONCE A WEEK IN THE MORNING WITH A FULL GLASS OF WATER, 30 MINUTES BEFORE THE FIRST MEAL, BEVERAGE OR MEDICATION OF THE DAY. REMAIN UPRIGHT 4 Tablet 6    ascorbic acid (VITAMIN C) 1000 MG tablet Take  by mouth.      vitamin E (VITAMIN E) 1000 Unit (450 mg) Cap Take 1,000 Units by mouth every day.      Fluticasone Propionate (FLONASE NA) Administer  into affected nostril(S).      Cyanocobalamin (VITAMIN B-12 PO) Take 1 Tab by mouth every day.      vitamin D, Ergocalciferol, (DRISDOL) 22574 UNITS Cap capsule Take 50,000 Units by mouth every 7 days.       No current facility-administered medications for this visit.           Review Of Systems  As documented in HPI above  PHYSICAL EXAMINATION:    /74 (BP Location: Left arm, Patient Position: Sitting, BP Cuff Size: Adult)   Pulse 82   Temp 36.1 °C (97 °F) (Temporal)   Resp 16   Ht 1.6 m (5' 3\")   Wt 64.9 kg (143 lb)   SpO2 96%   BMI 25.33 kg/m²   Gen.: Well-developed, well-nourished, no apparent distress, pleasant and cooperative with the examination  HEENT: Normocephalic/atraumatic, sinuses nontender with palpation, excessive wax, especially the 1 in the right , nares patent with pink mucosa and clear rhinorrhea, oropharynx clear  Neck: No JVD or bruits, no " adenopathy  Cor: Regular rate and rhythm without murmur gallop or rub  Lungs: Clear to auscultation with equal breath sounds bilaterally. No wheezes, rhonchi.  Abdomen: Soft nontender without hepatosplenomegaly or masses appreciated, normoactive bowel sounds  Extremities: No cyanosis, clubbing or edema        ASSESSMENT/Plan:  1. Leg cramping  New concern, associated with left leg swelling, advised to do an ultrasound and labs to rule out electrolyte disturbance and abnormal calcium metabolism along with an ultrasound to rule out blood clot especially that the patient had history of traveling recently and the cramping and pain was associated with left leg swelling.  And follow-up as directed, no red flags at this time  Comp Metabolic Panel    CALCIUM IONIZED    PTH WITH CALCIUM      2. Left leg swelling  New concern, rule out DVT.  Patient to follow-up as directed    US-EXTREMITY VENOUS LOWER UNILAT LEFT      3. Other fatigue  New concern, worsening of her fatigue.  Differential diagnosis can include her recent COVID-vaccine, but will do further work-up to rule out other concerns like thyroid.  Her anemia  CBC WITH DIFFERENTIAL    TSH WITH REFLEX TO FT4      4. Pain of right thumb  Chronic ongoing, most likely related to osteoarthritis of the right thumb, advised to follow-up with orthopedics for thumb injection with steroids.  Referral to Orthopedics      5. Bilateral impacted cerumen  Patient to use Debrox eardrops 6 drops in each ear twice a day, and come back for possible ear lavage.          Please note that this dictation was created using voice recognition software. I have made every reasonable attempt to correct obvious errors but there may be errors of grammar and content that I may have overlooked prior to finalization of this note.

## 2022-09-30 DIAGNOSIS — E21.3 HYPERPARATHYROIDISM (HCC): ICD-10-CM

## 2022-10-11 ENCOUNTER — OFFICE VISIT (OUTPATIENT)
Dept: MEDICAL GROUP | Facility: LAB | Age: 77
End: 2022-10-11
Payer: MEDICARE

## 2022-10-11 VITALS
BODY MASS INDEX: 25.34 KG/M2 | WEIGHT: 143 LBS | OXYGEN SATURATION: 97 % | RESPIRATION RATE: 16 BRPM | TEMPERATURE: 97.2 F | SYSTOLIC BLOOD PRESSURE: 112 MMHG | DIASTOLIC BLOOD PRESSURE: 70 MMHG | HEART RATE: 82 BPM | HEIGHT: 63 IN

## 2022-10-11 DIAGNOSIS — H61.23 EXCESSIVE EAR WAX, BILATERAL: ICD-10-CM

## 2022-10-11 DIAGNOSIS — M81.0 OSTEOPOROSIS, UNSPECIFIED OSTEOPOROSIS TYPE, UNSPECIFIED PATHOLOGICAL FRACTURE PRESENCE: ICD-10-CM

## 2022-10-11 DIAGNOSIS — E21.3 HYPERPARATHYROIDISM (HCC): ICD-10-CM

## 2022-10-11 DIAGNOSIS — M54.9 ARTHRALGIA OF BACK: ICD-10-CM

## 2022-10-11 DIAGNOSIS — R25.2 LEG CRAMPING: ICD-10-CM

## 2022-10-11 PROCEDURE — 99214 OFFICE O/P EST MOD 30 MIN: CPT | Performed by: FAMILY MEDICINE

## 2022-10-11 ASSESSMENT — FIBROSIS 4 INDEX: FIB4 SCORE: 1.77

## 2022-10-11 NOTE — PROGRESS NOTES
Chief Complaint:   Chief Complaint   Patient presents with    Cerumen Impaction       HPI:Established patient   Alice Whitlock is a 77 y.o. female who presents for follow-up, discussed the following today:    1. Hyperparathyroidism  Reviewed lab work results which shows evidence of elevated parathyroid hormone hormone and elevated ionized calcium.  Patient reports chronic leg cramping.  Discussed with the patient to follow-up with endocrinology as directed she has a referral already in the system  2. Leg cramping  Continues, patient think it is a chronic problem and she does not think she has a blood clot so she did not schedule her ultrasound that I give her the order last visit to rule out blood clot because she is having cramping and pain on the left lower extremity.  Directed to schedule that appointment to rule out other causes, discussed with the patient most likely is related to her abnormal calcium metabolism    3. Arthralgia of back  Chronic, ongoing with arthritis in different parts of her body, would like to continue with meloxicam.  Discussed side effects from the medication long-term use.  Patient to continue Tylenol analgesia and muscle relaxant as needed and only if the pain is not well controlled and severe then she can go for meloxicam.    4. Excessive ear wax, bilateral  Debrox in both ears for the past 10 days, here for ear lavage today has been using    5. Osteoporosis, unspecified osteoporosis type, unspecified pathological fracture presence    Patient on Fosamax, discussed importance of taking medication as directed, no recent fracture.  Denies concerns          Past medical history, family history, social history and medications reviewed and updated in the record.   Current medications, problem list and allergies reviewed in [facility]  Health maintenance topics are reviewed and updated.    Patient Active Problem List    Diagnosis Date Noted    Pain of right thumb 09/29/2022    Arthralgia of back  01/20/2022    History of uterine cancer 01/06/2022    Thyroid dysfunction 01/06/2022    Encounter to establish care with new doctor 01/06/2022    Neuropathic pain, leg, left 10/02/2018    Primary localized osteoarthrosis of the hip 09/21/2018    Osteoporosis, post-menopausal 09/10/2018    Post-surgical hypothyroidism 09/10/2018    LBBB (left bundle branch block) 10/21/2016    Left knee pain 10/11/2016    FH: CAD (coronary artery disease) 08/19/2015    GERD (gastroesophageal reflux disease) 07/09/2014    Hyperlipidemia 07/09/2014    Mixed stress and urge urinary incontinence 07/09/2014    Chronic depression 07/08/2013    History of total knee arthroplasty 07/08/2013    Total knee replacement status, left 07/08/2013    OA (osteoarthritis) of knee 11/06/2012     Family History   Problem Relation Age of Onset    Heart Disease Other     Cancer Other     Hypertension Other     Cancer Maternal Aunt         breast    Heart Disease Mother      Social History     Socioeconomic History    Marital status:      Spouse name: Not on file    Number of children: Not on file    Years of education: Not on file    Highest education level: Not on file   Occupational History    Not on file   Tobacco Use    Smoking status: Never    Smokeless tobacco: Never   Vaping Use    Vaping Use: Never used   Substance and Sexual Activity    Alcohol use: Yes     Comment: 2 a month    Drug use: No    Sexual activity: Yes     Partners: Male     Comment:     Other Topics Concern    Not on file   Social History Narrative    Not on file     Social Determinants of Health     Financial Resource Strain: Not on file   Food Insecurity: Not on file   Transportation Needs: Not on file   Physical Activity: Not on file   Stress: Not on file   Social Connections: Not on file   Intimate Partner Violence: Not on file   Housing Stability: Not on file     Current Outpatient Medications   Medication Sig Dispense Refill    cyclobenzaprine (FLEXERIL) 5 mg  "tablet TAKE 1 TABLET BY MOUTH ONCE DAILY AS NEEDED FOR MODERATE PAIN OR MUSCLE SPASM 20 Tablet 0    meloxicam (MOBIC) 15 MG tablet Take 1 tablet by mouth once daily 30 Tablet 0    diclofenac sodium (VOLTAREN) 1 % Gel       gabapentin (NEURONTIN) 300 MG Cap Take 1 Capsule by mouth 2 times a day. 180 Capsule 1    levothyroxine (SYNTHROID) 88 MCG Tab Take 1 Tablet by mouth every day. 90 Tablet 3    sertraline (ZOLOFT) 100 MG Tab Take 1 Tablet by mouth every day. 90 Tablet 3    rosuvastatin (CRESTOR) 10 MG Tab Take 1 Tablet by mouth every evening. 90 Tablet 3    alendronate (FOSAMAX) 70 MG Tab TAKE 1 TABLET BY MOUTH ONCE A WEEK IN THE MORNING WITH A FULL GLASS OF WATER, 30 MINUTES BEFORE THE FIRST MEAL, BEVERAGE OR MEDICATION OF THE DAY. REMAIN UPRIGHT 4 Tablet 6    ascorbic acid (VITAMIN C) 1000 MG tablet Take  by mouth.      vitamin E (VITAMIN E) 1000 Unit (450 mg) Cap Take 1,000 Units by mouth every day.      Fluticasone Propionate (FLONASE NA) Administer  into affected nostril(S).      Cyanocobalamin (VITAMIN B-12 PO) Take 1 Tab by mouth every day.      vitamin D, Ergocalciferol, (DRISDOL) 95266 UNITS Cap capsule Take 50,000 Units by mouth every 7 days.       No current facility-administered medications for this visit.          Review Of Systems  As documented in HPI above  PHYSICAL EXAMINATION:    /70 (BP Location: Right arm, Patient Position: Sitting, BP Cuff Size: Adult)   Pulse 82   Temp 36.2 °C (97.2 °F) (Temporal)   Resp 16   Ht 1.6 m (5' 3\")   Wt 64.9 kg (143 lb)   SpO2 97%   BMI 25.33 kg/m²   Gen.: Well-developed, well-nourished, no apparent distress, pleasant and cooperative with the examination  HEENT: Normocephalic/atraumatic, sinuses nontender with palpation, tympanic membranes is not visualized, both ear canals and completely packed with wax/cerumen, nares patent with pink mucosa and clear rhinorrhea, oropharynx clear  Neck: No JVD or bruits, no adenopathy  Cor: Regular rate and rhythm " without murmur gallop or rub  Lungs: Clear to auscultation with equal breath sounds bilaterally. No wheezes, rhonchi.      Extremities: No cyanosis, clubbing or edema        ASSESSMENT/Plan:  1. Hyperparathyroidism (HCC)  New concern, follow-up with endocrinology, patient to make sure to take her osteoporosis medication as directed and follow-up as directed      2. Leg cramping  Chronic, ongoing.  Push fluids and hydration, most likely related to abnormal calcium metabolism.  If it is getting worse or persist patient to schedule appointment for ultrasound to rule out DVT      3. Arthralgia of back  Chronic, ongoing, advised not to use NSAIDs for long-term, patient analgesia with Tylenol and muscle relaxant as needed only.  And to reserve NSAIDs for uncontrolled pain      4. Excessive ear wax, bilateral  Patient is here for ear lavage, procedures done uneventfully, patient symptoms improved      5. Osteoporosis, unspecified osteoporosis type, unspecified pathological fracture presence  Chronic, continue Fosamax as directed.          Please note that this dictation was created using voice recognition software. I have made every reasonable attempt to correct obvious errors but there may be errors of grammar and content that I may have overlooked prior to finalization of this note.

## 2022-10-24 NOTE — TELEPHONE ENCOUNTER
Received request via: Pharmacy    Was the patient seen in the last year in this department? Yes  10/11/22  Does the patient have an active prescription (recently filled or refills available) for medication(s) requested? No

## 2022-10-25 RX ORDER — CYCLOBENZAPRINE HCL 5 MG
TABLET ORAL
Qty: 20 TABLET | Refills: 0 | Status: SHIPPED | OUTPATIENT
Start: 2022-10-25 | End: 2022-11-15

## 2022-10-27 ENCOUNTER — APPOINTMENT (OUTPATIENT)
Dept: MEDICAL GROUP | Facility: LAB | Age: 77
End: 2022-10-27
Payer: COMMERCIAL

## 2022-11-09 ENCOUNTER — PATIENT MESSAGE (OUTPATIENT)
Dept: HEALTH INFORMATION MANAGEMENT | Facility: OTHER | Age: 77
End: 2022-11-09

## 2022-11-14 NOTE — TELEPHONE ENCOUNTER
Received request via: Pharmacy    Was the patient seen in the last year in this department? Yes  10/11/22  Does the patient have an active prescription (recently filled or refills available) for medication(s) requested? No    Does the patient have detention Plus and need 100 day supply (blood pressure, diabetes and cholesterol meds only)?

## 2022-11-15 RX ORDER — MELOXICAM 15 MG/1
TABLET ORAL
Qty: 30 TABLET | Refills: 0 | Status: SHIPPED | OUTPATIENT
Start: 2022-11-15 | End: 2022-12-06

## 2022-11-15 RX ORDER — CYCLOBENZAPRINE HCL 5 MG
TABLET ORAL
Qty: 20 TABLET | Refills: 0 | Status: SHIPPED | OUTPATIENT
Start: 2022-11-15 | End: 2022-12-06

## 2022-12-06 RX ORDER — CYCLOBENZAPRINE HCL 5 MG
TABLET ORAL
Qty: 20 TABLET | Refills: 0 | Status: SHIPPED | OUTPATIENT
Start: 2022-12-06 | End: 2023-01-05

## 2022-12-06 RX ORDER — MELOXICAM 15 MG/1
TABLET ORAL
Qty: 30 TABLET | Refills: 0 | Status: SHIPPED | OUTPATIENT
Start: 2022-12-06 | End: 2023-01-24

## 2022-12-06 NOTE — TELEPHONE ENCOUNTER
Received request via: Pharmacy    Was the patient seen in the last year in this department? Yes 10/11/2022    Does the patient have an active prescription (recently filled or refills available) for medication(s) requested? No    Does the patient have FPC Plus and need 100 day supply (blood pressure, diabetes and cholesterol meds only)? Patient does not have SCP

## 2023-01-05 RX ORDER — CYCLOBENZAPRINE HCL 5 MG
TABLET ORAL
Qty: 20 TABLET | Refills: 0 | Status: SHIPPED | OUTPATIENT
Start: 2023-01-05 | End: 2023-01-24

## 2023-01-23 NOTE — TELEPHONE ENCOUNTER
Received request via: Pharmacy    Was the patient seen in the last year in this department? Yes  10/11/22  Does the patient have an active prescription (recently filled or refills available) for medication(s) requested? No    Does the patient have correction Plus and need 100 day supply (blood pressure, diabetes and cholesterol meds only)? Patient does not have SCP

## 2023-01-24 RX ORDER — CYCLOBENZAPRINE HCL 5 MG
TABLET ORAL
Qty: 20 TABLET | Refills: 0 | Status: SHIPPED | OUTPATIENT
Start: 2023-01-24 | End: 2023-02-24

## 2023-01-24 RX ORDER — MELOXICAM 15 MG/1
TABLET ORAL
Qty: 30 TABLET | Refills: 0 | Status: SHIPPED | OUTPATIENT
Start: 2023-01-24 | End: 2023-02-23

## 2023-01-31 ENCOUNTER — OFFICE VISIT (OUTPATIENT)
Dept: ENDOCRINOLOGY | Facility: MEDICAL CENTER | Age: 78
End: 2023-01-31
Payer: MEDICARE

## 2023-01-31 VITALS
HEIGHT: 63 IN | WEIGHT: 152 LBS | DIASTOLIC BLOOD PRESSURE: 59 MMHG | BODY MASS INDEX: 26.93 KG/M2 | OXYGEN SATURATION: 100 % | SYSTOLIC BLOOD PRESSURE: 124 MMHG | HEART RATE: 78 BPM

## 2023-01-31 DIAGNOSIS — M81.0 POSTMENOPAUSAL OSTEOPOROSIS: ICD-10-CM

## 2023-01-31 DIAGNOSIS — E03.9 HYPOTHYROIDISM, ACQUIRED: ICD-10-CM

## 2023-01-31 DIAGNOSIS — E21.3 HYPERPARATHYROIDISM (HCC): ICD-10-CM

## 2023-01-31 DIAGNOSIS — E55.9 VITAMIN D DEFICIENCY: ICD-10-CM

## 2023-01-31 PROCEDURE — 99204 OFFICE O/P NEW MOD 45 MIN: CPT

## 2023-01-31 PROCEDURE — 99211 OFF/OP EST MAY X REQ PHY/QHP: CPT

## 2023-01-31 ASSESSMENT — FIBROSIS 4 INDEX: FIB4 SCORE: 1.77

## 2023-01-31 NOTE — PROGRESS NOTES
Chief Complaint: Consult requested by Denisha Gavin M.D. for evaluation of Hypercalcemia    HPI:   Alice Whitlock is a 77 y.o. female     Hyperparathyroidism:  She was first diagnosed with hypercalcemia in September 2022.    She reports elevation of the serum calcium and parathormone, polydypsia , constipation, and osteoporosis.    She denies bone pain, history of nephrolithiasis, depression, polyuria, abdominal pain, and lethargy.    She is not currently on hydrochlorothiazide.    She is not on lithium therapy.    She  is not taking calcium supplements.    She reports history of malignancy-skin and endomitrial cancer.    She denies family history of hypercalcemia.    She denies family history of endocrine malignancies.       Latest Reference Range & Units 09/29/22 10:02   Pth, Intact 14.0 - 72.0 pg/mL 73.9 (H)      Latest Reference Range & Units 09/29/22 10:02   Calcium 8.5 - 10.5 mg/dL 10.1      Latest Reference Range & Units 09/29/22 19:21   Istat Ionized Calcium 1.10 - 1.30 mmol/L 1.47 (H)      Latest Reference Range & Units 09/29/22 10:02   TSH 0.380 - 5.330 uIU/mL 1.390     2. Hypothyroidism, acquired:  FV by primary care provider   Thyroidectomy many years ago when she was in her 20s due to thyroid nodules w/o cancer   Currently taking levothyroxine 88 mcg daily  Taking her medication-morning on an empty stomach  Reports were compliant with her medication regimen  Denies taking iron, calcium, antiacids  Denies taking biotin  Denies taking multivitamins    3.  Postmenopausal osteoporosis:  FV by PCP   Currently taking alendronate 70 mg weekly    4.  Vitamin D deficiency:  Currently taking daily-unknown dosage     Patient's medications, allergies, and social histories were reviewed and updated as appropriate.      ROS:      CONS:     No fever, no chills, no weight loss, no fatigue   EYES:      No diplopia, no blurry vision, no redness of eyes, no swelling of eyelids   ENT:    No hearing loss, No ear pain, No  sore throat, no dysphagia, no neck swelling   CV:     No chest pain, no palpitations, no claudication, no orthopnea, no PND   PULM:    No SOB, no cough, no hemoptysis, no wheezing    GI:   No nausea, no vomiting, no diarrhea, no constipation, no bloody stools   :  Passing urine well, no dysuria, no hematuria   ENDO:   No polyuria, no polydipsia, no heat intolerance, no cold intolerance   NEURO: No headaches, no dizziness, no convulsions, no tremors   MUSC:  No joint swellings, no arthralgias, no myalgias, no weakness   SKIN:   No rash, no ulcers, no dry skin   PSYCH:   No depression, no anxiety, no difficulty sleeping       Past Medical History:  Patient Active Problem List    Diagnosis Date Noted    Pain of right thumb 09/29/2022    Arthralgia of back 01/20/2022    History of uterine cancer 01/06/2022    Thyroid dysfunction 01/06/2022    Encounter to establish care with new doctor 01/06/2022    Neuropathic pain, leg, left 10/02/2018    Primary localized osteoarthrosis of the hip 09/21/2018    Osteoporosis, post-menopausal 09/10/2018    Post-surgical hypothyroidism 09/10/2018    LBBB (left bundle branch block) 10/21/2016    Left knee pain 10/11/2016    FH: CAD (coronary artery disease) 08/19/2015    GERD (gastroesophageal reflux disease) 07/09/2014    Hyperlipidemia 07/09/2014    Mixed stress and urge urinary incontinence 07/09/2014    Chronic depression 07/08/2013    History of total knee arthroplasty 07/08/2013    Total knee replacement status, left 07/08/2013    OA (osteoarthritis) of knee 11/06/2012       Past Surgical History:  Past Surgical History:   Procedure Laterality Date    HYSTERECTOMY ROBOTIC XI  1/17/2019    Procedure: HYSTERECTOMY ROBOTIC XI;  Surgeon: Shahid Paez M.D.;  Location: SURGERY [facility];  Service: Gynecology    SALPINGECTOMY Bilateral 1/17/2019    Procedure: SALPINGECTOMY;  Surgeon: Shahid Paez M.D.;  Location: SURGERY [facility];  Service: Gynecology    OOPHORECTOMY  Bilateral 1/17/2019    Procedure: OOPHORECTOMY;  Surgeon: Shahid Paez M.D.;  Location: SURGERY [facility];  Service: Gynecology    NODE BIOPSY SENTINEL  1/17/2019    Procedure: NODE BIOPSY SENTINEL;  Surgeon: Shahid Paez M.D.;  Location: SURGERY [facility];  Service: Gynecology    KNEE ARTHROPLASTY TOTAL Left 10/11/2016    Procedure: KNEE ARTHROPLASTY TOTAL;  Surgeon: Beverly Chauhan M.D.;  Location: SURGERY [facility];  Service:     KNEE ARTHROPLASTY TOTAL  11/6/2012    Performed by Beverly Chauhan M.D. at [facility]    KNEE ARTHROSCOPY  1/18/2011    Performed by MARILEE NAGEL at [facility]    MENISCECTOMY, KNEE, MEDIAL  1/18/2011    Performed by MARILEE NAGEL at [facility]    MENISCECTOMY  1/18/2011    Performed by MARILEE NAGEL at [facility]    SYNOVECTOMY  1/18/2011    Performed by MARILEE NAGEL at [facility]    BLADDER SUSPENSION  2000    THYROIDECTOMY TOTAL  1990    MAMMOPLASTY AUGMENTATION  1981    IN BREAST AUGMENTATION WITH IMPLANT  1980    OTHER SURGICAL PROCEDURE  1979    excision thyroid nodules    LAPAROTOMY  1972    endometriosis    TONSILLECTOMY  1957        Allergies:  Penicillins     Current Medications:    Current Outpatient Medications:     meloxicam (MOBIC) 15 MG tablet, Take 1 tablet by mouth once daily, Disp: 30 Tablet, Rfl: 0    cyclobenzaprine (FLEXERIL) 5 mg tablet, TAKE 1 TABLET BY MOUTH ONCE DAILY AS NEEDED FOR  MUSCLE  SPASM  OR  MODERATE  PAIN, Disp: 20 Tablet, Rfl: 0    diclofenac sodium (VOLTAREN) 1 % Gel, , Disp: , Rfl:     gabapentin (NEURONTIN) 300 MG Cap, Take 1 Capsule by mouth 2 times a day., Disp: 180 Capsule, Rfl: 1    levothyroxine (SYNTHROID) 88 MCG Tab, Take 1 Tablet by mouth every day., Disp: 90 Tablet, Rfl: 3    sertraline (ZOLOFT) 100 MG Tab, Take 1 Tablet by mouth every day., Disp: 90 Tablet, Rfl: 3    rosuvastatin (CRESTOR) 10 MG Tab, Take 1 Tablet by mouth every  evening., Disp: 90 Tablet, Rfl: 3    alendronate (FOSAMAX) 70 MG Tab, TAKE 1 TABLET BY MOUTH ONCE A WEEK IN THE MORNING WITH A FULL GLASS OF WATER, 30 MINUTES BEFORE THE FIRST MEAL, BEVERAGE OR MEDICATION OF THE DAY. REMAIN UPRIGHT, Disp: 4 Tablet, Rfl: 6    ascorbic acid (VITAMIN C) 1000 MG tablet, Take  by mouth., Disp: , Rfl:     vitamin E (VITAMIN E) 1000 Unit (450 mg) Cap, Take 1,000 Units by mouth every day., Disp: , Rfl:     Fluticasone Propionate (FLONASE NA), Administer  into affected nostril(S)., Disp: , Rfl:     Cyanocobalamin (VITAMIN B-12 PO), Take 1 Tab by mouth every day., Disp: , Rfl:     vitamin D, Ergocalciferol, (DRISDOL) 43023 UNITS Cap capsule, Take 50,000 Units by mouth every 7 days., Disp: , Rfl:     Social History:  Social History     Socioeconomic History    Marital status:      Spouse name: Not on file    Number of children: Not on file    Years of education: Not on file    Highest education level: Not on file   Occupational History    Not on file   Tobacco Use    Smoking status: Never    Smokeless tobacco: Never   Vaping Use    Vaping Use: Never used   Substance and Sexual Activity    Alcohol use: Yes     Comment: 2 a month    Drug use: No    Sexual activity: Yes     Partners: Male     Comment:     Other Topics Concern    Not on file   Social History Narrative    Not on file     Social Determinants of Health     Financial Resource Strain: Not on file   Food Insecurity: Not on file   Transportation Needs: Not on file   Physical Activity: Not on file   Stress: Not on file   Social Connections: Not on file   Intimate Partner Violence: Not on file   Housing Stability: Not on file        Family History:   Family History   Problem Relation Age of Onset    Heart Disease Other     Cancer Other     Hypertension Other     Cancer Maternal Aunt         breast    Heart Disease Mother          PHYSICAL EXAM:   Vital signs: /59 (BP Location: Left arm, Patient Position: Sitting, BP  "Cuff Size: Adult long)   Pulse 78   Ht 1.6 m (5' 3\")   Wt 68.9 kg (152 lb)   SpO2 100%   BMI 26.93 kg/m²   GENERAL: Well-developed, well-nourished  in no apparent distress.   EYE: No ocular and eyelid asymmetry, Anicteric sclerae,  PERRL, No exophthalmos or lidlag  HENT: Hearing grossly intact, Normocephalic, atraumatic.   NECK: Supple. Trachea midline. thyroid gland is surgically absent  CARDIOVASCULAR: Regular rate and rhythm. No murmurs, rubs, or gallops.   LUNGS: Clear to auscultation bilaterally   EXTREMITIES: No clubbing, cyanosis, or edema.   NEUROLOGICAL: Cranial nerves II-XII are grossly intact   Symmetric reflexes at the patella no proximal muscle weakness, No visible tremor with both outstretched hands  LYMPH: No cervical, supraclavicular,  adenopathy palpated.   SKIN: No rashes, lesions. Turgor is normal.      Labs:    Lab Results   Component Value Date/Time    TSHULTRASEN 1.390 09/29/2022 1002           Imaging:      ASSESSMENT/PLAN:   1. Hyperparathyroidism (HCC)  Clinically unstable  Biochemically unstable  She has a history of total thyroidectomy many years ago  We will conduct the following evaluation to determine if patient is a candidate for surgery for a parathyroid adenoma  - PTH INTACT (PTH ONLY); Future  - SPEP W/REFLEX TO CHERELLE MARIE, G, M; Future  - IONIZED CALCIUM  - PHOSPHORUS  - NM-PARATHYROID (SESTAMIBI) SCAN  - URINE CREATININE 24 HR  - Urine Calcium 24 HR or Random    2. Hypothyroidism, acquired  Stable  Follow up PCP  - TSH; Future  - FREE THYROXINE; Future    3. Postmenopausal osteoporosis  Stable  Follow-up PCP    4. Vitamin D deficiency  I will evaluate levels for diagnoses #1  - VITAMIN D,25 HYDROXY (DEFICIENCY)  - VITAMIN 1,25 DIHYDROXY (CALCIUM METABOLISM)     Disposition: Follow-up in 4 months  Do your blood work 3 weeks prior to next appointment  Schedule sestamibi scan      Thank you kindly for allowing me to participate in the endocrine care plan for this patient.    Jace CHOPRA" MATT Garza  01/31/23    CC:   Denisha Gavin M.D.

## 2023-02-02 ENCOUNTER — HOSPITAL ENCOUNTER (OUTPATIENT)
Dept: LAB | Facility: MEDICAL CENTER | Age: 78
End: 2023-02-02
Payer: MEDICARE

## 2023-02-02 DIAGNOSIS — E03.9 HYPOTHYROIDISM, ACQUIRED: ICD-10-CM

## 2023-02-02 DIAGNOSIS — E21.3 HYPERPARATHYROIDISM (HCC): ICD-10-CM

## 2023-02-02 LAB
PTH-INTACT SERPL-MCNC: 67.9 PG/ML (ref 14–72)
T4 FREE SERPL-MCNC: 1.26 NG/DL (ref 0.93–1.7)
TSH SERPL DL<=0.005 MIU/L-ACNC: 5.16 UIU/ML (ref 0.38–5.33)

## 2023-02-02 PROCEDURE — 84439 ASSAY OF FREE THYROXINE: CPT

## 2023-02-02 PROCEDURE — 36415 COLL VENOUS BLD VENIPUNCTURE: CPT

## 2023-02-02 PROCEDURE — 83970 ASSAY OF PARATHORMONE: CPT

## 2023-02-02 PROCEDURE — 84165 PROTEIN E-PHORESIS SERUM: CPT

## 2023-02-02 PROCEDURE — 84443 ASSAY THYROID STIM HORMONE: CPT

## 2023-02-02 PROCEDURE — 84155 ASSAY OF PROTEIN SERUM: CPT

## 2023-02-06 LAB
ALBUMIN SERPL ELPH-MCNC: 4.34 G/DL (ref 3.75–5.01)
ALPHA1 GLOB SERPL ELPH-MCNC: 0.26 G/DL (ref 0.19–0.46)
ALPHA2 GLOB SERPL ELPH-MCNC: 0.68 G/DL (ref 0.48–1.05)
B-GLOBULIN SERPL ELPH-MCNC: 0.74 G/DL (ref 0.48–1.1)
GAMMA GLOB SERPL ELPH-MCNC: 1.17 G/DL (ref 0.62–1.51)
INTERPRETATION SERPL IFE-IMP: NORMAL
MONOCLON BAND OBS SERPL: NORMAL
MONOCLONAL PROTEIN NL11656: NORMAL G/DL
PATHOLOGY STUDY: NORMAL
PROT SERPL-MCNC: 7.2 G/DL (ref 6.3–8.2)

## 2023-02-16 ENCOUNTER — HOSPITAL ENCOUNTER (OUTPATIENT)
Dept: RADIOLOGY | Facility: MEDICAL CENTER | Age: 78
End: 2023-02-16
Attending: FAMILY MEDICINE
Payer: MEDICARE

## 2023-02-16 DIAGNOSIS — Z12.31 ENCOUNTER FOR SCREENING MAMMOGRAM FOR MALIGNANT NEOPLASM OF BREAST: ICD-10-CM

## 2023-02-16 PROCEDURE — 77063 BREAST TOMOSYNTHESIS BI: CPT

## 2023-02-21 ENCOUNTER — HOSPITAL ENCOUNTER (OUTPATIENT)
Dept: RADIOLOGY | Facility: MEDICAL CENTER | Age: 78
End: 2023-02-21
Payer: MEDICARE

## 2023-02-21 PROCEDURE — A9500 TC99M SESTAMIBI: HCPCS

## 2023-02-23 ENCOUNTER — HOSPITAL ENCOUNTER (OUTPATIENT)
Dept: LAB | Facility: MEDICAL CENTER | Age: 78
End: 2023-02-23
Payer: MEDICARE

## 2023-02-23 DIAGNOSIS — E78.5 HYPERLIPIDEMIA, UNSPECIFIED HYPERLIPIDEMIA TYPE: ICD-10-CM

## 2023-02-23 PROCEDURE — 80048 BASIC METABOLIC PNL TOTAL CA: CPT

## 2023-02-23 PROCEDURE — 36415 COLL VENOUS BLD VENIPUNCTURE: CPT

## 2023-02-23 RX ORDER — MELOXICAM 15 MG/1
TABLET ORAL
Qty: 30 TABLET | Refills: 0 | Status: SHIPPED | OUTPATIENT
Start: 2023-02-23 | End: 2023-03-31

## 2023-02-23 RX ORDER — ROSUVASTATIN CALCIUM 10 MG/1
TABLET, COATED ORAL
Qty: 90 TABLET | Refills: 0 | Status: SHIPPED | OUTPATIENT
Start: 2023-02-23 | End: 2023-02-25 | Stop reason: SDUPTHER

## 2023-02-23 NOTE — TELEPHONE ENCOUNTER
Received request via: Pharmacy    Was the patient seen in the last year in this department? Yes  10/11/2022  Does the patient have an active prescription (recently filled or refills available) for medication(s) requested? No    Does the patient have halfway Plus and need 100 day supply (blood pressure, diabetes and cholesterol meds only)? Patient does not have SCP

## 2023-02-24 LAB
ANION GAP SERPL CALC-SCNC: 8 MMOL/L (ref 7–16)
BUN SERPL-MCNC: 16 MG/DL (ref 8–22)
CALCIUM SERPL-MCNC: 10.2 MG/DL (ref 8.5–10.5)
CHLORIDE SERPL-SCNC: 101 MMOL/L (ref 96–112)
CO2 SERPL-SCNC: 25 MMOL/L (ref 20–33)
CREAT SERPL-MCNC: 0.64 MG/DL (ref 0.5–1.4)
GFR SERPLBLD CREATININE-BSD FMLA CKD-EPI: 91 ML/MIN/1.73 M 2
GLUCOSE SERPL-MCNC: 91 MG/DL (ref 65–99)
POTASSIUM SERPL-SCNC: 4.4 MMOL/L (ref 3.6–5.5)
SODIUM SERPL-SCNC: 134 MMOL/L (ref 135–145)

## 2023-02-24 RX ORDER — CYCLOBENZAPRINE HCL 5 MG
TABLET ORAL
Qty: 20 TABLET | Refills: 0 | Status: SHIPPED | OUTPATIENT
Start: 2023-02-24 | End: 2023-03-31

## 2023-02-25 DIAGNOSIS — E78.5 HYPERLIPIDEMIA, UNSPECIFIED HYPERLIPIDEMIA TYPE: ICD-10-CM

## 2023-02-25 NOTE — TELEPHONE ENCOUNTER
Received request via: Pharmacy    Was the patient seen in the last year in this department? Yes 10/11/22    Does the patient have an active prescription (recently filled or refills available) for medication(s) requested? No    Does the patient have long-term Plus and need 100 day supply (blood pressure, diabetes and cholesterol meds only)? Medication is not for cholesterol, blood pressure or diabetes

## 2023-02-27 ENCOUNTER — HOSPITAL ENCOUNTER (OUTPATIENT)
Dept: RADIOLOGY | Facility: MEDICAL CENTER | Age: 78
End: 2023-02-27
Payer: MEDICARE

## 2023-02-27 DIAGNOSIS — C54.1 MALIGNANT NEOPLASM OF ENDOMETRIUM (HCC): ICD-10-CM

## 2023-02-27 PROCEDURE — 71260 CT THORAX DX C+: CPT

## 2023-02-27 PROCEDURE — 700117 HCHG RX CONTRAST REV CODE 255

## 2023-02-27 RX ADMIN — IOHEXOL 100 ML: 350 INJECTION, SOLUTION INTRAVENOUS at 13:10

## 2023-02-27 RX ADMIN — IOHEXOL 25 ML: 240 INJECTION, SOLUTION INTRATHECAL; INTRAVASCULAR; INTRAVENOUS; ORAL at 13:10

## 2023-02-27 NOTE — TELEPHONE ENCOUNTER
[facility] only has 5mg tabs at this time. Can you authorize 5mg 2 a day until the are resupplied.  Thanks,  Ulisses

## 2023-02-28 RX ORDER — ROSUVASTATIN CALCIUM 5 MG/1
10 TABLET, COATED ORAL
Qty: 180 TABLET | Refills: 3 | Status: SHIPPED | OUTPATIENT
Start: 2023-02-28 | End: 2023-10-06

## 2023-03-01 ENCOUNTER — TELEMEDICINE (OUTPATIENT)
Dept: ENDOCRINOLOGY | Facility: MEDICAL CENTER | Age: 78
End: 2023-03-01
Payer: MEDICARE

## 2023-03-01 DIAGNOSIS — E21.3 HYPERPARATHYROIDISM (HCC): ICD-10-CM

## 2023-03-01 DIAGNOSIS — M81.0 POSTMENOPAUSAL OSTEOPOROSIS: ICD-10-CM

## 2023-03-01 DIAGNOSIS — E03.9 HYPOTHYROIDISM, ACQUIRED: ICD-10-CM

## 2023-03-01 PROCEDURE — 99213 OFFICE O/P EST LOW 20 MIN: CPT | Mod: 95

## 2023-03-01 NOTE — PROGRESS NOTES
Chief Complaint: Consult requested by Denisha Gavin M.D. for evaluation of Hypercalcemia  Patient was presented for a telehealth consultation via secure and encrypted videoconferencing technology. This encounter was conducted via Zoom . Verbal consent was obtained. Patient's identity was verified.   HPI:   Alice Whitlock is a 77 y.o. female     Hyperparathyroidism:  She was first diagnosed with hypercalcemia in September 2022.    She reports elevation of the serum calcium and parathormone, polydypsia , constipation, and osteoporosis.    She denies bone pain, history of nephrolithiasis, depression, polyuria, abdominal pain, and lethargy.    She is not currently on hydrochlorothiazide.    She is not on lithium therapy.    She  is not taking calcium supplements.    She reports history of malignancy-skin and endomitrial cancer.    She denies family history of hypercalcemia.    She denies family history of endocrine malignancies.     Latest Reference Range & Units 02/02/23 09:02   Pth, Intact 14.0 - 72.0 pg/mL 67.9      Latest Reference Range & Units 02/23/23 16:11   Calcium 8.5 - 10.5 mg/dL 10.2     Sestamibi scan done on 01/31/2023 showed no parathyroid adenoma     2. Hypothyroidism, acquired:  FV by primary care provider -thyroidectomy due to nodules   Thyroidectomy many years ago when she was in her 20s due to thyroid nodules w/o cancer   Currently taking levothyroxine 88 mcg daily  Taking her medication-morning on an empty stomach  Reports were compliant with her medication regimen  Denies taking iron, calcium, antiacids  Denies taking biotin  Denies taking multivitamins     Latest Reference Range & Units 02/02/23 09:02   TSH 0.380 - 5.330 uIU/mL 5.160   Free T-4 0.93 - 1.70 ng/dL 1.26     3.  Postmenopausal osteoporosis:  FV by PCP   Currently taking alendronate 70 mg weekly  Interpretation  SPEP W/REFLEX TO CHERELLE MARIE G, M  Collected: 02/02/23 0902   Result status: Final   Resulting lab: [facility]   Value: See Note    Comment: Normal SPEP pattern.   *Additional information available - comment     [facility] - [facility]  [id number]  [place] [id number]         Patient's medications, allergies, and social histories were reviewed and updated as appropriate.      ROS:      CONS:     No fever, no chills, no weight loss, no fatigue   EYES:      No diplopia, no blurry vision, no redness of eyes, no swelling of eyelids   ENT:    No hearing loss, No ear pain, No sore throat, no dysphagia, no neck swelling   CV:     No chest pain, no palpitations, no claudication, no orthopnea, no PND   PULM:    No SOB, no cough, no hemoptysis, no wheezing    GI:   No nausea, no vomiting, no diarrhea, no constipation, no bloody stools   :  Passing urine well, no dysuria, no hematuria   ENDO:   No polyuria, no polydipsia, no heat intolerance, no cold intolerance   NEURO: No headaches, no dizziness, no convulsions, no tremors   MUSC:  No joint swellings, no arthralgias, no myalgias, no weakness   SKIN:   No rash, no ulcers, no dry skin   PSYCH:   No depression, no anxiety, no difficulty sleeping       Past Medical History:  Patient Active Problem List    Diagnosis Date Noted    Pain of right thumb 09/29/2022    Arthralgia of back 01/20/2022    History of uterine cancer 01/06/2022    Thyroid dysfunction 01/06/2022    Encounter to establish care with new doctor 01/06/2022    Neuropathic pain, leg, left 10/02/2018    Primary localized osteoarthrosis of the hip 09/21/2018    Osteoporosis, post-menopausal 09/10/2018    Post-surgical hypothyroidism 09/10/2018    LBBB (left bundle branch block) 10/21/2016    Left knee pain 10/11/2016    FH: CAD (coronary artery disease) 08/19/2015    GERD (gastroesophageal reflux disease) 07/09/2014    Hyperlipidemia 07/09/2014    Mixed stress and urge urinary incontinence 07/09/2014    Chronic depression 07/08/2013    History of total knee arthroplasty 07/08/2013    Total knee replacement status, left 07/08/2013    OA  (osteoarthritis) of knee 11/06/2012       Past Surgical History:  Past Surgical History:   Procedure Laterality Date    HYSTERECTOMY ROBOTIC XI  1/17/2019    Procedure: HYSTERECTOMY ROBOTIC XI;  Surgeon: Shahid Paez M.D.;  Location: SURGERY [facility];  Service: Gynecology    SALPINGECTOMY Bilateral 1/17/2019    Procedure: SALPINGECTOMY;  Surgeon: Shahid Paez M.D.;  Location: SURGERY [facility];  Service: Gynecology    OOPHORECTOMY Bilateral 1/17/2019    Procedure: OOPHORECTOMY;  Surgeon: Shahid Paez M.D.;  Location: SURGERY [facility];  Service: Gynecology    NODE BIOPSY SENTINEL  1/17/2019    Procedure: NODE BIOPSY SENTINEL;  Surgeon: Shahid Paez M.D.;  Location: SURGERY [facility];  Service: Gynecology    KNEE ARTHROPLASTY TOTAL Left 10/11/2016    Procedure: KNEE ARTHROPLASTY TOTAL;  Surgeon: Beverly Chauhan M.D.;  Location: [facility];  Service:     KNEE ARTHROPLASTY TOTAL  11/6/2012    Performed by Beverly Chauhan M.D. at [facility]    KNEE ARTHROSCOPY  1/18/2011    Performed by MARILEE NAGEL at [facility]    MENISCECTOMY, KNEE, MEDIAL  1/18/2011    Performed by MARILEE NAGEL at [facility]    MENISCECTOMY  1/18/2011    Performed by MARILEE NAGEL at [facility]    SYNOVECTOMY  1/18/2011    Performed by MARILEE NAGEL at [facility]    BLADDER SUSPENSION  2000    THYROIDECTOMY TOTAL  1990    MAMMOPLASTY AUGMENTATION  1981    UT BREAST AUGMENTATION WITH IMPLANT  1980    OTHER SURGICAL PROCEDURE  1979    excision thyroid nodules    LAPAROTOMY  1972    endometriosis    TONSILLECTOMY  1957        Allergies:  Penicillins     Current Medications:    Current Outpatient Medications:     rosuvastatin (CRESTOR) 5 MG Tab, Take 2 Tablets by mouth 2 times a day., Disp: 180 Tablet, Rfl: 3    cyclobenzaprine (FLEXERIL) 5 mg tablet, TAKE 1 TABLET BY MOUTH ONCE DAILY AS NEEDED FOR MUSCLE SPASM OR  MODERATE  PAIN,  Disp: 20 Tablet, Rfl: 0    meloxicam (MOBIC) 15 MG tablet, Take 1 tablet by mouth once daily, Disp: 30 Tablet, Rfl: 0    diclofenac sodium (VOLTAREN) 1 % Gel, , Disp: , Rfl:     gabapentin (NEURONTIN) 300 MG Cap, Take 1 Capsule by mouth 2 times a day., Disp: 180 Capsule, Rfl: 1    levothyroxine (SYNTHROID) 88 MCG Tab, Take 1 Tablet by mouth every day., Disp: 90 Tablet, Rfl: 3    sertraline (ZOLOFT) 100 MG Tab, Take 1 Tablet by mouth every day., Disp: 90 Tablet, Rfl: 3    alendronate (FOSAMAX) 70 MG Tab, TAKE 1 TABLET BY MOUTH ONCE A WEEK IN THE MORNING WITH A FULL GLASS OF WATER, 30 MINUTES BEFORE THE FIRST MEAL, BEVERAGE OR MEDICATION OF THE DAY. REMAIN UPRIGHT, Disp: 4 Tablet, Rfl: 6    ascorbic acid (VITAMIN C) 1000 MG tablet, Take  by mouth., Disp: , Rfl:     vitamin E (VITAMIN E) 1000 Unit (450 mg) Cap, Take 1,000 Units by mouth every day., Disp: , Rfl:     Fluticasone Propionate (FLONASE NA), Administer  into affected nostril(S)., Disp: , Rfl:     Cyanocobalamin (VITAMIN B-12 PO), Take 1 Tab by mouth every day., Disp: , Rfl:     vitamin D, Ergocalciferol, (DRISDOL) 00971 UNITS Cap capsule, Take 50,000 Units by mouth every 7 days., Disp: , Rfl:     Social History:  Social History     Socioeconomic History    Marital status:      Spouse name: Not on file    Number of children: Not on file    Years of education: Not on file    Highest education level: Not on file   Occupational History    Not on file   Tobacco Use    Smoking status: Never    Smokeless tobacco: Never   Vaping Use    Vaping Use: Never used   Substance and Sexual Activity    Alcohol use: Yes     Comment: 2 a month    Drug use: No    Sexual activity: Yes     Partners: Male     Comment:     Other Topics Concern    Not on file   Social History Narrative    Not on file     Social Determinants of Health     Financial Resource Strain: Not on file   Food Insecurity: Not on file   Transportation Needs: Not on file   Physical Activity: Not on  file   Stress: Not on file   Social Connections: Not on file   Intimate Partner Violence: Not on file   Housing Stability: Not on file        Family History:   Family History   Problem Relation Age of Onset    Heart Disease Other     Cancer Other     Hypertension Other     Cancer Maternal Aunt         breast    Heart Disease Mother          PHYSICAL EXAM:   Vital signs: virtual visit   GENERAL: Well-developed, well-nourished  in no apparent distress.     Labs:    Lab Results   Component Value Date/Time    TSHULTRASEN 1.390 09/29/2022 1002           Imaging:      ASSESSMENT/PLAN:   1. Hyperparathyroidism (HCC)  Resolved    2. Hypothyroidism, acquired  Follow-up PCP    3. Postmenopausal osteoporosis  Follow-up PCP    Disposition: Transfer care to primary care provider  Return to endocrinology if your conditions worsen or you PTH and calcium levels are elevated again    Thank you kindly for allowing me to participate in the endocrine care plan for this patient.    YEVGENIY DevlinRChantelN.  01/31/23    CC:   Denisha Gavin M.D.

## 2023-03-30 NOTE — TELEPHONE ENCOUNTER
Received request via: Pharmacy    Was the patient seen in the last year in this department? Yes 03/01/23    Does the patient have an active prescription (recently filled or refills available) for medication(s) requested? No    Does the patient have shelter Plus and need 100 day supply (blood pressure, diabetes and cholesterol meds only)? Patient does not have SCP  Meloxicam 15 MG Oral Tablet  Cyclobenzaprine HCl 5 MG Oral Tablet

## 2023-03-31 RX ORDER — CYCLOBENZAPRINE HCL 5 MG
TABLET ORAL
Qty: 20 TABLET | Refills: 0 | Status: SHIPPED | OUTPATIENT
Start: 2023-03-31 | End: 2023-04-19 | Stop reason: SDUPTHER

## 2023-03-31 RX ORDER — ALENDRONATE SODIUM 70 MG/1
TABLET ORAL
Qty: 4 TABLET | Refills: 6 | Status: SHIPPED | OUTPATIENT
Start: 2023-03-31 | End: 2024-03-21 | Stop reason: SDUPTHER

## 2023-03-31 RX ORDER — MELOXICAM 15 MG/1
TABLET ORAL
Qty: 30 TABLET | Refills: 0 | Status: SHIPPED | OUTPATIENT
Start: 2023-03-31 | End: 2023-04-19 | Stop reason: SDUPTHER

## 2023-03-31 NOTE — TELEPHONE ENCOUNTER
Received request via: Pharmacy    Was the patient seen in the last year in this department? Yes  10/11/22  Does the patient have an active prescription (recently filled or refills available) for medication(s) requested? No    Does the patient have long term Plus and need 100 day supply (blood pressure, diabetes and cholesterol meds only)? Medication is not for cholesterol, blood pressure or diabetes

## 2023-04-14 PROBLEM — E21.3 HYPERPARATHYROIDISM (HCC): Status: ACTIVE | Noted: 2023-04-14

## 2023-04-19 ENCOUNTER — OFFICE VISIT (OUTPATIENT)
Dept: MEDICAL GROUP | Facility: LAB | Age: 78
End: 2023-04-19
Payer: MEDICARE

## 2023-04-19 VITALS
BODY MASS INDEX: 26.05 KG/M2 | SYSTOLIC BLOOD PRESSURE: 130 MMHG | OXYGEN SATURATION: 94 % | HEART RATE: 76 BPM | TEMPERATURE: 98.2 F | WEIGHT: 147 LBS | HEIGHT: 63 IN | RESPIRATION RATE: 16 BRPM | DIASTOLIC BLOOD PRESSURE: 70 MMHG

## 2023-04-19 DIAGNOSIS — G89.29 CHRONIC RIGHT-SIDED LOW BACK PAIN WITH BILATERAL SCIATICA: ICD-10-CM

## 2023-04-19 DIAGNOSIS — I70.0 ATHEROSCLEROSIS OF AORTA (HCC): ICD-10-CM

## 2023-04-19 DIAGNOSIS — M54.41 CHRONIC RIGHT-SIDED LOW BACK PAIN WITH BILATERAL SCIATICA: ICD-10-CM

## 2023-04-19 DIAGNOSIS — M25.551 PAIN OF RIGHT HIP: ICD-10-CM

## 2023-04-19 DIAGNOSIS — M54.42 CHRONIC RIGHT-SIDED LOW BACK PAIN WITH BILATERAL SCIATICA: ICD-10-CM

## 2023-04-19 PROCEDURE — 99214 OFFICE O/P EST MOD 30 MIN: CPT | Performed by: FAMILY MEDICINE

## 2023-04-19 RX ORDER — CYCLOBENZAPRINE HCL 5 MG
TABLET ORAL
Qty: 30 TABLET | Refills: 0 | Status: SHIPPED | OUTPATIENT
Start: 2023-04-19 | End: 2023-06-01

## 2023-04-19 RX ORDER — MELOXICAM 15 MG/1
15 TABLET ORAL DAILY
Qty: 30 TABLET | Refills: 0 | Status: SHIPPED | OUTPATIENT
Start: 2023-04-19 | End: 2023-06-01

## 2023-04-19 ASSESSMENT — FIBROSIS 4 INDEX: FIB4 SCORE: 1.77

## 2023-04-19 NOTE — PROGRESS NOTES
Chief Complaint:   Chief Complaint   Patient presents with    Back Pain       HPI: Established patient  Alice Whitlock is a 77 y.o. female who presents for follow-up on evaluation of back pain    1. Atherosclerosis of aorta   Chronic stable, finding in her imaging studies.      2. Chronic right-sided low back pain with bilateral sciatica  Patient had a fall couple of months ago,  Still complaining of low back pain and pain specifically on the right hip also that has been getting worse.  Associated with some mild weakness in both lower extremities,  Pain is sometimes sharp 8/10.  Especially with certain movements like getting into her car or getting out.  MRI findings that was done after her previous fall, showed evidence of mild disc bulge at multilevel, and benign lesions that are suggestive for hemangiomas at the level of L3.  3. Pain of right hip    New concern, has been getting worse for the past few weeks, pain of the right hip.  Associated with radiation of the pain to the right lower extremity.  Denies recent trauma, patient had fall couple of months ago,      Past medical history, family history, social history and medications reviewed and updated in the record.  Today  Current medications, problem list and allergies reviewed in [facility] today  Health maintenance topics are reviewed and updated.    Patient Active Problem List    Diagnosis Date Noted    Atherosclerosis of aorta (HCC) 04/19/2023    Hyperparathyroidism (HCC) 04/14/2023    Pain of right thumb 09/29/2022    Arthralgia of back 01/20/2022    History of uterine cancer 01/06/2022    Thyroid dysfunction 01/06/2022    Encounter to establish care with new doctor 01/06/2022    Neuropathic pain, leg, left 10/02/2018    Primary localized osteoarthrosis of the hip 09/21/2018    Osteoporosis, post-menopausal 09/10/2018    Post-surgical hypothyroidism 09/10/2018    LBBB (left bundle branch block) 10/21/2016    Left knee pain 10/11/2016    FH: CAD (coronary  artery disease) 08/19/2015    GERD (gastroesophageal reflux disease) 07/09/2014    Hyperlipidemia 07/09/2014    Mixed stress and urge urinary incontinence 07/09/2014    Chronic depression 07/08/2013    History of total knee arthroplasty 07/08/2013    Total knee replacement status, left 07/08/2013    OA (osteoarthritis) of knee 11/06/2012     Family History   Problem Relation Age of Onset    Heart Disease Other     Cancer Other     Hypertension Other     Cancer Maternal Aunt         breast    Heart Disease Mother      Social History     Socioeconomic History    Marital status:      Spouse name: Not on file    Number of children: Not on file    Years of education: Not on file    Highest education level: Not on file   Occupational History    Not on file   Tobacco Use    Smoking status: Never    Smokeless tobacco: Never   Vaping Use    Vaping Use: Never used   Substance and Sexual Activity    Alcohol use: Yes     Comment: 2 a month    Drug use: No    Sexual activity: Yes     Partners: Male     Comment:     Other Topics Concern    Not on file   Social History Narrative    Not on file     Social Determinants of Health     Financial Resource Strain: Not on file   Food Insecurity: Not on file   Transportation Needs: Not on file   Physical Activity: Not on file   Stress: Not on file   Social Connections: Not on file   Intimate Partner Violence: Not on file   Housing Stability: Not on file       Current Outpatient Medications   Medication Sig Dispense Refill    meloxicam (MOBIC) 15 MG tablet Take 1 Tablet by mouth every day for 30 days. 30 Tablet 0    cyclobenzaprine (FLEXERIL) 5 mg tablet TAKE 1 TABLET BY MOUTH ONCE DAILY FOR MUSCLE SPASM OR MODERATE PAIN. 30 Tablet 0    alendronate (FOSAMAX) 70 MG Tab TAKE 1 TABLET BY MOUTH ONCE A WEEK IN THE MORNING WITH A FULL GLASS OF WATER, 30 MINUTES BEFORE THE FIRST MEAL, BEVERAGE OR MEDICATION OF THE DAY. REMAIN UPRIGHT 4 Tablet 6    rosuvastatin (CRESTOR) 5 MG Tab Take  "2 Tablets by mouth 2 times a day. 180 Tablet 3    diclofenac sodium (VOLTAREN) 1 % Gel       gabapentin (NEURONTIN) 300 MG Cap Take 1 Capsule by mouth 2 times a day. 180 Capsule 1    levothyroxine (SYNTHROID) 88 MCG Tab Take 1 Tablet by mouth every day. 90 Tablet 3    sertraline (ZOLOFT) 100 MG Tab Take 1 Tablet by mouth every day. 90 Tablet 3    ascorbic acid (VITAMIN C) 1000 MG tablet Take  by mouth.      vitamin E (VITAMIN E) 1000 Unit (450 mg) Cap Take 1,000 Units by mouth every day.      Fluticasone Propionate (FLONASE NA) Administer  into affected nostril(S).      Cyanocobalamin (VITAMIN B-12 PO) Take 1 Tab by mouth every day.      vitamin D, Ergocalciferol, (DRISDOL) 15432 UNITS Cap capsule Take 50,000 Units by mouth every 7 days.       No current facility-administered medications for this visit.         Review Of Systems  As documented in HPI above  PHYSICAL EXAMINATION:    /70 (BP Location: Right arm, Patient Position: Sitting, BP Cuff Size: Adult)   Pulse 76   Temp 36.8 °C (98.2 °F) (Temporal)   Resp 16   Ht 1.6 m (5' 3\")   Wt 66.7 kg (147 lb)   SpO2 94%   BMI 26.04 kg/m²   Gen.: Well-developed, well-nourished, no apparent distress, pleasant and cooperative with the examination  HEENT: Normocephalic/atraumatic, sinuses nontender with palpation, TMs clear, nares patent with pink mucosa and clear rhinorrhea, oropharynx clear  Neck: No JVD or bruits, no adenopathy  Cor: Regular rate and rhythm without murmur gallop or rub  Lungs: Clear to auscultation with equal breath sounds bilaterally. No wheezes, rhonchi.  Abdomen: Soft nontender without hepatosplenomegaly or masses appreciated, normoactive bowel sounds  Extremities: No cyanosis, clubbing or edema       ASSESSMENT/Plan:    1. Atherosclerosis of aorta (HCC)  Chronic stable asymptomatic      2. Chronic right-sided low back pain with bilateral sciatica  Chronic with acute exacerbation, not resolving.  Recently has been having some radiculopathy " symptoms.  MRI reviewed again, discussed with the patient referral to see neurosurgeon for further evaluation refilled her meloxicam and Flexeril, follow-up if getting worse.  Referral to Neurosurgery    meloxicam (MOBIC) 15 MG tablet    cyclobenzaprine (FLEXERIL) 5 mg tablet      3. Pain of right hip  New concern, no recent fall, advised to do an x-ray for further evaluation, follow-up as directed.  DX-HIP-COMPLETE - UNILATERAL 2+ RIGHT         Please note that this dictation was created using voice recognition software. I have made every reasonable attempt to correct obvious errors but there may be errors of grammar and content that I may have overlooked prior to finalization of this note.

## 2023-04-24 ENCOUNTER — TELEPHONE (OUTPATIENT)
Dept: HEALTH INFORMATION MANAGEMENT | Facility: OTHER | Age: 78
End: 2023-04-24

## 2023-04-25 ENCOUNTER — HOSPITAL ENCOUNTER (OUTPATIENT)
Dept: RADIOLOGY | Facility: MEDICAL CENTER | Age: 78
End: 2023-04-25
Attending: FAMILY MEDICINE
Payer: MEDICARE

## 2023-04-25 DIAGNOSIS — M25.551 PAIN OF RIGHT HIP: ICD-10-CM

## 2023-04-25 PROCEDURE — 73502 X-RAY EXAM HIP UNI 2-3 VIEWS: CPT | Mod: RT

## 2023-05-01 NOTE — TELEPHONE ENCOUNTER
Received request via: Pharmacy    Was the patient seen in the last year in this department? Yes 4/19/23    Does the patient have an active prescription (recently filled or refills available) for medication(s) requested? No    Does the patient have nursing home Plus and need 100 day supply (blood pressure, diabetes and cholesterol meds only)? Medication is not for cholesterol, blood pressure or diabetes

## 2023-05-02 RX ORDER — SERTRALINE HYDROCHLORIDE 100 MG/1
TABLET, FILM COATED ORAL
Qty: 90 TABLET | Refills: 0 | Status: SHIPPED | OUTPATIENT
Start: 2023-05-02 | End: 2023-07-31

## 2023-05-08 ENCOUNTER — APPOINTMENT (OUTPATIENT)
Dept: RADIOLOGY | Facility: IMAGING CENTER | Age: 78
End: 2023-05-08
Payer: MEDICARE

## 2023-05-08 ENCOUNTER — OFFICE VISIT (OUTPATIENT)
Dept: URGENT CARE | Facility: CLINIC | Age: 78
End: 2023-05-08
Payer: MEDICARE

## 2023-05-08 VITALS
RESPIRATION RATE: 20 BRPM | SYSTOLIC BLOOD PRESSURE: 132 MMHG | BODY MASS INDEX: 26.05 KG/M2 | DIASTOLIC BLOOD PRESSURE: 84 MMHG | TEMPERATURE: 98.9 F | HEIGHT: 63 IN | WEIGHT: 147 LBS | OXYGEN SATURATION: 96 % | HEART RATE: 80 BPM

## 2023-05-08 DIAGNOSIS — S99.922A TOE INJURY, LEFT, INITIAL ENCOUNTER: ICD-10-CM

## 2023-05-08 DIAGNOSIS — S90.122A CONTUSION OF THIRD TOE OF LEFT FOOT, INITIAL ENCOUNTER: ICD-10-CM

## 2023-05-08 PROCEDURE — 73660 X-RAY EXAM OF TOE(S): CPT | Mod: TC,FY,LT

## 2023-05-08 PROCEDURE — 99213 OFFICE O/P EST LOW 20 MIN: CPT

## 2023-05-08 RX ORDER — PREDNISOLONE SODIUM PHOSPHATE 10 MG/ML
SOLUTION/ DROPS OPHTHALMIC
COMMUNITY
Start: 2023-05-08

## 2023-05-08 RX ORDER — ACETAMINOPHEN 500 MG
1500 TABLET ORAL EVERY 6 HOURS PRN
Status: ON HOLD | COMMUNITY
End: 2024-01-22

## 2023-05-08 ASSESSMENT — FIBROSIS 4 INDEX: FIB4 SCORE: 1.77

## 2023-05-09 DIAGNOSIS — M79.2 NEUROPATHIC PAIN, LEG, LEFT: ICD-10-CM

## 2023-05-09 RX ORDER — GABAPENTIN 300 MG/1
CAPSULE ORAL
Qty: 180 CAPSULE | Refills: 0 | Status: SHIPPED | OUTPATIENT
Start: 2023-05-09 | End: 2023-10-10

## 2023-05-09 NOTE — PROGRESS NOTES
Subjective:   Alice Whitlock is a 77 y.o. female who presents for Toe Injury (As she was getting out of the bathtub, Lt middle toe x today, bruised, swollen, painful as she moves it, will travel)      HPI:    Patient presents urgent care with concerns of left middle toe injury  States that she was climbing out of the bathtub she banged her toe on the bathtub drain States the toe is bruised, swollen, tender to the touch and painful to move  Has taken Tylenol for the discomfort  Denies radiation of pain, numbness/tingling.  She has been able to walk since the injury  Denies use of anticoagulants  Denies history of toe injuries    ROS As above in HPI    Medications:    Current Outpatient Medications on File Prior to Visit   Medication Sig Dispense Refill    acetaminophen (TYLENOL) 500 MG Tab Take 1,500 mg by mouth every 6 hours as needed.      sertraline (ZOLOFT) 100 MG Tab Take 1 tablet by mouth once daily 90 Tablet 0    meloxicam (MOBIC) 15 MG tablet Take 1 Tablet by mouth every day for 30 days. 30 Tablet 0    cyclobenzaprine (FLEXERIL) 5 mg tablet TAKE 1 TABLET BY MOUTH ONCE DAILY FOR MUSCLE SPASM OR MODERATE PAIN. 30 Tablet 0    alendronate (FOSAMAX) 70 MG Tab TAKE 1 TABLET BY MOUTH ONCE A WEEK IN THE MORNING WITH A FULL GLASS OF WATER, 30 MINUTES BEFORE THE FIRST MEAL, BEVERAGE OR MEDICATION OF THE DAY. REMAIN UPRIGHT 4 Tablet 6    rosuvastatin (CRESTOR) 5 MG Tab Take 2 Tablets by mouth 2 times a day. 180 Tablet 3    diclofenac sodium (VOLTAREN) 1 % Gel       levothyroxine (SYNTHROID) 88 MCG Tab Take 1 Tablet by mouth every day. 90 Tablet 3    ascorbic acid (VITAMIN C) 1000 MG tablet Take  by mouth.      vitamin E (VITAMIN E) 1000 Unit (450 mg) Cap Take 1,000 Units by mouth every day.      Fluticasone Propionate (FLONASE NA) Administer  into affected nostril(S).      Cyanocobalamin (VITAMIN B-12 PO) Take 1 Tab by mouth every day.      vitamin D, Ergocalciferol, (DRISDOL) 26702 UNITS Cap capsule Take 50,000 Units  by mouth every 7 days.      prednisoLONE sodium phosphate (INFLAMASE FORTE) 1 % Solution        No current facility-administered medications on file prior to visit.        Allergies:   Penicillins    Problem List:   Patient Active Problem List   Diagnosis    OA (osteoarthritis) of knee    Left knee pain    Chronic depression    FH: CAD (coronary artery disease)    GERD (gastroesophageal reflux disease)    History of uterine cancer    Hyperlipidemia    LBBB (left bundle branch block)    Mixed stress and urge urinary incontinence    Neuropathic pain, leg, left    Osteoporosis, post-menopausal    Post-surgical hypothyroidism    Primary localized osteoarthrosis of the hip    Thyroid dysfunction    History of total knee arthroplasty    Total knee replacement status, left    Encounter to establish care with new doctor    Arthralgia of back    Pain of right thumb    Hyperparathyroidism (HCC)    Atherosclerosis of aorta (HCC)        Surgical History:  Past Surgical History:   Procedure Laterality Date    HYSTERECTOMY ROBOTIC XI  1/17/2019    Procedure: HYSTERECTOMY ROBOTIC XI;  Surgeon: Shahid Paez M.D.;  Location: [facility];  Service: Gynecology    SALPINGECTOMY Bilateral 1/17/2019    Procedure: SALPINGECTOMY;  Surgeon: Shahid Paez M.D.;  Location: [facility];  Service: Gynecology    OOPHORECTOMY Bilateral 1/17/2019    Procedure: OOPHORECTOMY;  Surgeon: Shahid Paez M.D.;  Location: [facility];  Service: Gynecology    NODE BIOPSY SENTINEL  1/17/2019    Procedure: NODE BIOPSY SENTINEL;  Surgeon: Shahid Paez M.D.;  Location: [facility];  Service: Gynecology    KNEE ARTHROPLASTY TOTAL Left 10/11/2016    Procedure: KNEE ARTHROPLASTY TOTAL;  Surgeon: Beverly Chauhan M.D.;  Location: [facility];  Service:     KNEE ARTHROPLASTY TOTAL  11/6/2012    Performed by Beverly Chauhan M.D. at [facility]    KNEE ARTHROSCOPY  1/18/2011    Performed by  "MARILEE NAGEL at SURGERY [facility] ORS    MENISCECTOMY, KNEE, MEDIAL  1/18/2011    Performed by MARILEE NAGEL at SURGERY [facility]    MENISCECTOMY  1/18/2011    Performed by MARILEE NAGEL at SURGERY [facility]    SYNOVECTOMY  1/18/2011    Performed by MARILEE NAGEL at [facility]    BLADDER SUSPENSION  2000    THYROIDECTOMY TOTAL  1990    MAMMOPLASTY AUGMENTATION  1981    UT BREAST AUGMENTATION WITH IMPLANT  1980    OTHER SURGICAL PROCEDURE  1979    excision thyroid nodules    LAPAROTOMY  1972    endometriosis    TONSILLECTOMY  1957       Past Social Hx:   Social History     Tobacco Use    Smoking status: Never    Smokeless tobacco: Never   Vaping Use    Vaping Use: Never used   Substance Use Topics    Alcohol use: Not Currently     Comment: 2 a month    Drug use: No          Problem list, medications, and allergies reviewed by myself today in Epic.     Objective:     /84 (BP Location: Left arm, Patient Position: Sitting, BP Cuff Size: Adult)   Pulse 80   Temp 37.2 °C (98.9 °F) (Temporal)   Resp 20   Ht 1.6 m (5' 3\")   Wt 66.7 kg (147 lb)   SpO2 96%   BMI 26.04 kg/m²     Physical Exam  Vitals and nursing note reviewed.   Constitutional:       Appearance: Normal appearance.   Cardiovascular:      Rate and Rhythm: Normal rate.      Heart sounds: Normal heart sounds.   Pulmonary:      Effort: Pulmonary effort is normal.      Breath sounds: Normal breath sounds.   Musculoskeletal:         General: Swelling, tenderness and signs of injury present.      Right lower leg: No edema.      Left lower leg: No edema.        Feet:    Feet:      Comments: Left middle toe is ecchymotic, edematous.  There is tenderness to palpation, pain is also elicited with movement.  No tenderness to forefoot, ankle, lower leg.   Skin:     Capillary Refill: Capillary refill takes less than 2 seconds.      Findings: Bruising and erythema present.   Neurological:      Mental Status: She is alert and " oriented to person, place, and time.      Sensory: No sensory deficit.      Motor: No weakness.      Gait: Gait normal.       DX-TOE(S) 2+ LEFT 05/08/2023    Narrative  5/8/2023 7:00 PM    HISTORY/REASON FOR EXAM:  Pain/Deformity Following Trauma; Injured toe on bathtub drain, Middle toe swollen, bruised, TTP.    TECHNIQUE/EXAM DESCRIPTION AND NUMBER OF VIEWS:  3 views of the LEFT toes.    COMPARISON: None    FINDINGS:  Osteopenia is present, decreasing sensitivity of the study for detection of acute displaced fracture.    There is normal variant developmental fusion of the third through fifth DIP joints    Soft tissue swelling involving the third toe is identified    There is severe third interphalangeal joint of bony spurring and asymmetric space narrowing with some proximal phalanx shortening suggesting this is likely secondary osteoarthritis from healed fracture    No acute displaced fracture is detected    Impression  Third toe soft tissue swelling and interphalangeal joint osteoarthritis but no acute displaced fracture is identified    Assessment/Plan:     Diagnosis and associated orders:   1. Contusion of third toe of left foot, initial encounter    2. Toe injury, left, initial encounter  - DX-TOE(S) 2+ LEFT; Future  - Walking Boot       Comments/MDM:       Per radiology impression, there is severe third interphalangeal joint bony spurring and asymmetric space narrowing with some proximal phalanx shortening suggesting this is likely secondary to osteoarthritis from healed fracture.  No acute fracture or displacement is appreciated.  Patient declined joyce taping, walking boot.  Recommended rest, elevation of left lower extremity, application of ice packs 5-6 times a day, use of scheduled Tylenol for discomfort, shoes with a wide toe box.  Return to urgent care if there is no improvement in 10 to 14 days  Follow-up with primary care is advised.       Please note that this dictation was created using voice  recognition software. I have made a reasonable attempt to correct obvious errors, but I expect that there are errors of grammar and possibly content that I did not discover before finalizing the note.    This note was electronically signed by Penny Torres DNP

## 2023-05-09 NOTE — TELEPHONE ENCOUNTER
Received request via: Pharmacy    Was the patient seen in the last year in this department? Yes  LOV 04/19/2023  Does the patient have an active prescription (recently filled or refills available) for medication(s) requested? No    Does the patient have assisted Plus and need 100 day supply (blood pressure, diabetes and cholesterol meds only)? Medication is not for cholesterol, blood pressure or diabetes and Patient does not have SCP

## 2023-05-31 DIAGNOSIS — M54.42 CHRONIC RIGHT-SIDED LOW BACK PAIN WITH BILATERAL SCIATICA: ICD-10-CM

## 2023-05-31 DIAGNOSIS — G89.29 CHRONIC RIGHT-SIDED LOW BACK PAIN WITH BILATERAL SCIATICA: ICD-10-CM

## 2023-05-31 DIAGNOSIS — M54.41 CHRONIC RIGHT-SIDED LOW BACK PAIN WITH BILATERAL SCIATICA: ICD-10-CM

## 2023-05-31 NOTE — TELEPHONE ENCOUNTER
Received request via: Pharmacy    Was the patient seen in the last year in this department? Yes  4/19/23    Does the patient have an active prescription (recently filled or refills available) for medication(s) requested? No    Does the patient have snf Plus and need 100 day supply (blood pressure, diabetes and cholesterol meds only)? Medication is not for cholesterol, blood pressure or diabetes

## 2023-06-01 PROBLEM — Z76.89 ENCOUNTER TO ESTABLISH CARE WITH NEW DOCTOR: Status: RESOLVED | Noted: 2022-01-06 | Resolved: 2023-06-01

## 2023-06-01 RX ORDER — LEVOTHYROXINE SODIUM 88 UG/1
TABLET ORAL
Qty: 90 TABLET | Refills: 0 | Status: SHIPPED | OUTPATIENT
Start: 2023-06-01 | End: 2023-09-05

## 2023-06-01 RX ORDER — CYCLOBENZAPRINE HCL 5 MG
TABLET ORAL
Qty: 30 TABLET | Refills: 0 | Status: SHIPPED | OUTPATIENT
Start: 2023-06-01 | End: 2023-07-05

## 2023-06-01 RX ORDER — MELOXICAM 15 MG/1
TABLET ORAL
Qty: 30 TABLET | Refills: 0 | Status: SHIPPED | OUTPATIENT
Start: 2023-06-01 | End: 2023-07-05

## 2023-06-02 ENCOUNTER — APPOINTMENT (OUTPATIENT)
Dept: ENDOCRINOLOGY | Facility: MEDICAL CENTER | Age: 78
End: 2023-06-02
Payer: MEDICARE

## 2023-06-06 ENCOUNTER — OFFICE VISIT (OUTPATIENT)
Dept: MEDICAL GROUP | Facility: LAB | Age: 78
End: 2023-06-06
Payer: MEDICARE

## 2023-06-06 VITALS
RESPIRATION RATE: 16 BRPM | WEIGHT: 148 LBS | DIASTOLIC BLOOD PRESSURE: 70 MMHG | SYSTOLIC BLOOD PRESSURE: 110 MMHG | TEMPERATURE: 97.5 F | HEART RATE: 72 BPM | HEIGHT: 63 IN | BODY MASS INDEX: 26.22 KG/M2 | OXYGEN SATURATION: 96 %

## 2023-06-06 DIAGNOSIS — M54.50 CHRONIC LOW BACK PAIN, UNSPECIFIED BACK PAIN LATERALITY, UNSPECIFIED WHETHER SCIATICA PRESENT: ICD-10-CM

## 2023-06-06 DIAGNOSIS — F33.42 RECURRENT MAJOR DEPRESSIVE DISORDER, IN FULL REMISSION (HCC): ICD-10-CM

## 2023-06-06 DIAGNOSIS — G89.29 CHRONIC LOW BACK PAIN, UNSPECIFIED BACK PAIN LATERALITY, UNSPECIFIED WHETHER SCIATICA PRESENT: ICD-10-CM

## 2023-06-06 DIAGNOSIS — M79.671 RIGHT FOOT PAIN: ICD-10-CM

## 2023-06-06 PROCEDURE — 99214 OFFICE O/P EST MOD 30 MIN: CPT | Performed by: FAMILY MEDICINE

## 2023-06-06 PROCEDURE — 3074F SYST BP LT 130 MM HG: CPT | Performed by: FAMILY MEDICINE

## 2023-06-06 PROCEDURE — 3078F DIAST BP <80 MM HG: CPT | Performed by: FAMILY MEDICINE

## 2023-06-06 ASSESSMENT — FIBROSIS 4 INDEX: FIB4 SCORE: 1.77

## 2023-06-06 NOTE — PROGRESS NOTES
Chief Complaint:   Chief Complaint   Patient presents with    Follow-Up       HPI: Established patient  Alice Whitlock is a 77 y.o. female who presents for follow-up, discussed the following today:    1. Recurrent major depressive disorder, in full remission   Chronic, well-controlled on medications denies concerns at this time.  No side effects from medications    2. Right foot pain  New concern, patient recently came back from vacation and she did a lot of walking, she said right foot was hurting her too much especially on pressing on the foot and with weightbearing.  Now it is better but not resolved completely.  Denies injury or fall or tripping    3. Chronic low back pain,  Chronic ongoing after fall at work.  Patient CT showed evidence of S3 possible fracture.  Patient has an appointment to follow-up with a specialist/spine surgeon tomorrow, no changes in her back pain.  No red flag symptoms at this time      Past medical history, family history, social history and medications reviewed and updated in the record.  Today  Current medications, problem list and allergies reviewed in [facility] today  Health maintenance topics are reviewed and updated.    Patient Active Problem List    Diagnosis Date Noted    Recurrent major depressive disorder, in full remission ([facility]) 06/06/2023    Atherosclerosis of aorta ([facility]) 04/19/2023    Hyperparathyroidism ([facility]) 04/14/2023    Pain of right thumb 09/29/2022    Arthralgia of back 01/20/2022    History of uterine cancer 01/06/2022    Thyroid dysfunction 01/06/2022    Neuropathic pain, leg, left 10/02/2018    Primary localized osteoarthrosis of the hip 09/21/2018    Osteoporosis, post-menopausal 09/10/2018    Post-surgical hypothyroidism 09/10/2018    LBBB (left bundle branch block) 10/21/2016    Left knee pain 10/11/2016    FH: CAD (coronary artery disease) 08/19/2015    GERD (gastroesophageal reflux disease) 07/09/2014    Hyperlipidemia 07/09/2014    Mixed stress and urge urinary  incontinence 07/09/2014    Chronic depression 07/08/2013    History of total knee arthroplasty 07/08/2013    Total knee replacement status, left 07/08/2013    OA (osteoarthritis) of knee 11/06/2012     Family History   Problem Relation Age of Onset    Heart Disease Other     Cancer Other     Hypertension Other     Cancer Maternal Aunt         breast    Heart Disease Mother      Social History     Socioeconomic History    Marital status:      Spouse name: Not on file    Number of children: Not on file    Years of education: Not on file    Highest education level: Not on file   Occupational History    Not on file   Tobacco Use    Smoking status: Never    Smokeless tobacco: Never   Vaping Use    Vaping Use: Never used   Substance and Sexual Activity    Alcohol use: Not Currently     Comment: 2 a month    Drug use: No    Sexual activity: Yes     Partners: Male     Comment:     Other Topics Concern    Not on file   Social History Narrative    Not on file     Social Determinants of Health     Financial Resource Strain: Not on file   Food Insecurity: Not on file   Transportation Needs: Not on file   Physical Activity: Not on file   Stress: Not on file   Social Connections: Not on file   Intimate Partner Violence: Not on file   Housing Stability: Not on file       Current Outpatient Medications   Medication Sig Dispense Refill    meloxicam (MOBIC) 15 MG tablet Take 1 tablet by mouth once daily for 30 days 30 Tablet 0    levothyroxine (SYNTHROID) 88 MCG Tab Take 1 tablet by mouth once daily 90 Tablet 0    cyclobenzaprine (FLEXERIL) 5 mg tablet TAKE 1 TABLET BY MOUTH ONCE DAILY FOR MUSCLE SPASM OR  MODERATE  PAIN 30 Tablet 0    gabapentin (NEURONTIN) 300 MG Cap Take 1 capsule by mouth twice daily 180 Capsule 0    acetaminophen (TYLENOL) 500 MG Tab Take 1,500 mg by mouth every 6 hours as needed.      prednisoLONE sodium phosphate (INFLAMASE FORTE) 1 % Solution       sertraline (ZOLOFT) 100 MG Tab Take 1 tablet by  "mouth once daily 90 Tablet 0    alendronate (FOSAMAX) 70 MG Tab TAKE 1 TABLET BY MOUTH ONCE A WEEK IN THE MORNING WITH A FULL GLASS OF WATER, 30 MINUTES BEFORE THE FIRST MEAL, BEVERAGE OR MEDICATION OF THE DAY. REMAIN UPRIGHT 4 Tablet 6    rosuvastatin (CRESTOR) 5 MG Tab Take 2 Tablets by mouth 2 times a day. 180 Tablet 3    diclofenac sodium (VOLTAREN) 1 % Gel       ascorbic acid (VITAMIN C) 1000 MG tablet Take  by mouth.      vitamin E (VITAMIN E) 1000 Unit (450 mg) Cap Take 1,000 Units by mouth every day.      Fluticasone Propionate (FLONASE NA) Administer  into affected nostril(S).      Cyanocobalamin (VITAMIN B-12 PO) Take 1 Tab by mouth every day.      vitamin D, Ergocalciferol, (DRISDOL) 25495 UNITS Cap capsule Take 50,000 Units by mouth every 7 days.       No current facility-administered medications for this visit.         Review Of Systems  As documented in HPI above  PHYSICAL EXAMINATION:    /70 (BP Location: Right arm, Patient Position: Sitting, BP Cuff Size: Adult)   Pulse 72   Temp 36.4 °C (97.5 °F) (Temporal)   Resp 16   Ht 1.6 m (5' 3\")   Wt 67.1 kg (148 lb)   SpO2 96%   BMI 26.22 kg/m²   Gen.: Well-developed, well-nourished, no apparent distress, pleasant and cooperative with the examination  HEENT: Normocephalic/atraumatic, sinuses nontender with palpation, TMs clear, nares patent with pink mucosa and clear rhinorrhea, oropharynx clear  Neck: No JVD or bruits, no adenopathy  Cor: Regular rate and rhythm without murmur gallop or rub  Lungs: Clear to auscultation with equal breath sounds bilaterally. No wheezes, rhonchi.  Abdomen: Soft nontender without hepatosplenomegaly or masses appreciated, normoactive bowel sounds  Extremities: No cyanosis, clubbing or edema       ASSESSMENT/Plan:  1. Recurrent major depressive disorder, in full remission (HCC)  Chronic well-controlled continue current regimen      2. Right foot pain  New, clinical exam is benign, differential diagnosis includes " plantar fasciitis versus stress fracture, advised to do an x-ray, discussed with the patient stretching exercises for plantar fasciitis, to continue doing them at home, declines Medrol Dosepak at this time.  Patient to use Tylenol as needed for pain control  DX-FOOT-COMPLETE 3+ RIGHT      3. Chronic low back pain, unspecified back pain laterality, unspecified whether sciatica present  Chronic ongoing, has an appointment to follow-up with a spine specialist, no red flags at this time.         Please note that this dictation was created using voice recognition software. I have worked with consultants from the vendor as well as technical experts from [employer] to optimize the interface. I have made every reasonable attempt to correct obvious errors, but I expect that there are errors of grammar and possibly content that I did not discover before finalizing the note.

## 2023-06-07 ENCOUNTER — APPOINTMENT (OUTPATIENT)
Dept: RADIOLOGY | Facility: MEDICAL CENTER | Age: 78
End: 2023-06-07
Attending: FAMILY MEDICINE
Payer: MEDICARE

## 2023-06-07 DIAGNOSIS — M79.671 RIGHT FOOT PAIN: ICD-10-CM

## 2023-06-07 PROCEDURE — 73630 X-RAY EXAM OF FOOT: CPT | Mod: RT

## 2023-07-02 DIAGNOSIS — M54.41 CHRONIC RIGHT-SIDED LOW BACK PAIN WITH BILATERAL SCIATICA: ICD-10-CM

## 2023-07-02 DIAGNOSIS — G89.29 CHRONIC RIGHT-SIDED LOW BACK PAIN WITH BILATERAL SCIATICA: ICD-10-CM

## 2023-07-02 DIAGNOSIS — M54.42 CHRONIC RIGHT-SIDED LOW BACK PAIN WITH BILATERAL SCIATICA: ICD-10-CM

## 2023-07-03 NOTE — TELEPHONE ENCOUNTER
Received request via: Pharmacy    Was the patient seen in the last year in this department? Yes  06/06/2023    Does the patient have an active prescription (recently filled or refills available) for medication(s) requested? No    Does the patient have half-way Plus and need 100 day supply (blood pressure, diabetes and cholesterol meds only)? Patient does not have SCP

## 2023-07-05 RX ORDER — CYCLOBENZAPRINE HCL 5 MG
TABLET ORAL
Qty: 30 TABLET | Refills: 0 | Status: SHIPPED | OUTPATIENT
Start: 2023-07-05 | End: 2023-07-31

## 2023-07-05 RX ORDER — MELOXICAM 15 MG/1
TABLET ORAL
Qty: 30 TABLET | Refills: 0 | Status: SHIPPED | OUTPATIENT
Start: 2023-07-05 | End: 2023-07-31

## 2023-07-30 DIAGNOSIS — M54.42 CHRONIC RIGHT-SIDED LOW BACK PAIN WITH BILATERAL SCIATICA: ICD-10-CM

## 2023-07-30 DIAGNOSIS — G89.29 CHRONIC RIGHT-SIDED LOW BACK PAIN WITH BILATERAL SCIATICA: ICD-10-CM

## 2023-07-30 DIAGNOSIS — M54.41 CHRONIC RIGHT-SIDED LOW BACK PAIN WITH BILATERAL SCIATICA: ICD-10-CM

## 2023-07-31 RX ORDER — SERTRALINE HYDROCHLORIDE 100 MG/1
TABLET, FILM COATED ORAL
Qty: 90 TABLET | Refills: 0 | Status: SHIPPED | OUTPATIENT
Start: 2023-07-31 | End: 2023-10-23

## 2023-07-31 RX ORDER — MELOXICAM 15 MG/1
TABLET ORAL
Qty: 30 TABLET | Refills: 0 | Status: SHIPPED | OUTPATIENT
Start: 2023-07-31 | End: 2023-09-07

## 2023-07-31 RX ORDER — CYCLOBENZAPRINE HCL 5 MG
TABLET ORAL
Qty: 30 TABLET | Refills: 0 | Status: SHIPPED | OUTPATIENT
Start: 2023-07-31 | End: 2023-09-05

## 2023-07-31 NOTE — TELEPHONE ENCOUNTER
Received request via: Pharmacy    Was the patient seen in the last year in this department? Yes  LOV: 6/6/2023  Does the patient have an active prescription (recently filled or refills available) for medication(s) requested? No    Does the patient have care home Plus and need 100 day supply (blood pressure, diabetes and cholesterol meds only)? Patient does not have SCP

## 2023-09-05 DIAGNOSIS — G89.29 CHRONIC RIGHT-SIDED LOW BACK PAIN WITH BILATERAL SCIATICA: ICD-10-CM

## 2023-09-05 DIAGNOSIS — M54.41 CHRONIC RIGHT-SIDED LOW BACK PAIN WITH BILATERAL SCIATICA: ICD-10-CM

## 2023-09-05 DIAGNOSIS — M54.42 CHRONIC RIGHT-SIDED LOW BACK PAIN WITH BILATERAL SCIATICA: ICD-10-CM

## 2023-09-05 RX ORDER — LEVOTHYROXINE SODIUM 88 UG/1
TABLET ORAL
Qty: 90 TABLET | Refills: 3 | Status: SHIPPED | OUTPATIENT
Start: 2023-09-05

## 2023-09-05 RX ORDER — CYCLOBENZAPRINE HCL 5 MG
TABLET ORAL
Qty: 30 TABLET | Refills: 0 | Status: SHIPPED | OUTPATIENT
Start: 2023-09-05 | End: 2023-10-06

## 2023-09-06 NOTE — TELEPHONE ENCOUNTER
Received request via: Pharmacy    Was the patient seen in the last year in this department? Yes  6/6/23  Does the patient have an active prescription (recently filled or refills available) for medication(s) requested? No    Does the patient have snf Plus and need 100 day supply (blood pressure, diabetes and cholesterol meds only)? Medication is not for cholesterol, blood pressure or diabetes

## 2023-09-06 NOTE — TELEPHONE ENCOUNTER
Received request via: Pharmacy    Was the patient seen in the last year in this department? Yes 6/6/2023    Does the patient have an active prescription (recently filled or refills available) for medication(s) requested? No    Does the patient have jail Plus and need 100 day supply (blood pressure, diabetes and cholesterol meds only)? Patient does not have SCP

## 2023-09-07 RX ORDER — MELOXICAM 15 MG/1
TABLET ORAL
Qty: 30 TABLET | Refills: 0 | Status: SHIPPED | OUTPATIENT
Start: 2023-09-07 | End: 2023-10-06

## 2023-10-06 ENCOUNTER — OFFICE VISIT (OUTPATIENT)
Dept: MEDICAL GROUP | Facility: LAB | Age: 78
End: 2023-10-06
Payer: MEDICARE

## 2023-10-06 VITALS
BODY MASS INDEX: 25.41 KG/M2 | HEART RATE: 76 BPM | HEIGHT: 63 IN | OXYGEN SATURATION: 96 % | WEIGHT: 143.4 LBS | DIASTOLIC BLOOD PRESSURE: 68 MMHG | RESPIRATION RATE: 14 BRPM | TEMPERATURE: 98.2 F | SYSTOLIC BLOOD PRESSURE: 120 MMHG

## 2023-10-06 DIAGNOSIS — K59.00 CONSTIPATION, UNSPECIFIED CONSTIPATION TYPE: ICD-10-CM

## 2023-10-06 DIAGNOSIS — E78.5 HYPERLIPIDEMIA, UNSPECIFIED HYPERLIPIDEMIA TYPE: ICD-10-CM

## 2023-10-06 DIAGNOSIS — M54.42 CHRONIC RIGHT-SIDED LOW BACK PAIN WITH BILATERAL SCIATICA: ICD-10-CM

## 2023-10-06 DIAGNOSIS — G89.29 CHRONIC RIGHT-SIDED LOW BACK PAIN WITH BILATERAL SCIATICA: ICD-10-CM

## 2023-10-06 DIAGNOSIS — C54.1 MALIGNANT NEOPLASM OF ENDOMETRIUM (HCC): ICD-10-CM

## 2023-10-06 DIAGNOSIS — M79.671 RIGHT FOOT PAIN: ICD-10-CM

## 2023-10-06 DIAGNOSIS — M54.41 CHRONIC RIGHT-SIDED LOW BACK PAIN WITH BILATERAL SCIATICA: ICD-10-CM

## 2023-10-06 PROCEDURE — 3078F DIAST BP <80 MM HG: CPT | Performed by: FAMILY MEDICINE

## 2023-10-06 PROCEDURE — 3074F SYST BP LT 130 MM HG: CPT | Performed by: FAMILY MEDICINE

## 2023-10-06 PROCEDURE — 99214 OFFICE O/P EST MOD 30 MIN: CPT | Performed by: FAMILY MEDICINE

## 2023-10-06 RX ORDER — ROSUVASTATIN CALCIUM 10 MG/1
10 TABLET, COATED ORAL EVERY EVENING
Qty: 30 TABLET | Refills: 11 | Status: SHIPPED | OUTPATIENT
Start: 2023-10-06

## 2023-10-06 RX ORDER — SENNA AND DOCUSATE SODIUM 50; 8.6 MG/1; MG/1
1 TABLET, FILM COATED ORAL DAILY
Qty: 30 TABLET | Refills: 11 | Status: SHIPPED | OUTPATIENT
Start: 2023-10-06

## 2023-10-06 RX ORDER — CYCLOBENZAPRINE HCL 5 MG
TABLET ORAL
Qty: 30 TABLET | Refills: 0 | Status: SHIPPED | OUTPATIENT
Start: 2023-10-06 | End: 2023-10-23

## 2023-10-06 RX ORDER — MELOXICAM 15 MG/1
TABLET ORAL
Qty: 30 TABLET | Refills: 0 | Status: SHIPPED | OUTPATIENT
Start: 2023-10-06 | End: 2023-10-23

## 2023-10-06 ASSESSMENT — FIBROSIS 4 INDEX: FIB4 SCORE: 1.79

## 2023-10-06 NOTE — PROGRESS NOTES
Chief Complaint:   Chief Complaint   Patient presents with    Follow-Up     4 month, discuss medications        HPI:Established patient  Alice Whitlock is a 78 y.o. female who presents for follow-up, discussed the following today:    1. Hyperlipidemia, unspecified hyperlipidemia type  Chronic, continues to take her statins 10 mg every day denies side effects.    2. Right foot pain  Improved after following up with podiatry, patient received steroid injections.  She continue follow-up with podiatry and wearing shoe inserts.    3. Malignant neoplasm of endometrium  Chronic in the past, resolved after surgery and treatment.  Continues to follow-up with oncology no concerns at this time.    4. Constipation, unspecified constipation type    Chronic ongoing problem.  Patient admits that she does not drink lots of water or eats lots of fiber.  She is trying her best to push fluids and water uses stool softener denies abdominal pain now, had colonoscopy about 1 year ago and it was within normal limits.  Denies blood in stool.        Past medical history, family history, social history and medications reviewed and updated in the record. Today   Current medications, problem list and allergies reviewed in [facility] today   Health maintenance topics are reviewed and updated.    Patient Active Problem List    Diagnosis Date Noted    History of uterine cancer 01/06/2022    Malignant neoplasm of endometrium (HCC) 10/06/2023    Recurrent major depressive disorder, in full remission (HCC) 06/06/2023    Atherosclerosis of aorta (HCC) 04/19/2023    Hyperparathyroidism (HCC) 04/14/2023    Pain of right thumb 09/29/2022    Arthralgia of back 01/20/2022    Thyroid dysfunction 01/06/2022    Neuropathic pain, leg, left 10/02/2018    Primary localized osteoarthrosis of the hip 09/21/2018    Osteoporosis, post-menopausal 09/10/2018    Post-surgical hypothyroidism 09/10/2018    LBBB (left bundle branch block) 10/21/2016    Left knee pain  10/11/2016    FH: CAD (coronary artery disease) 08/19/2015    GERD (gastroesophageal reflux disease) 07/09/2014    Hyperlipidemia 07/09/2014    Mixed stress and urge urinary incontinence 07/09/2014    Chronic depression 07/08/2013    History of total knee arthroplasty 07/08/2013    Total knee replacement status, left 07/08/2013    OA (osteoarthritis) of knee 11/06/2012     Family History   Problem Relation Age of Onset    Heart Disease Other     Cancer Other     Hypertension Other     Cancer Maternal Aunt         breast    Heart Disease Mother      Social History     Socioeconomic History    Marital status:      Spouse name: Not on file    Number of children: Not on file    Years of education: Not on file    Highest education level: Not on file   Occupational History    Not on file   Tobacco Use    Smoking status: Never    Smokeless tobacco: Never   Vaping Use    Vaping Use: Never used   Substance and Sexual Activity    Alcohol use: Not Currently     Comment: 2 a month    Drug use: No    Sexual activity: Yes     Partners: Male     Comment:     Other Topics Concern    Not on file   Social History Narrative    Not on file     Social Determinants of Health     Financial Resource Strain: Not on file   Food Insecurity: Not on file   Transportation Needs: Not on file   Physical Activity: Not on file   Stress: Not on file   Social Connections: Not on file   Intimate Partner Violence: Not on file   Housing Stability: Not on file     Current Outpatient Medications   Medication Sig Dispense Refill    rosuvastatin (CRESTOR) 10 MG Tab Take 1 Tablet by mouth every evening. 30 Tablet 11    sennosides-docusate sodium (SENOKOT-S) 8.6-50 MG tablet Take 1 Tablet by mouth every day. 30 Tablet 11    meloxicam (MOBIC) 15 MG tablet Take 1 tablet by mouth once daily 30 Tablet 0    levothyroxine (SYNTHROID) 88 MCG Tab Take 1 tablet by mouth once daily 90 Tablet 3    cyclobenzaprine (FLEXERIL) 5 mg tablet TAKE 1 TABLET BY  "MOUTH ONCE DAILY FOR MUSCLE SPASM OR  MODERATE  PAIN 30 Tablet 0    sertraline (ZOLOFT) 100 MG Tab Take 1 tablet by mouth once daily 90 Tablet 0    gabapentin (NEURONTIN) 300 MG Cap Take 1 capsule by mouth twice daily 180 Capsule 0    acetaminophen (TYLENOL) 500 MG Tab Take 1,500 mg by mouth every 6 hours as needed.      prednisoLONE sodium phosphate (INFLAMASE FORTE) 1 % Solution       alendronate (FOSAMAX) 70 MG Tab TAKE 1 TABLET BY MOUTH ONCE A WEEK IN THE MORNING WITH A FULL GLASS OF WATER, 30 MINUTES BEFORE THE FIRST MEAL, BEVERAGE OR MEDICATION OF THE DAY. REMAIN UPRIGHT 4 Tablet 6    diclofenac sodium (VOLTAREN) 1 % Gel       ascorbic acid (VITAMIN C) 1000 MG tablet Take  by mouth.      vitamin E (VITAMIN E) 1000 Unit (450 mg) Cap Take 1,000 Units by mouth every day.      Fluticasone Propionate (FLONASE NA) Administer  into affected nostril(S).      Cyanocobalamin (VITAMIN B-12 PO) Take 1 Tab by mouth every day.      vitamin D, Ergocalciferol, (DRISDOL) 01788 UNITS Cap capsule Take 50,000 Units by mouth every 7 days.       No current facility-administered medications for this visit.           Review Of Systems  As documented in HPI above  PHYSICAL EXAMINATION:    /68 (BP Location: Left arm, Patient Position: Sitting, BP Cuff Size: Adult)   Pulse 76   Temp 36.8 °C (98.2 °F)   Resp 14   Ht 1.6 m (5' 3\")   Wt 65 kg (143 lb 6.4 oz)   SpO2 96%   BMI 25.40 kg/m²   Gen.: Well-developed, well-nourished, no apparent distress, pleasant and cooperative with the examination  HEENT: Normocephalic/atraumatic,   Neck: No JVD or bruits, no adenopathy  Cor: Regular rate and rhythm without murmur gallop or rub  Lungs: Clear to auscultation with equal breath sounds bilaterally. No wheezes, rhonchi.  Abdomen: Soft nontender without hepatosplenomegaly or masses appreciated, normoactive bowel sounds  Extremities: No cyanosis, clubbing or edema       ASSESSMENT/Plan:  1. Hyperlipidemia, unspecified hyperlipidemia type  " Chronic, stable continue current regimen    rosuvastatin (CRESTOR) 10 MG Tab      2. Right foot pain  Chronic improved continue follow-up with podiatry and continue current regimen      3. Malignant neoplasm of endometrium (HCC)  This is by history, no recurrence.  Continues to follow-up with oncology.  No concerns stable      4. Constipation, unspecified constipation type  Chronic, advised to increase water and fiber intake.  Start Senokot to use as needed.  If any abdominal pain or concerns patient to come back for further assessment.    sennosides-docusate sodium (SENOKOT-S) 8.6-50 MG tablet         Please note that this dictation was created using voice recognition software. I have made every reasonable attempt to correct obvious errors but there may be errors of grammar and content that I may have overlooked prior to finalization of this note.

## 2023-10-09 DIAGNOSIS — M79.2 NEUROPATHIC PAIN, LEG, LEFT: ICD-10-CM

## 2023-10-09 NOTE — TELEPHONE ENCOUNTER
Received request via: Pharmacy    Was the patient seen in the last year in this department? Yes  10/6/23  Does the patient have an active prescription (recently filled or refills available) for medication(s) requested? No    Does the patient have halfway Plus and need 100 day supply (blood pressure, diabetes and cholesterol meds only)? Medication is not for cholesterol, blood pressure or diabetes

## 2023-10-10 RX ORDER — GABAPENTIN 300 MG/1
CAPSULE ORAL
Qty: 180 CAPSULE | Refills: 0 | Status: ON HOLD | OUTPATIENT
Start: 2023-10-10 | End: 2024-01-22

## 2023-10-23 DIAGNOSIS — M54.41 CHRONIC RIGHT-SIDED LOW BACK PAIN WITH BILATERAL SCIATICA: ICD-10-CM

## 2023-10-23 DIAGNOSIS — G89.29 CHRONIC RIGHT-SIDED LOW BACK PAIN WITH BILATERAL SCIATICA: ICD-10-CM

## 2023-10-23 DIAGNOSIS — M54.42 CHRONIC RIGHT-SIDED LOW BACK PAIN WITH BILATERAL SCIATICA: ICD-10-CM

## 2023-10-23 RX ORDER — CYCLOBENZAPRINE HCL 5 MG
TABLET ORAL
Qty: 30 TABLET | Refills: 0 | Status: SHIPPED | OUTPATIENT
Start: 2023-10-23 | End: 2023-12-14

## 2023-10-23 RX ORDER — MELOXICAM 15 MG/1
TABLET ORAL
Qty: 30 TABLET | Refills: 0 | Status: SHIPPED | OUTPATIENT
Start: 2023-10-23 | End: 2023-12-14

## 2023-10-23 RX ORDER — SERTRALINE HYDROCHLORIDE 100 MG/1
TABLET, FILM COATED ORAL
Qty: 90 TABLET | Refills: 0 | Status: SHIPPED | OUTPATIENT
Start: 2023-10-23 | End: 2024-02-29

## 2023-11-21 ENCOUNTER — APPOINTMENT (OUTPATIENT)
Dept: MEDICAL GROUP | Facility: LAB | Age: 78
End: 2023-11-21
Payer: COMMERCIAL

## 2023-11-29 ENCOUNTER — PATIENT MESSAGE (OUTPATIENT)
Dept: HEALTH INFORMATION MANAGEMENT | Facility: OTHER | Age: 78
End: 2023-11-29

## 2023-12-14 ENCOUNTER — OFFICE VISIT (OUTPATIENT)
Dept: MEDICAL GROUP | Facility: LAB | Age: 78
End: 2023-12-14
Payer: MEDICARE

## 2023-12-14 VITALS
HEIGHT: 63 IN | WEIGHT: 149 LBS | OXYGEN SATURATION: 96 % | DIASTOLIC BLOOD PRESSURE: 60 MMHG | BODY MASS INDEX: 26.4 KG/M2 | RESPIRATION RATE: 16 BRPM | HEART RATE: 72 BPM | TEMPERATURE: 98.2 F | SYSTOLIC BLOOD PRESSURE: 130 MMHG

## 2023-12-14 DIAGNOSIS — F32.A DEPRESSION, UNSPECIFIED DEPRESSION TYPE: ICD-10-CM

## 2023-12-14 DIAGNOSIS — M54.41 CHRONIC RIGHT-SIDED LOW BACK PAIN WITH BILATERAL SCIATICA: ICD-10-CM

## 2023-12-14 DIAGNOSIS — M54.42 CHRONIC RIGHT-SIDED LOW BACK PAIN WITH BILATERAL SCIATICA: ICD-10-CM

## 2023-12-14 DIAGNOSIS — G89.29 CHRONIC RIGHT-SIDED LOW BACK PAIN WITH BILATERAL SCIATICA: ICD-10-CM

## 2023-12-14 DIAGNOSIS — K59.04 CHRONIC IDIOPATHIC CONSTIPATION: ICD-10-CM

## 2023-12-14 DIAGNOSIS — R26.89 IMBALANCE: ICD-10-CM

## 2023-12-14 DIAGNOSIS — R10.12 LEFT UPPER QUADRANT ABDOMINAL PAIN: ICD-10-CM

## 2023-12-14 DIAGNOSIS — C54.1 MALIGNANT NEOPLASM OF ENDOMETRIUM (HCC): ICD-10-CM

## 2023-12-14 DIAGNOSIS — Z13.220 SCREENING FOR HYPERLIPIDEMIA: ICD-10-CM

## 2023-12-14 PROCEDURE — 3078F DIAST BP <80 MM HG: CPT | Performed by: FAMILY MEDICINE

## 2023-12-14 PROCEDURE — 3075F SYST BP GE 130 - 139MM HG: CPT | Performed by: FAMILY MEDICINE

## 2023-12-14 PROCEDURE — 99214 OFFICE O/P EST MOD 30 MIN: CPT | Performed by: FAMILY MEDICINE

## 2023-12-14 RX ORDER — CYCLOBENZAPRINE HCL 5 MG
TABLET ORAL
Qty: 30 TABLET | Refills: 0 | Status: SHIPPED | OUTPATIENT
Start: 2023-12-14 | End: 2024-01-04

## 2023-12-14 RX ORDER — MELOXICAM 15 MG/1
TABLET ORAL
Qty: 30 TABLET | Refills: 0 | Status: SHIPPED | OUTPATIENT
Start: 2023-12-14 | End: 2024-02-05

## 2023-12-14 ASSESSMENT — FIBROSIS 4 INDEX: FIB4 SCORE: 1.79

## 2023-12-14 NOTE — PROGRESS NOTES
Chief Complaint:   Chief Complaint   Patient presents with    Follow-Up       HPI:Established patient   Alice Whitlock is a 78 y.o. female who presents for follow-up, discussed the following today:    1. Malignant neoplasm of endometrium   Chronic, continues to follow-up with her oncologist, patient reports abdominal discomfort as described below, discussed with the patient imaging studies and labs for further evaluation.  She has an appointment to follow-up next month with her oncologist and she will also address it    2. Left upper quadrant abdominal pain  Chronic ongoing, new to me.  Patient said she has been having this left upper quadrant and left hypochondrial discomfort and pain occasionally on and off, for at least 6 months now.  Would like it to be checked out, reports also constipation and episodes of diarrhea when she uses the stool softeners.  Denies nausea or vomiting    3. Depression, unspecified depression type    Chronic ongoing, not well-controlled, on high dose of Zoloft 100 mg daily, patient is having a lot of conflicts and issues with her relationship with her  who she has been  to for 35 years.  But she is having a very complicated relationship, she is feeling now that she is unable to tolerate this relationship because it is causing her a lot of depression and frustration.    4. Imbalance  Chronic ongoing, getting worse recently that she is feeling wobbly and imbalance most of the time.  Especially at night, recently checked her vision and had a cataract surgery.  But in general she said she is always wobbly and falling that she might fall.            Past medical history, family history, social history and medications reviewed and updated in the record. Today   Current medications, problem list and allergies reviewed in [facility] today   Health maintenance topics are reviewed and updated.    Patient Active Problem List    Diagnosis Date Noted    History of uterine cancer 01/06/2022     Malignant neoplasm of endometrium (HCC) 10/06/2023    Recurrent major depressive disorder, in full remission (HCC) 06/06/2023    Atherosclerosis of aorta (HCC) 04/19/2023    Hyperparathyroidism ([facility]) 04/14/2023    Pain of right thumb 09/29/2022    Arthralgia of back 01/20/2022    Thyroid dysfunction 01/06/2022    Neuropathic pain, leg, left 10/02/2018    Primary localized osteoarthrosis of the hip 09/21/2018    Osteoporosis, post-menopausal 09/10/2018    Post-surgical hypothyroidism 09/10/2018    LBBB (left bundle branch block) 10/21/2016    Left knee pain 10/11/2016    FH: CAD (coronary artery disease) 08/19/2015    GERD (gastroesophageal reflux disease) 07/09/2014    Hyperlipidemia 07/09/2014    Mixed stress and urge urinary incontinence 07/09/2014    Chronic depression 07/08/2013    History of total knee arthroplasty 07/08/2013    Total knee replacement status, left 07/08/2013    OA (osteoarthritis) of knee 11/06/2012     Family History   Problem Relation Age of Onset    Heart Disease Other     Cancer Other     Hypertension Other     Cancer Maternal Aunt         breast    Heart Disease Mother      Social History     Socioeconomic History    Marital status:      Spouse name: Not on file    Number of children: Not on file    Years of education: Not on file    Highest education level: Not on file   Occupational History    Not on file   Tobacco Use    Smoking status: Never    Smokeless tobacco: Never   Vaping Use    Vaping Use: Never used   Substance and Sexual Activity    Alcohol use: Not Currently     Comment: 2 a month    Drug use: No    Sexual activity: Yes     Partners: Male     Comment:     Other Topics Concern    Not on file   Social History Narrative    Not on file     Social Determinants of Health     Financial Resource Strain: Not on file   Food Insecurity: Not on file   Transportation Needs: Not on file   Physical Activity: Not on file   Stress: Not on file   Social Connections: Not on  "file   Intimate Partner Violence: Not on file   Housing Stability: Not on file     Current Outpatient Medications   Medication Sig Dispense Refill    sertraline (ZOLOFT) 100 MG Tab Take 1 tablet by mouth once daily 90 Tablet 0    cyclobenzaprine (FLEXERIL) 5 mg tablet TAKE 1 TABLET BY MOUTH ONCE DAILY AS NEEDED FOR MUSCLE SPASM OR  MODERATE  PAIN 30 Tablet 0    meloxicam (MOBIC) 15 MG tablet Take 1 tablet by mouth once daily 30 Tablet 0    gabapentin (NEURONTIN) 300 MG Cap Take 1 capsule by mouth twice daily 180 Capsule 0    rosuvastatin (CRESTOR) 10 MG Tab Take 1 Tablet by mouth every evening. 30 Tablet 11    sennosides-docusate sodium (SENOKOT-S) 8.6-50 MG tablet Take 1 Tablet by mouth every day. 30 Tablet 11    levothyroxine (SYNTHROID) 88 MCG Tab Take 1 tablet by mouth once daily 90 Tablet 3    acetaminophen (TYLENOL) 500 MG Tab Take 1,500 mg by mouth every 6 hours as needed.      prednisoLONE sodium phosphate (INFLAMASE FORTE) 1 % Solution       alendronate (FOSAMAX) 70 MG Tab TAKE 1 TABLET BY MOUTH ONCE A WEEK IN THE MORNING WITH A FULL GLASS OF WATER, 30 MINUTES BEFORE THE FIRST MEAL, BEVERAGE OR MEDICATION OF THE DAY. REMAIN UPRIGHT 4 Tablet 6    diclofenac sodium (VOLTAREN) 1 % Gel       ascorbic acid (VITAMIN C) 1000 MG tablet Take  by mouth.      vitamin E (VITAMIN E) 1000 Unit (450 mg) Cap Take 1,000 Units by mouth every day.      Fluticasone Propionate (FLONASE NA) Administer  into affected nostril(S).      Cyanocobalamin (VITAMIN B-12 PO) Take 1 Tab by mouth every day.      vitamin D, Ergocalciferol, (DRISDOL) 74978 UNITS Cap capsule Take 50,000 Units by mouth every 7 days.       No current facility-administered medications for this visit.           Review Of Systems  As documented in HPI above  PHYSICAL EXAMINATION:    /60 (BP Location: Right arm, Patient Position: Sitting, BP Cuff Size: Adult)   Pulse 72   Temp 36.8 °C (98.2 °F) (Temporal)   Resp 16   Ht 1.6 m (5' 3\")   Wt 67.6 kg (149 lb) "   SpO2 96%   BMI 26.39 kg/m²   Gen.: Well-developed, well-nourished, no apparent distress, pleasant and cooperative with the examination  HEENT: Normocephalic/atraumatic,   Neck: No JVD or bruits, no adenopathy  Cor: Regular rate and rhythm without murmur gallop or rub  Lungs: Clear to auscultation with equal breath sounds bilaterally. No wheezes, rhonchi.  Abdomen: Soft nontender without hepatosplenomegaly or masses appreciated, normoactive bowel sounds  Extremities: No cyanosis, clubbing or edema       ASSESSMENT/Plan:    1. Malignant neoplasm of endometrium (HCC)  Chronic, no concerns, complaining of abdominal pain patient advised to follow-up with her oncologist and I will do imaging studies for further evaluation.  Stable at this time, continues to follow-up with oncology on regular basis, has an appointment in 1 month      2. Left upper quadrant abdominal pain  New concern, ongoing for more than 6 months now.  Advised to do labs and abdominal scanning, benign clinical exam  CBC WITH DIFFERENTIAL    Comp Metabolic Panel    LIPASE    URINALYSIS,CULTURE IF INDICATED    CT-ABDOMEN-PELVIS W/O      3. Depression, unspecified depression type      Chronic not well-controlled, denies red flag symptoms like suicidal ideations, continue Zoloft and follow-up with behavioral health    Referral to Behavioral Health      4. Imbalance  Chronic ongoing, getting worse, imbalance and wobbly sometimes she is fearing to fall, advised and counseled for risk fall assessment.  Follow-up with neurology.  Will send patient for physical therapy, MRI to rule out other causes.  Referral to Physical Therapy    MR-BRAIN-W/O    Referral to Neurology      5. Screening for hyperlipidemia  Lipid Profile         Please note that this dictation was created using voice recognition software. I have made every reasonable attempt to correct obvious errors but there may be errors of grammar and content that I may have overlooked prior to  finalization of this note.

## 2023-12-26 ENCOUNTER — TELEPHONE (OUTPATIENT)
Dept: HEALTH INFORMATION MANAGEMENT | Facility: OTHER | Age: 78
End: 2023-12-26
Payer: COMMERCIAL

## 2024-01-03 DIAGNOSIS — G89.29 CHRONIC RIGHT-SIDED LOW BACK PAIN WITH BILATERAL SCIATICA: ICD-10-CM

## 2024-01-03 DIAGNOSIS — M54.42 CHRONIC RIGHT-SIDED LOW BACK PAIN WITH BILATERAL SCIATICA: ICD-10-CM

## 2024-01-03 DIAGNOSIS — M54.41 CHRONIC RIGHT-SIDED LOW BACK PAIN WITH BILATERAL SCIATICA: ICD-10-CM

## 2024-01-04 ENCOUNTER — TELEPHONE (OUTPATIENT)
Dept: MEDICAL GROUP | Facility: LAB | Age: 79
End: 2024-01-04
Payer: COMMERCIAL

## 2024-01-04 RX ORDER — CYCLOBENZAPRINE HCL 5 MG
TABLET ORAL
Qty: 30 TABLET | Refills: 0 | Status: SHIPPED | OUTPATIENT
Start: 2024-01-04 | End: 2024-02-23

## 2024-01-04 NOTE — TELEPHONE ENCOUNTER
Phone Number Called: [phone] (home)      Call outcome: Left detailed message for patient. Informed to call back with any additional questions.    Message: LVM asking pt to call back before 5pm today to reschedule her appointment on the 9th for later that day

## 2024-01-08 ENCOUNTER — HOSPITAL ENCOUNTER (OUTPATIENT)
Dept: LAB | Facility: MEDICAL CENTER | Age: 79
End: 2024-01-08
Attending: FAMILY MEDICINE
Payer: MEDICARE

## 2024-01-08 DIAGNOSIS — R10.12 LEFT UPPER QUADRANT ABDOMINAL PAIN: ICD-10-CM

## 2024-01-08 DIAGNOSIS — Z13.220 SCREENING FOR HYPERLIPIDEMIA: ICD-10-CM

## 2024-01-08 LAB
APPEARANCE UR: CLEAR
BACTERIA #/AREA URNS HPF: NEGATIVE /HPF
BASOPHILS # BLD AUTO: 2.5 % (ref 0–1.8)
BASOPHILS # BLD: 0.11 K/UL (ref 0–0.12)
BILIRUB UR QL STRIP.AUTO: NEGATIVE
COLOR UR: YELLOW
EOSINOPHIL # BLD AUTO: 0.15 K/UL (ref 0–0.51)
EOSINOPHIL NFR BLD: 3.3 % (ref 0–6.9)
EPI CELLS #/AREA URNS HPF: NEGATIVE /HPF
ERYTHROCYTE [DISTWIDTH] IN BLOOD BY AUTOMATED COUNT: 41.7 FL (ref 35.9–50)
GLUCOSE UR STRIP.AUTO-MCNC: NEGATIVE MG/DL
HCT VFR BLD AUTO: 39.7 % (ref 37–47)
HGB BLD-MCNC: 13.4 G/DL (ref 12–16)
HYALINE CASTS #/AREA URNS LPF: ABNORMAL /LPF
IMM GRANULOCYTES # BLD AUTO: 0.01 K/UL (ref 0–0.11)
IMM GRANULOCYTES NFR BLD AUTO: 0.2 % (ref 0–0.9)
KETONES UR STRIP.AUTO-MCNC: NEGATIVE MG/DL
LEUKOCYTE ESTERASE UR QL STRIP.AUTO: ABNORMAL
LYMPHOCYTES # BLD AUTO: 1.49 K/UL (ref 1–4.8)
LYMPHOCYTES NFR BLD: 33.3 % (ref 22–41)
MCH RBC QN AUTO: 32.3 PG (ref 27–33)
MCHC RBC AUTO-ENTMCNC: 33.8 G/DL (ref 32.2–35.5)
MCV RBC AUTO: 95.7 FL (ref 81.4–97.8)
MICRO URNS: ABNORMAL
MONOCYTES # BLD AUTO: 0.48 K/UL (ref 0–0.85)
MONOCYTES NFR BLD AUTO: 10.7 % (ref 0–13.4)
NEUTROPHILS # BLD AUTO: 2.24 K/UL (ref 1.82–7.42)
NEUTROPHILS NFR BLD: 50 % (ref 44–72)
NITRITE UR QL STRIP.AUTO: NEGATIVE
NRBC # BLD AUTO: 0 K/UL
NRBC BLD-RTO: 0 /100 WBC (ref 0–0.2)
PH UR STRIP.AUTO: 8 [PH] (ref 5–8)
PLATELET # BLD AUTO: 245 K/UL (ref 164–446)
PMV BLD AUTO: 9.1 FL (ref 9–12.9)
PROT UR QL STRIP: NEGATIVE MG/DL
RBC # BLD AUTO: 4.15 M/UL (ref 4.2–5.4)
RBC # URNS HPF: ABNORMAL /HPF
RBC UR QL AUTO: NEGATIVE
SP GR UR STRIP.AUTO: 1.02
UROBILINOGEN UR STRIP.AUTO-MCNC: 0.2 MG/DL
WBC # BLD AUTO: 4.5 K/UL (ref 4.8–10.8)
WBC #/AREA URNS HPF: ABNORMAL /HPF

## 2024-01-08 PROCEDURE — 83690 ASSAY OF LIPASE: CPT

## 2024-01-08 PROCEDURE — 80061 LIPID PANEL: CPT | Mod: GA

## 2024-01-08 PROCEDURE — 81001 URINALYSIS AUTO W/SCOPE: CPT

## 2024-01-08 PROCEDURE — 80053 COMPREHEN METABOLIC PANEL: CPT

## 2024-01-08 PROCEDURE — 85025 COMPLETE CBC W/AUTO DIFF WBC: CPT

## 2024-01-08 PROCEDURE — 36415 COLL VENOUS BLD VENIPUNCTURE: CPT | Mod: GA

## 2024-01-09 LAB
ALBUMIN SERPL BCP-MCNC: 4.4 G/DL (ref 3.2–4.9)
ALBUMIN/GLOB SERPL: 1.5 G/DL
ALP SERPL-CCNC: 79 U/L (ref 30–99)
ALT SERPL-CCNC: 15 U/L (ref 2–50)
ANION GAP SERPL CALC-SCNC: 10 MMOL/L (ref 7–16)
AST SERPL-CCNC: 23 U/L (ref 12–45)
BILIRUB SERPL-MCNC: 0.4 MG/DL (ref 0.1–1.5)
BUN SERPL-MCNC: 15 MG/DL (ref 8–22)
CALCIUM ALBUM COR SERPL-MCNC: 9.8 MG/DL (ref 8.5–10.5)
CALCIUM SERPL-MCNC: 10.1 MG/DL (ref 8.5–10.5)
CHLORIDE SERPL-SCNC: 104 MMOL/L (ref 96–112)
CHOLEST SERPL-MCNC: 160 MG/DL (ref 100–199)
CO2 SERPL-SCNC: 24 MMOL/L (ref 20–33)
CREAT SERPL-MCNC: 0.63 MG/DL (ref 0.5–1.4)
FASTING STATUS PATIENT QL REPORTED: NORMAL
GFR SERPLBLD CREATININE-BSD FMLA CKD-EPI: 91 ML/MIN/1.73 M 2
GLOBULIN SER CALC-MCNC: 3 G/DL (ref 1.9–3.5)
GLUCOSE SERPL-MCNC: 96 MG/DL (ref 65–99)
HDLC SERPL-MCNC: 61 MG/DL
LDLC SERPL CALC-MCNC: 81 MG/DL
LIPASE SERPL-CCNC: 34 U/L (ref 11–82)
POTASSIUM SERPL-SCNC: 4.6 MMOL/L (ref 3.6–5.5)
PROT SERPL-MCNC: 7.4 G/DL (ref 6–8.2)
SODIUM SERPL-SCNC: 138 MMOL/L (ref 135–145)
TRIGL SERPL-MCNC: 88 MG/DL (ref 0–149)

## 2024-01-18 ENCOUNTER — HOSPITAL ENCOUNTER (INPATIENT)
Facility: MEDICAL CENTER | Age: 79
LOS: 4 days | DRG: 543 | End: 2024-01-22
Attending: EMERGENCY MEDICINE | Admitting: INTERNAL MEDICINE
Payer: MEDICARE

## 2024-01-18 ENCOUNTER — APPOINTMENT (OUTPATIENT)
Dept: RADIOLOGY | Facility: MEDICAL CENTER | Age: 79
DRG: 543 | End: 2024-01-18
Attending: EMERGENCY MEDICINE
Payer: MEDICARE

## 2024-01-18 DIAGNOSIS — K21.9 GASTROESOPHAGEAL REFLUX DISEASE WITHOUT ESOPHAGITIS: ICD-10-CM

## 2024-01-18 DIAGNOSIS — M79.2 NEUROPATHIC PAIN, LEG, LEFT: ICD-10-CM

## 2024-01-18 DIAGNOSIS — S32.435A CLOSED NONDISPLACED FRACTURE OF ANTERIOR COLUMN OF LEFT ACETABULUM, INITIAL ENCOUNTER (HCC): ICD-10-CM

## 2024-01-18 DIAGNOSIS — S32.591A CLOSED FRACTURE OF RAMUS OF RIGHT PUBIS, INITIAL ENCOUNTER (HCC): ICD-10-CM

## 2024-01-18 DIAGNOSIS — F32.A CHRONIC DEPRESSION: ICD-10-CM

## 2024-01-18 DIAGNOSIS — M17.10 PRIMARY OSTEOARTHRITIS OF KNEE, UNSPECIFIED LATERALITY: ICD-10-CM
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
## 2024-01-18 DIAGNOSIS — E89.0 POST-SURGICAL HYPOTHYROIDISM: ICD-10-CM

## 2024-01-18 DIAGNOSIS — C54.1 MALIGNANT NEOPLASM OF ENDOMETRIUM (HCC): ICD-10-CM

## 2024-01-18 DIAGNOSIS — S32.409A CLOSED NONDISPLACED FRACTURE OF ACETABULUM, UNSPECIFIED PORTION OF ACETABULUM, UNSPECIFIED LATERALITY, INITIAL ENCOUNTER (HCC): ICD-10-CM

## 2024-01-18 DIAGNOSIS — Z96.652 TOTAL KNEE REPLACEMENT STATUS, LEFT: ICD-10-CM

## 2024-01-18 DIAGNOSIS — M81.0 OSTEOPOROSIS, POST-MENOPAUSAL: ICD-10-CM

## 2024-01-18 DIAGNOSIS — E78.5 HYPERLIPIDEMIA, UNSPECIFIED HYPERLIPIDEMIA TYPE: ICD-10-CM

## 2024-01-18 DIAGNOSIS — S32.591A PUBIC RAMUS FRACTURE, RIGHT, CLOSED, INITIAL ENCOUNTER (HCC): ICD-10-CM

## 2024-01-18 DIAGNOSIS — R11.0 NAUSEA: ICD-10-CM

## 2024-01-18 DIAGNOSIS — Z85.42 HISTORY OF UTERINE CANCER: ICD-10-CM

## 2024-01-18 DIAGNOSIS — S52.502A CLOSED FRACTURE OF DISTAL END OF LEFT RADIUS, UNSPECIFIED FRACTURE MORPHOLOGY, INITIAL ENCOUNTER: ICD-10-CM

## 2024-01-18 DIAGNOSIS — D50.9 IRON DEFICIENCY ANEMIA, UNSPECIFIED IRON DEFICIENCY ANEMIA TYPE: ICD-10-CM

## 2024-01-18 DIAGNOSIS — K59.09 CHRONIC CONSTIPATION: ICD-10-CM

## 2024-01-18 LAB
ALBUMIN SERPL BCP-MCNC: 4.5 G/DL (ref 3.2–4.9)
ALBUMIN/GLOB SERPL: 1.7 G/DL
ALP SERPL-CCNC: 79 U/L (ref 30–99)
ALT SERPL-CCNC: 15 U/L (ref 2–50)
ANION GAP SERPL CALC-SCNC: 12 MMOL/L (ref 7–16)
AST SERPL-CCNC: 22 U/L (ref 12–45)
BASOPHILS # BLD AUTO: 0.8 % (ref 0–1.8)
BASOPHILS # BLD: 0.1 K/UL (ref 0–0.12)
BILIRUB SERPL-MCNC: 0.3 MG/DL (ref 0.1–1.5)
BUN SERPL-MCNC: 19 MG/DL (ref 8–22)
CALCIUM ALBUM COR SERPL-MCNC: 9.8 MG/DL (ref 8.5–10.5)
CALCIUM SERPL-MCNC: 10.2 MG/DL (ref 8.4–10.2)
CHLORIDE SERPL-SCNC: 104 MMOL/L (ref 96–112)
CO2 SERPL-SCNC: 22 MMOL/L (ref 20–33)
CREAT SERPL-MCNC: 0.62 MG/DL (ref 0.5–1.4)
EOSINOPHIL # BLD AUTO: 0.07 K/UL (ref 0–0.51)
EOSINOPHIL NFR BLD: 0.6 % (ref 0–6.9)
ERYTHROCYTE [DISTWIDTH] IN BLOOD BY AUTOMATED COUNT: 41.5 FL (ref 35.9–50)
GFR SERPLBLD CREATININE-BSD FMLA CKD-EPI: 91 ML/MIN/1.73 M 2
GLOBULIN SER CALC-MCNC: 2.7 G/DL (ref 1.9–3.5)
GLUCOSE SERPL-MCNC: 123 MG/DL (ref 65–99)
HCT VFR BLD AUTO: 37 % (ref 37–47)
HGB BLD-MCNC: 12.3 G/DL (ref 12–16)
IMM GRANULOCYTES # BLD AUTO: 0.07 K/UL (ref 0–0.11)
IMM GRANULOCYTES NFR BLD AUTO: 0.6 % (ref 0–0.9)
LIPASE SERPL-CCNC: 37 U/L (ref 11–82)
LYMPHOCYTES # BLD AUTO: 1.56 K/UL (ref 1–4.8)
LYMPHOCYTES NFR BLD: 12.4 % (ref 22–41)
MCH RBC QN AUTO: 31.8 PG (ref 27–33)
MCHC RBC AUTO-ENTMCNC: 33.2 G/DL (ref 32.2–35.5)
MCV RBC AUTO: 95.6 FL (ref 81.4–97.8)
MONOCYTES # BLD AUTO: 0.67 K/UL (ref 0–0.85)
MONOCYTES NFR BLD AUTO: 5.3 % (ref 0–13.4)
NEUTROPHILS # BLD AUTO: 10.07 K/UL (ref 1.82–7.42)
NEUTROPHILS NFR BLD: 80.3 % (ref 44–72)
NRBC # BLD AUTO: 0 K/UL
NRBC BLD-RTO: 0 /100 WBC (ref 0–0.2)
PLATELET # BLD AUTO: 227 K/UL (ref 164–446)
PMV BLD AUTO: 8.6 FL (ref 9–12.9)
POTASSIUM SERPL-SCNC: 4 MMOL/L (ref 3.6–5.5)
PROT SERPL-MCNC: 7.2 G/DL (ref 6–8.2)
RBC # BLD AUTO: 3.87 M/UL (ref 4.2–5.4)
SODIUM SERPL-SCNC: 138 MMOL/L (ref 135–145)
WBC # BLD AUTO: 12.5 K/UL (ref 4.8–10.8)

## 2024-01-18 PROCEDURE — 73110 X-RAY EXAM OF WRIST: CPT | Mod: LT

## 2024-01-18 PROCEDURE — 29125 APPL SHORT ARM SPLINT STATIC: CPT

## 2024-01-18 PROCEDURE — 85025 COMPLETE CBC W/AUTO DIFF WBC: CPT

## 2024-01-18 PROCEDURE — 700102 HCHG RX REV CODE 250 W/ 637 OVERRIDE(OP): Performed by: INTERNAL MEDICINE

## 2024-01-18 PROCEDURE — A9270 NON-COVERED ITEM OR SERVICE: HCPCS | Performed by: INTERNAL MEDICINE

## 2024-01-18 PROCEDURE — 83690 ASSAY OF LIPASE: CPT

## 2024-01-18 PROCEDURE — 99285 EMERGENCY DEPT VISIT HI MDM: CPT

## 2024-01-18 PROCEDURE — 770001 HCHG ROOM/CARE - MED/SURG/GYN PRIV*

## 2024-01-18 PROCEDURE — 99223 1ST HOSP IP/OBS HIGH 75: CPT | Mod: AI | Performed by: INTERNAL MEDICINE

## 2024-01-18 PROCEDURE — 73100 X-RAY EXAM OF WRIST: CPT | Mod: LT

## 2024-01-18 PROCEDURE — 0PSJXZZ REPOSITION LEFT RADIUS, EXTERNAL APPROACH: ICD-10-PCS | Performed by: EMERGENCY MEDICINE

## 2024-01-18 PROCEDURE — 80053 COMPREHEN METABOLIC PANEL: CPT

## 2024-01-18 PROCEDURE — 36415 COLL VENOUS BLD VENIPUNCTURE: CPT

## 2024-01-18 PROCEDURE — 700101 HCHG RX REV CODE 250: Performed by: EMERGENCY MEDICINE

## 2024-01-18 PROCEDURE — 72192 CT PELVIS W/O DYE: CPT

## 2024-01-18 PROCEDURE — 72170 X-RAY EXAM OF PELVIS: CPT

## 2024-01-18 PROCEDURE — 302874 HCHG BANDAGE ACE 2 OR 3""

## 2024-01-18 RX ORDER — POLYETHYLENE GLYCOL 3350 17 G/17G
1 POWDER, FOR SOLUTION ORAL
Status: DISCONTINUED | OUTPATIENT
Start: 2024-01-18 | End: 2024-01-22 | Stop reason: HOSPADM

## 2024-01-18 RX ORDER — ONDANSETRON 2 MG/ML
4 INJECTION INTRAMUSCULAR; INTRAVENOUS EVERY 4 HOURS PRN
Status: DISCONTINUED | OUTPATIENT
Start: 2024-01-18 | End: 2024-01-22 | Stop reason: HOSPADM

## 2024-01-18 RX ORDER — ACETAMINOPHEN 325 MG/1
650 TABLET ORAL EVERY 6 HOURS PRN
Status: DISCONTINUED | OUTPATIENT
Start: 2024-01-18 | End: 2024-01-22 | Stop reason: HOSPADM

## 2024-01-18 RX ORDER — LABETALOL HYDROCHLORIDE 5 MG/ML
10 INJECTION, SOLUTION INTRAVENOUS EVERY 4 HOURS PRN
Status: DISCONTINUED | OUTPATIENT
Start: 2024-01-18 | End: 2024-01-22 | Stop reason: HOSPADM

## 2024-01-18 RX ORDER — ONDANSETRON 4 MG/1
4 TABLET, ORALLY DISINTEGRATING ORAL EVERY 4 HOURS PRN
Status: DISCONTINUED | OUTPATIENT
Start: 2024-01-18 | End: 2024-01-22 | Stop reason: HOSPADM

## 2024-01-18 RX ORDER — OXYCODONE HYDROCHLORIDE 5 MG/1
2.5 TABLET ORAL
Status: DISCONTINUED | OUTPATIENT
Start: 2024-01-18 | End: 2024-01-22 | Stop reason: HOSPADM

## 2024-01-18 RX ORDER — OXYCODONE HYDROCHLORIDE AND ACETAMINOPHEN 5; 325 MG/1; MG/1
1 TABLET ORAL EVERY 6 HOURS PRN
Qty: 12 TABLET | Refills: 0 | Status: SHIPPED | OUTPATIENT
Start: 2024-01-18 | End: 2024-01-21

## 2024-01-18 RX ORDER — MORPHINE SULFATE 4 MG/ML
2 INJECTION INTRAVENOUS
Status: DISCONTINUED | OUTPATIENT
Start: 2024-01-18 | End: 2024-01-22 | Stop reason: HOSPADM

## 2024-01-18 RX ORDER — AMOXICILLIN 250 MG
2 CAPSULE ORAL 2 TIMES DAILY
Status: DISCONTINUED | OUTPATIENT
Start: 2024-01-18 | End: 2024-01-22 | Stop reason: HOSPADM

## 2024-01-18 RX ORDER — BISACODYL 10 MG
10 SUPPOSITORY, RECTAL RECTAL
Status: DISCONTINUED | OUTPATIENT
Start: 2024-01-18 | End: 2024-01-22 | Stop reason: HOSPADM

## 2024-01-18 RX ORDER — LIDOCAINE HYDROCHLORIDE 20 MG/ML
20 INJECTION, SOLUTION INFILTRATION; PERINEURAL ONCE
Status: COMPLETED | OUTPATIENT
Start: 2024-01-18 | End: 2024-01-18

## 2024-01-18 RX ORDER — LEVOTHYROXINE SODIUM 88 UG/1
88 TABLET ORAL DAILY
Status: DISCONTINUED | OUTPATIENT
Start: 2024-01-19 | End: 2024-01-22 | Stop reason: HOSPADM

## 2024-01-18 RX ORDER — GABAPENTIN 300 MG/1
300 CAPSULE ORAL 2 TIMES DAILY
Status: DISCONTINUED | OUTPATIENT
Start: 2024-01-18 | End: 2024-01-22

## 2024-01-18 RX ORDER — MELOXICAM 7.5 MG/1
15 TABLET ORAL DAILY
Status: DISCONTINUED | OUTPATIENT
Start: 2024-01-19 | End: 2024-01-22 | Stop reason: HOSPADM

## 2024-01-18 RX ORDER — ROSUVASTATIN CALCIUM 10 MG/1
10 TABLET, COATED ORAL EVERY EVENING
Status: DISCONTINUED | OUTPATIENT
Start: 2024-01-19 | End: 2024-01-22 | Stop reason: HOSPADM

## 2024-01-18 RX ORDER — OXYCODONE HYDROCHLORIDE 5 MG/1
5 TABLET ORAL
Status: DISCONTINUED | OUTPATIENT
Start: 2024-01-18 | End: 2024-01-22 | Stop reason: HOSPADM

## 2024-01-18 RX ORDER — CYCLOBENZAPRINE HCL 10 MG
5 TABLET ORAL 3 TIMES DAILY PRN
Status: DISCONTINUED | OUTPATIENT
Start: 2024-01-18 | End: 2024-01-22 | Stop reason: HOSPADM

## 2024-01-18 RX ADMIN — GABAPENTIN 300 MG: 300 CAPSULE ORAL at 23:38

## 2024-01-18 RX ADMIN — LIDOCAINE HYDROCHLORIDE 20 ML: 20 INJECTION, SOLUTION INFILTRATION; PERINEURAL at 20:15

## 2024-01-18 RX ADMIN — OXYCODONE HYDROCHLORIDE 2.5 MG: 5 TABLET ORAL at 23:38

## 2024-01-18 ASSESSMENT — ENCOUNTER SYMPTOMS
DEPRESSION: 0
NAUSEA: 0
CHILLS: 0
STRIDOR: 0
DIZZINESS: 0
TINGLING: 0
COUGH: 0
FEVER: 0
FALLS: 1
SPUTUM PRODUCTION: 0
VOMITING: 0
PALPITATIONS: 0
WEAKNESS: 0
DIARRHEA: 0
CONSTIPATION: 0
HEADACHES: 0
SHORTNESS OF BREATH: 0
ABDOMINAL PAIN: 0
LOSS OF CONSCIOUSNESS: 0
MYALGIAS: 0

## 2024-01-18 ASSESSMENT — FIBROSIS 4 INDEX: FIB4 SCORE: 1.87

## 2024-01-18 ASSESSMENT — PAIN DESCRIPTION - PAIN TYPE: TYPE: ACUTE PAIN

## 2024-01-19 PROCEDURE — 99233 SBSQ HOSP IP/OBS HIGH 50: CPT | Performed by: INTERNAL MEDICINE

## 2024-01-19 PROCEDURE — A9270 NON-COVERED ITEM OR SERVICE: HCPCS | Performed by: INTERNAL MEDICINE

## 2024-01-19 PROCEDURE — 97535 SELF CARE MNGMENT TRAINING: CPT

## 2024-01-19 PROCEDURE — 700102 HCHG RX REV CODE 250 W/ 637 OVERRIDE(OP): Performed by: INTERNAL MEDICINE

## 2024-01-19 PROCEDURE — 94760 N-INVAS EAR/PLS OXIMETRY 1: CPT

## 2024-01-19 PROCEDURE — 97162 PT EVAL MOD COMPLEX 30 MIN: CPT

## 2024-01-19 PROCEDURE — 770001 HCHG ROOM/CARE - MED/SURG/GYN PRIV*

## 2024-01-19 RX ORDER — ACETAMINOPHEN 500 MG
1000 TABLET ORAL EVERY 12 HOURS
Status: DISCONTINUED | OUTPATIENT
Start: 2024-01-19 | End: 2024-01-22 | Stop reason: HOSPADM

## 2024-01-19 RX ADMIN — OXYCODONE HYDROCHLORIDE 5 MG: 5 TABLET ORAL at 17:30

## 2024-01-19 RX ADMIN — LEVOTHYROXINE SODIUM 88 MCG: 0.09 TABLET ORAL at 06:17

## 2024-01-19 RX ADMIN — MELOXICAM 15 MG: 7.5 TABLET ORAL at 10:29

## 2024-01-19 RX ADMIN — SERTRALINE HYDROCHLORIDE 100 MG: 50 TABLET ORAL at 10:29

## 2024-01-19 RX ADMIN — ACETAMINOPHEN 1000 MG: 500 TABLET ORAL at 21:48

## 2024-01-19 RX ADMIN — ACETAMINOPHEN 650 MG: 325 TABLET ORAL at 10:38

## 2024-01-19 RX ADMIN — ACETAMINOPHEN 650 MG: 325 TABLET ORAL at 16:23

## 2024-01-19 RX ADMIN — CYCLOBENZAPRINE 5 MG: 10 TABLET, FILM COATED ORAL at 10:39

## 2024-01-19 RX ADMIN — GABAPENTIN 300 MG: 300 CAPSULE ORAL at 10:29

## 2024-01-19 RX ADMIN — ROSUVASTATIN 10 MG: 10 TABLET, FILM COATED ORAL at 21:56

## 2024-01-19 RX ADMIN — DOCUSATE SODIUM 50MG AND SENNOSIDES 8.6MG 2 TABLET: 8.6; 5 TABLET, FILM COATED ORAL at 17:11

## 2024-01-19 RX ADMIN — GABAPENTIN 300 MG: 300 CAPSULE ORAL at 21:47

## 2024-01-19 ASSESSMENT — GAIT ASSESSMENTS
ASSISTIVE DEVICE: PLATFORM WALKER
DEVIATION: STEP TO
DISTANCE (FEET): 50
GAIT LEVEL OF ASSIST: CONTACT GUARD ASSIST

## 2024-01-19 ASSESSMENT — COGNITIVE AND FUNCTIONAL STATUS - GENERAL
MOVING FROM LYING ON BACK TO SITTING ON SIDE OF FLAT BED: A LOT
SUGGESTED CMS G CODE MODIFIER MOBILITY: CK
CLIMB 3 TO 5 STEPS WITH RAILING: A LOT
HELP NEEDED FOR BATHING: A LOT
WALKING IN HOSPITAL ROOM: A LOT
MOVING FROM LYING ON BACK TO SITTING ON SIDE OF FLAT BED: A LITTLE
SUGGESTED CMS G CODE MODIFIER DAILY ACTIVITY: CK
CLIMB 3 TO 5 STEPS WITH RAILING: TOTAL
TURNING FROM BACK TO SIDE WHILE IN FLAT BAD: A LOT
DRESSING REGULAR UPPER BODY CLOTHING: A LITTLE
MOBILITY SCORE: 19
DRESSING REGULAR LOWER BODY CLOTHING: A LOT
STANDING UP FROM CHAIR USING ARMS: A LOT
STANDING UP FROM CHAIR USING ARMS: A LITTLE
MOVING TO AND FROM BED TO CHAIR: A LOT
TOILETING: A LITTLE
SUGGESTED CMS G CODE MODIFIER MOBILITY: CL
DAILY ACTIVITIY SCORE: 18
WALKING IN HOSPITAL ROOM: A LITTLE
MOBILITY SCORE: 11

## 2024-01-19 ASSESSMENT — FIBROSIS 4 INDEX: FIB4 SCORE: 1.93

## 2024-01-19 ASSESSMENT — PAIN DESCRIPTION - PAIN TYPE
TYPE: ACUTE PAIN

## 2024-01-19 ASSESSMENT — ENCOUNTER SYMPTOMS
CHILLS: 0
COUGH: 0
DIZZINESS: 0
PALPITATIONS: 0
NAUSEA: 0
HEADACHES: 0
SHORTNESS OF BREATH: 0
ABDOMINAL PAIN: 0
VOMITING: 0
DIARRHEA: 0
FALLS: 1
CONSTIPATION: 0
BACK PAIN: 0
FEVER: 0

## 2024-01-19 ASSESSMENT — LIFESTYLE VARIABLES
CONSUMPTION TOTAL: NEGATIVE
HAVE PEOPLE ANNOYED YOU BY CRITICIZING YOUR DRINKING: NO
EVER FELT BAD OR GUILTY ABOUT YOUR DRINKING: NO
ALCOHOL_USE: NO
TOTAL SCORE: 0
EVER HAD A DRINK FIRST THING IN THE MORNING TO STEADY YOUR NERVES TO GET RID OF A HANGOVER: NO
AVERAGE NUMBER OF DAYS PER WEEK YOU HAVE A DRINK CONTAINING ALCOHOL: 0
HAVE YOU EVER FELT YOU SHOULD CUT DOWN ON YOUR DRINKING: NO
TOTAL SCORE: 0
HOW MANY TIMES IN THE PAST YEAR HAVE YOU HAD 5 OR MORE DRINKS IN A DAY: 0
ON A TYPICAL DAY WHEN YOU DRINK ALCOHOL HOW MANY DRINKS DO YOU HAVE: 0
TOTAL SCORE: 0

## 2024-01-19 ASSESSMENT — PATIENT HEALTH QUESTIONNAIRE - PHQ9
1. LITTLE INTEREST OR PLEASURE IN DOING THINGS: NOT AT ALL
SUM OF ALL RESPONSES TO PHQ9 QUESTIONS 1 AND 2: 0
2. FEELING DOWN, DEPRESSED, IRRITABLE, OR HOPELESS: NOT AT ALL

## 2024-01-19 NOTE — PROGRESS NOTES
Received report from ER RN. Assumed pt care upon arrival to unit   Pt is A&Ox4, resting comfortably in bed. Transfer board used from [facility] to bed pt tolerated well.   Declines need for pain meds.   Plan of care reviewed for activities and goals this shift. Medication eduction provided.  Pt verbalizes understanding of plan of care for this shift. Patients needs attended well. Fall precautions in place. Bed at lowest position. Call light and personal belongings within reach.   Hourly rounding in progress.  Pt evaluated pt tolerated well   Pt tolerated lunch meal.   Now resting eyes closed in bed.     Pt ambulated to bathroom and back to bed with FWW. SBA.   Pt sat at edge of bed to eat dinner tray.   Pt and spouse updated on plan of care for remainder of shift.   Pt calls - place back to bed after dinner meal. Resting comfortably. No distress noted   DISPLAY PLAN FREE TEXT

## 2024-01-19 NOTE — ASSESSMENT & PLAN NOTE
Post fall  Causing significant pain with movement or standing  Due to this pain, patient is unable to bear weight, certainly unable to ambulate  I scheduled acetaminophen.    Patient requiring oxycodone  I discussed case with PT and OT, they recommended postacute placement  Continue outpatient orthopedic surgery follow-up    Pending SNF vs IRF

## 2024-01-19 NOTE — PROGRESS NOTES
4 Eyes Skin Assessment Completed by NANO Gillespie and NANO Lo.    Head WDL  Ears WDL  Nose WDL  Mouth WDL  Neck WDL  Breast/Chest Scab x 2 small , mid and right upper chest.   Shoulder Blades WDL  Spine WDL  (R) Arm/Elbow/Hand WDL  (L) Arm/Elbow/Hand- cast, ace wrap, CMS intact WDL  Abdomen WDL  Groin WDL  Scrotum/Coccyx/Buttocks WDL  (R) Leg Scar- knee incision scar   (L) Leg Scar-knee incision scar, left hip bruising, small  (R) Heel/Foot/Toe Redness and Blanching dryness no cracks noted  (L) Heel/Foot/Toe WDL - dryness no cracks noted    Devices In Places Pulse Ox      Interventions In Place Pillows    Possible Skin Injury No    Pictures Uploaded Into Epic N/A  Wound Consult Placed N/A  RN Wound Prevention Protocol Ordered No

## 2024-01-19 NOTE — ED NOTES
Pt rounding =previous am meds given at this time. Pt also med per request for 5/10 pain with tylenol and flexiril per same

## 2024-01-19 NOTE — ED NOTES
Pt to the restroom without incident. Instructed to use call light when they are ready to go back to bed.

## 2024-01-19 NOTE — CARE PLAN
The patient is Stable - Low risk of patient condition declining or worsening    Shift Goals  Clinical Goals: monitor labs, pain, fall precautions, increase activity with comfort  Patient Goals: rest    Progress made toward(s) clinical / shift goals:  in progress new admit    Patient is not progressing towards the following goals:

## 2024-01-19 NOTE — DISCHARGE PLANNING
[id number]  DPA received faxed choice form(s). DPA filed choice in Pt Media file. Awaiting MD orders.

## 2024-01-19 NOTE — PROGRESS NOTES
Hospital Medicine Daily Progress Note    Date of Service  1/19/2024    Chief Complaint  Alice Whitlock is a 78 y.o. female admitted 1/18/2024 with   Chief Complaint   Patient presents with    Fall     Tripped over dog   Landed on Left side left wrist took all the impact +deformity +csm  Hx of pelvic frx reports pain able to ambulate w/assistance      Hospital Course  No notes on file    Interval Problem Update  Patient states she has been mild to moderate pain to her left wrist, mild to moderate pain to her right groin and moderate pain to the left inguinal area.  - I discussed case with patient and her  at bedside.  I showed the CT scans for her fractures.  - We discussed at this moment she is on conservative management, no surgery at this time.  We are awaiting PT and OT evaluation.  I explained to them she may need skilled nursing facility if she is unable to be functional enough at home.  Her  had recent cataract surgery and is unable to do any heavy lifting.    I have discussed this patient's plan of care and discharge plan at IDT rounds today with Case Management, Nursing, Nursing leadership, and other members of the IDT team.    Consultants/Specialty  orthopedics    Code Status  Full Code    Disposition  The patient is medically cleared for discharge to home or a post-acute facility.  Anticipate discharge to: skilled nursing facility    I have placed the appropriate orders for post-discharge needs.    Review of Systems  Review of Systems   Constitutional:  Negative for chills, fever and malaise/fatigue.   Respiratory:  Negative for cough and shortness of breath.    Cardiovascular:  Negative for chest pain and palpitations.   Gastrointestinal:  Negative for abdominal pain, constipation, diarrhea, nausea and vomiting.   Musculoskeletal:  Positive for falls and joint pain. Negative for back pain.        Left wrist pain, right groin pain, right left groin pain   Neurological:  Negative for  dizziness and headaches.   All other systems reviewed and are negative.       Physical Exam  Temp:  [36.6 °C (97.9 °F)-37.1 °C (98.7 °F)] 36.9 °C (98.4 °F)  Pulse:  [62-92] 71  Resp:  [14-18] 18  BP: (113-179)/(56-81) 138/69  SpO2:  [92 %-94 %] 93 %    Physical Exam  Vitals and nursing note reviewed.   Constitutional:       General: She is not in acute distress.     Appearance: Normal appearance. She is not ill-appearing.   HENT:      Head: Normocephalic and atraumatic.   Eyes:      General: No scleral icterus.     Extraocular Movements: Extraocular movements intact.   Cardiovascular:      Rate and Rhythm: Normal rate.      Pulses: Normal pulses.      Heart sounds: Normal heart sounds. No murmur heard.  Pulmonary:      Effort: Pulmonary effort is normal. No respiratory distress.      Breath sounds: Normal breath sounds.   Abdominal:      General: Abdomen is flat. Bowel sounds are normal. There is no distension.      Palpations: Abdomen is soft.   Musculoskeletal:         General: Tenderness (Left wrist, right groin and left groin.) present. No swelling. Normal range of motion.      Cervical back: Normal range of motion and neck supple.   Skin:     General: Skin is warm.      Capillary Refill: Capillary refill takes less than 2 seconds.      Coloration: Skin is not jaundiced.      Findings: No erythema.   Neurological:      General: No focal deficit present.      Mental Status: She is alert and oriented to person, place, and time. Mental status is at baseline.      Motor: No weakness.   Psychiatric:         Mood and Affect: Mood normal.         Behavior: Behavior normal.         Thought Content: Thought content normal.         Judgment: Judgment normal.         Fluids    Intake/Output Summary (Last 24 hours) at 1/19/2024 1427  Last data filed at 1/19/2024 1300  Gross per 24 hour   Intake 240 ml   Output --   Net 240 ml       Laboratory  Recent Labs     01/18/24  2255   WBC 12.5*   RBC 3.87*   HEMOGLOBIN 12.3    HEMATOCRIT 37.0   MCV 95.6   MCH 31.8   MCHC 33.2   RDW 41.5   PLATELETCT 227   MPV 8.6*     Recent Labs     01/18/24  2255   SODIUM 138   POTASSIUM 4.0   CHLORIDE 104   CO2 22   GLUCOSE 123*   BUN 19   CREATININE 0.62   CALCIUM 10.2                   Imaging  CT-PELVIS W/O PLUS RECONS   Final Result         1.  Comminuted right symphyseal and superior pubic ramus fracture   2.  Subtle left acetabular anterior column fracture   3.  Intermediate density anterior lateral within the left pelvis, appearance favoring hematoma. Somewhat displaces the bladder to the right.      DX-WRIST-LIMITED 2- LEFT   Final Result         1.  Comminuted impacted distal radial fracture, partially reduced since prior study   2.  Ulnar styloid fracture      DX-WRIST-COMPLETE 3+ LEFT   Final Result         1.  Comminuted impacted distal radial fracture   2.  Ulnar styloid fracture      DX-PELVIS-1 OR 2 VIEWS   Final Result         1.  No acute traumatic bony injury.           Assessment/Plan  * Pubic ramus fracture, right, closed, initial encounter (HCC)- (present on admission)  Assessment & Plan  Post fall  Causing significant pain with movement or standing  Due to this pain, patient is unable to bear weight, certainly unable to ambulate  I scheduled acetaminophen.  Continue as needed opioids.  PT OT evaluation pending  Continue outpatient orthopedic surgery follow-up    Closed fracture of distal end of left radius- (present on admission)  Assessment & Plan  Post fall  Status post reduction and splinting in the ER  Outpatient follow-up with orthopedic surgery  Continue pain regimen  Continue wrist/forearm splint    Closed nondisplaced fracture of anterior column of left acetabulum (HCC)- (present on admission)  Assessment & Plan  Post fall  Currently no significant tenderness, does seem to have good strength and relatively full range of motion  When patient tried to stand she had severe pain and nausea on the right however did not note  pain on the left  ERP did discuss the case with orthopedic surgery who recommended outpatient follow-up    I reviewed her previous DEXA scan, she does have diagnosis of osteoporosis to left femur and lumbar spine on 12/19/2019.  Her fractures due to osteoporosis.  Consider MRI pelvis and femurs if she has ongoing pain out of proportion    Fall- (present on admission)  Assessment & Plan  Patient tripped over a dog she is taking care of.  Resulting in multiple fractures  Nonsyncopal  PT OT pending    Post-surgical hypothyroidism- (present on admission)  Assessment & Plan  Continue Synthroid  Most recent TSH was approximately 1 year ago was normal at 5.16    Hyperlipidemia- (present on admission)  Assessment & Plan  Continue rosuvastatin    Chronic depression- (present on admission)  Assessment & Plan  Continue Zoloft         VTE prophylaxis:   SCDs/TEDs   pharmacologic prophylaxis contraindicated due to Mild hematoma after fracture      I have performed a physical exam and reviewed and updated ROS and Plan today (1/19/2024). In review of yesterday's note (1/18/2024), there are no changes except as documented above.      Total time spent 51 minutes. I spent greater than 50% of the time for patient care, counseling, and coordination on this date, including unit/floor time, and face-to-face time with the patient as per interval events, my own review of patient's imaging and lab analysis and developing my assessment and plan above.

## 2024-01-19 NOTE — ED PROVIDER NOTES
CHIEF COMPLAINT  Chief Complaint   Patient presents with    Fall     Tripped over dog   Landed on Left side left wrist took all the impact +deformity +csm  Hx of pelvic frx reports pain able to ambulate w/assistance        LIMITATION TO HISTORY   None    HPI    Alice Whitlock is a 78 y.o. female who unfortunate history of imbalance that is a chronic as per 's notes below  Here for left wrist pain and bilateral hip pain.  Left wrist pain duration just from the fall earlier today.  It is on the left wrist.  Is worse when she moves but she keeps still it is not rating to the elbow not to the shoulder.  There is no associated numbness or tingling.  Associate deformity    Hip pain is more on the right than the left.  The greater trochanter and inguinal area.  Worse when she flexes it better when she does still no associated numbness or tingling.  And worse when she tries to walk.    Patient 78 years old apparently she tripped over her dog.  Her  who recently had cataract surgery was unable to help.  Patient states that unfortunate history of possible hip fracture that was not diagnosed by x-ray but only CT scan months later has any other pain no head injury no loss of consciousness.  No seizures activity or syncope    OUTSIDE HISTORIAN(S):  None.    EXTERNAL RECORDS REVIEWED           Chief Complaint   Patient presents with    Follow-Up         HPI:Established patient   Alice Whitlock is a 78 y.o. female who presents for follow-up, discussed the following today:     1. Malignant neoplasm of endometrium   Chronic, continues to follow-up with her oncologist, patient reports abdominal discomfort as described below, discussed with the patient imaging studies and labs for further evaluation.  She has an appointment to follow-up next month with her oncologist and she will also address it     2. Left upper quadrant abdominal pain  Chronic ongoing, new to me.  Patient said she has been having this left upper  quadrant and left hypochondrial discomfort and pain occasionally on and off, for at least 6 months now.  Would like it to be checked out, reports also constipation and episodes of diarrhea when she uses the stool softeners.  Denies nausea or vomiting     3. Depression, unspecified depression type     Chronic ongoing, not well-controlled, on high dose of Zoloft 100 mg daily, patient is having a lot of conflicts and issues with her relationship with her  who she has been  to for 35 years.  But she is having a very complicated relationship, she is feeling now that she is unable to tolerate this relationship because it is causing her a lot of depression and frustration.     4. Imbalance  Chronic ongoing, getting worse recently that she is feeling wobbly and imbalance most of the time.  Especially at night, recently checked her vision and had a cataract surgery.  But in general she said she is always wobbly and falling that she might fall.       REVIEW OF SYSTEMS  Noted    PAST MEDICAL HISTORY  Past Medical History:   Diagnosis Date    Arthritis     osteoarthritis    Cancer (HCC) 2015    skin (non melanoma)    Heart burn     occasional    LBBB (left bundle branch block)     no cardiologist     Pain     knee,  joints    Psychiatric problem     depression    Unspecified disorder of thyroid     thyroid nodules=thyroidectomy    Unspecified urinary incontinence        FAMILY HISTORY  Family History   Problem Relation Age of Onset    Heart Disease Other     Cancer Other     Hypertension Other     Cancer Maternal Aunt         breast    Heart Disease Mother        SOCIAL HISTORY  Social History     Tobacco Use    Smoking status: Never    Smokeless tobacco: Never   Vaping Use    Vaping Use: Never used   Substance Use Topics    Alcohol use: Not Currently     Comment: 2 a month    Drug use: No     Social History     Substance and Sexual Activity   Drug Use No       SURGICAL HISTORY  Past Surgical History:   Procedure  Laterality Date    HYSTERECTOMY ROBOTIC XI  1/17/2019    Procedure: HYSTERECTOMY ROBOTIC XI;  Surgeon: Shahid Paez M.D.;  Location: SURGERY [facility];  Service: Gynecology    SALPINGECTOMY Bilateral 1/17/2019    Procedure: SALPINGECTOMY;  Surgeon: Shahid Paez M.D.;  Location: SURGERY [facility];  Service: Gynecology    OOPHORECTOMY Bilateral 1/17/2019    Procedure: OOPHORECTOMY;  Surgeon: Shahid Paez M.D.;  Location: SURGERY [facility];  Service: Gynecology    NODE BIOPSY SENTINEL  1/17/2019    Procedure: NODE BIOPSY SENTINEL;  Surgeon: Shahid Paez M.D.;  Location: SURGERY [facility];  Service: Gynecology    KNEE ARTHROPLASTY TOTAL Left 10/11/2016    Procedure: KNEE ARTHROPLASTY TOTAL;  Surgeon: Beverly Chauhan M.D.;  Location: [facility];  Service:     KNEE ARTHROPLASTY TOTAL  11/6/2012    Performed by Beverly Chauhan M.D. at [facility]    KNEE ARTHROSCOPY  1/18/2011    Performed by MARILEE NAGEL at [facility]    MENISCECTOMY, KNEE, MEDIAL  1/18/2011    Performed by MARILEE NAGEL at [facility]    MENISCECTOMY  1/18/2011    Performed by MARILEE NAGEL at [facility]    SYNOVECTOMY  1/18/2011    Performed by MARILEE NAGEL at [facility]    BLADDER SUSPENSION  2000    THYROIDECTOMY TOTAL  1990    MAMMOPLASTY AUGMENTATION  1981    GA BREAST AUGMENTATION WITH IMPLANT  1980    OTHER SURGICAL PROCEDURE  1979    excision thyroid nodules    LAPAROTOMY  1972    endometriosis    TONSILLECTOMY  1957       CURRENT MEDICATIONS  No current facility-administered medications for this encounter.    Current Outpatient Medications:     cyclobenzaprine (FLEXERIL) 5 mg tablet, TAKE 1 TABLET BY MOUTH ONCE DAILY AS NEEDED FOR MUSCLE SPASM OR  MODERATE  PAIN, Disp: 30 Tablet, Rfl: 0    meloxicam (MOBIC) 15 MG tablet, Take 1 tablet by mouth once daily, Disp: 30 Tablet, Rfl: 0    sertraline (ZOLOFT) 100 MG Tab, Take 1  "tablet by mouth once daily, Disp: 90 Tablet, Rfl: 0    gabapentin (NEURONTIN) 300 MG Cap, Take 1 capsule by mouth twice daily, Disp: 180 Capsule, Rfl: 0    rosuvastatin (CRESTOR) 10 MG Tab, Take 1 Tablet by mouth every evening., Disp: 30 Tablet, Rfl: 11    sennosides-docusate sodium (SENOKOT-S) 8.6-50 MG tablet, Take 1 Tablet by mouth every day., Disp: 30 Tablet, Rfl: 11    levothyroxine (SYNTHROID) 88 MCG Tab, Take 1 tablet by mouth once daily, Disp: 90 Tablet, Rfl: 3    acetaminophen (TYLENOL) 500 MG Tab, Take 1,500 mg by mouth every 6 hours as needed., Disp: , Rfl:     prednisoLONE sodium phosphate (INFLAMASE FORTE) 1 % Solution, , Disp: , Rfl:     alendronate (FOSAMAX) 70 MG Tab, TAKE 1 TABLET BY MOUTH ONCE A WEEK IN THE MORNING WITH A FULL GLASS OF WATER, 30 MINUTES BEFORE THE FIRST MEAL, BEVERAGE OR MEDICATION OF THE DAY. REMAIN UPRIGHT, Disp: 4 Tablet, Rfl: 6    diclofenac sodium (VOLTAREN) 1 % Gel, , Disp: , Rfl:     ascorbic acid (VITAMIN C) 1000 MG tablet, Take  by mouth., Disp: , Rfl:     vitamin E (VITAMIN E) 1000 Unit (450 mg) Cap, Take 1,000 Units by mouth every day., Disp: , Rfl:     Fluticasone Propionate (FLONASE NA), Administer  into affected nostril(S)., Disp: , Rfl:     Cyanocobalamin (VITAMIN B-12 PO), Take 1 Tab by mouth every day., Disp: , Rfl:     vitamin D, Ergocalciferol, (DRISDOL) 48697 UNITS Cap capsule, Take 50,000 Units by mouth every 7 days., Disp: , Rfl:     ALLERGIES  Allergies   Allergen Reactions    Penicillins      Reaction unknown \"fever\"       PHYSICAL EXAM  VITAL SIGNS: BP (!) 179/79   Pulse 80   Temp 36.7 °C (98 °F) (Temporal)   Resp 16   Ht 1.615 m (5' 3.6\")   Wt 65.8 kg (145 lb)   SpO2 94%   BMI 25.20 kg/m²   Reviewed and reviewed high blood pressure noted  Constitutional: Well developed, Well nourished, noted.  HENT: Normocephalic, atraumatic, get of raccoon eyes negative Gimenez sign  Eyes: PERRLA, conjunctiva pink, no scleral icterus.   Cardiovascular: Lower " extremities have pedal pulse that is regular she has a good radial pulse that is also regular.  Respiratory: No respiratory distress no stridor  Abdominal:  Abdomen soft, non-tender, non distended. No rebound, or guarding.    Skin: No erythema, no rash. No wounds or bruising.  Genitourinary: No costovertebral angle tenderness.   Musculoskeletal: Patient appears have a swan-neck deformity of the left wrist.  I am able to flex extend supinate the elbow without difficulty pain no pain to palpation of the left shoulder  Patient has no pain to palpation over the fingers  Patient has been able to flex extend and supinate both her hips but she has pain in the right inguinal area.  No pain ovation of both knees or ankles.  Neurologic: Alert, no facial droop noted. All extra ocular muscles intact. Moves all extremities with out weakness noted  Psychiatric: Affect normal, Judgment normal, Mood normal.         MEDICAL DECISION MAKING:  PROBLEMS EVALUATED THIS VISIT:  Fall.  Likely mechanical.  With unfortunate history of imbalance.  No signs or symptoms suggest syncope or seizure or serious life-threatening etiology  Left wrist deformity suspect fracture.  Neurovascular intact distally.  Bilateral hip pain.  Now with some inguinal pain not reproducible on flexion extension supination of the right.  Again neurovascular intact distally         PLAN:  Strays of both hips  X-ray left wrist.  Hematoma block    RISK:  At this point is high risk she will need to be admitted to the hospital for further workup.        RESULTS    LABS Ordered and Reviewed by Me:        RADIOLOGY        Radiologist interpretation:   CT-PELVIS W/O PLUS RECONS   Final Result         1.  Comminuted right symphyseal and superior pubic ramus fracture   2.  Subtle left acetabular anterior column fracture   3.  Intermediate density anterior lateral within the left pelvis, appearance favoring hematoma. Somewhat displaces the bladder to the right.       DX-WRIST-LIMITED 2- LEFT   Final Result         1.  Comminuted impacted distal radial fracture, partially reduced since prior study   2.  Ulnar styloid fracture      DX-WRIST-COMPLETE 3+ LEFT   Final Result         1.  Comminuted impacted distal radial fracture   2.  Ulnar styloid fracture      DX-PELVIS-1 OR 2 VIEWS   Final Result         1.  No acute traumatic bony injury.            ED COURSE:    ED Observation Status? No   No noted need for observation for developing issue    INTERVENTIONS BY ME:  Medications   lidocaine (Xylocaine) 2 % injection 20 mL (20 mL Other Given 1/18/24 2015)       Jose C block  I discussed with patient risks alternative benefits include infection pain patient was prepped with chlorhexidine.  Injected with 8 cc of 2% plain lidocaine she tolerated procedure well    Response on recheck:  He is pain-free    Reduction the patient had  Traction placed I then extended the wrist and put traction especially in the radius is able to supinate it actually move the wrist and the ulnar fashion.  Then able to flex it while putting thumb at the base of the wrist.  I did feel satisfactory clunk.  Appears to be more aligned and splint is being applied by the technician.    In addition we reviewed the x-ray.  I thought perhaps there is an inferior pubic rami fracture.  Patient has pain over that area on the right and so CT scan of the pelvis will be performed..    CONSULTANTS/OTHER GROUPS CONTACTED    At this point the hospitalist was notified    Spoke to orthopedics.  He states that weight-bear as tolerated she cannot walk she will have to be admitted to the hospitalist with Dr. Green.  In addition spoke to the hospitalist    FINAL DISPO PLAN   Will need to be admitted to the hospital.  I spoke with the hospitalist regarding this.    Is a very pleasant 78-year female.  We were able to reduce her wrist fracture with hematoma block and with improvement.  Patient tolerated well and a splint was applied and  my direct supervision.    I reviewed the CT scan with her and the orthopedic surgeon.  He recommend weight-bear as tolerated.  Unfortunate she cannot weight-bear and the orthopedic said that if she cannot weight-bear she should need to be admitted for observation and therapy.  This point she was ambulated and she cannot ambulate and therefore this should be admitted with the medical team.    FINAL IMPRESSION  1. Fall, initial encounter    2. Closed nondisplaced fracture of acetabulum, unspecified portion of acetabulum, unspecified laterality, initial encounter ([facility])    3. Closed fracture of ramus of right pubis, initial encounter ([facility])    4.  Sling splint application under ED supervision  Hematoma block  Reduction of displaced left wrist fracture

## 2024-01-19 NOTE — ASSESSMENT & PLAN NOTE
Post fall  Status post reduction and splinting in the ER  Outpatient follow-up with orthopedic surgery  Continue pain regimen  Continue LUE splint  I ordered a LUE platform walker DME

## 2024-01-19 NOTE — ASSESSMENT & PLAN NOTE
Patient tripped over a dog she is taking care of.  Resulting in multiple fractures  Nonsyncopal  Pending postacute placement, have placed referrals for SNF, inpatient rehab/physiatry consult    SNF vs IRF, pending referrals  DME PRASHANTH garcia walker pending

## 2024-01-19 NOTE — ED NOTES
Attempted to ambulate pt, pt reports nausea and weakness and states they cannot walk. ERP notified.

## 2024-01-19 NOTE — ASSESSMENT & PLAN NOTE
Post fall  ERP did discuss the case with orthopedic surgery who recommended outpatient follow-up    I reviewed her previous DEXA scan, she does have diagnosis of osteoporosis to left femur and lumbar spine on 12/19/2019.    -- Her acute fractures due to osteoporosis.    Consider MRI pelvis and femurs if she has ongoing pain out of proportion  Pending SNF vs IRF  I ordered a repeat CT scan

## 2024-01-19 NOTE — H&P
Hospital Medicine History & Physical Note    Date of Service  1/18/2024    Primary Care Physician  Denisha Gavin M.D.    Consultants  None    Specialist Names: none    Code Status  Full Code    Chief Complaint  Chief Complaint   Patient presents with    Fall     Tripped over dog   Landed on Left side left wrist took all the impact +deformity +csm  Hx of pelvic frx reports pain able to ambulate w/assistance        History of Presenting Illness  Alice Whitlock is a 78 y.o. female who presented 1/18/2024 with ground-level fall.  Patient states she was in her usual state of health until she tripped over a dog they are watching while their neighbors are on vacation.  She states she landed mostly on the left.  After the fall she did have a left wrist pain as well as right groin pain.  Patient states she has had some right groin pain over the last 2 years after previous fall, no fracture noted then.  I did discuss the case including labs and imaging with the ER physician.    I discussed the plan of care with patient.    Review of Systems  Review of Systems   Constitutional:  Negative for chills, fever and malaise/fatigue.   HENT:  Negative for congestion.    Respiratory:  Negative for cough, sputum production, shortness of breath and stridor.    Cardiovascular:  Negative for chest pain, palpitations and leg swelling.   Gastrointestinal:  Negative for abdominal pain, constipation, diarrhea, nausea and vomiting.   Genitourinary:  Negative for dysuria and urgency.   Musculoskeletal:  Positive for falls and joint pain. Negative for myalgias.   Neurological:  Negative for dizziness, tingling, loss of consciousness, weakness and headaches.   Psychiatric/Behavioral:  Negative for depression and suicidal ideas.    All other systems reviewed and are negative.      Past Medical History   has a past medical history of Arthritis, Cancer (HCC) (2015), Heart burn, LBBB (left bundle branch block), Pain, Psychiatric problem,  "Unspecified disorder of thyroid, and Unspecified urinary incontinence.    Surgical History   has a past surgical history that includes other surgical procedure (1979); thyroidectomy total (1990); bladder suspension (2000); mammoplasty augmentation (1981); laparotomy (1972); tonsillectomy (1957); pr breast augmentation with implant (1980); knee arthroscopy (1/18/2011); meniscectomy, knee, medial (1/18/2011); meniscectomy (1/18/2011); synovectomy (1/18/2011); knee arthroplasty total (11/6/2012); knee arthroplasty total (Left, 10/11/2016); hysterectomy robotic xi (1/17/2019); salpingectomy (Bilateral, 1/17/2019); oophorectomy (Bilateral, 1/17/2019); and node biopsy sentinel (1/17/2019).     Family History  family history includes Cancer in her maternal aunt and another family member; Heart Disease in her mother and another family member; Hypertension in an other family member.   Family history reviewed with patient. There is no family history that is pertinent to the chief complaint.     Social History   reports that she has never smoked. She has never used smokeless tobacco. She reports that she does not currently use alcohol. She reports that she does not use drugs.    Allergies  Allergies   Allergen Reactions    Penicillins      Reaction unknown \"fever\"       Medications  Prior to Admission Medications   Prescriptions Last Dose Informant Patient Reported? Taking?   Cyanocobalamin (VITAMIN B-12 PO) 1/17/2024 at 1500 Patient Yes No   Sig: Take 1 Tab by mouth every day.   Fluticasone Propionate (FLONASE NA) 1/17/2024 at 2000  Yes No   Sig: Administer  into affected nostril(S).   acetaminophen (TYLENOL) 500 MG Tab 1/18/2024 at 2000  Yes No   Sig: Take 1,500 mg by mouth every 6 hours as needed.   alendronate (FOSAMAX) 70 MG Tab 1/10/2024 at 0700  No No   Sig: TAKE 1 TABLET BY MOUTH ONCE A WEEK IN THE MORNING WITH A FULL GLASS OF WATER, 30 MINUTES BEFORE THE FIRST MEAL, BEVERAGE OR MEDICATION OF THE DAY. REMAIN UPRIGHT "   ascorbic acid (VITAMIN C) 1000 MG tablet  at over two weeks  Yes No   Sig: Take  by mouth.   cyclobenzaprine (FLEXERIL) 5 mg tablet 1/17/2024 at 2000  No No   Sig: TAKE 1 TABLET BY MOUTH ONCE DAILY AS NEEDED FOR MUSCLE SPASM OR  MODERATE  PAIN   diclofenac sodium (VOLTAREN) 1 % Gel 1/17/2024 at 2000  Yes No   gabapentin (NEURONTIN) 300 MG Cap 1/17/2024 at 2000  No No   Sig: Take 1 capsule by mouth twice daily   levothyroxine (SYNTHROID) 88 MCG Tab 1/18/2024 at 0800  No No   Sig: Take 1 tablet by mouth once daily   meloxicam (MOBIC) 15 MG tablet 1/18/2024 at 1500  No No   Sig: Take 1 tablet by mouth once daily   prednisoLONE sodium phosphate (INFLAMASE FORTE) 1 % Solution  at more than 2 weeks  Yes No   rosuvastatin (CRESTOR) 10 MG Tab 1/17/2024 at 2000  No No   Sig: Take 1 Tablet by mouth every evening.   sennosides-docusate sodium (SENOKOT-S) 8.6-50 MG tablet 1/18/2024 at 1500  No No   Sig: Take 1 Tablet by mouth every day.   sertraline (ZOLOFT) 100 MG Tab 1/18/2024 at 1500  No No   Sig: Take 1 tablet by mouth once daily   vitamin D, Ergocalciferol, (DRISDOL) 85305 UNITS Cap capsule 1/17/2024 at 1500 Patient Yes No   Sig: Take 50,000 Units by mouth every 7 days.   vitamin E (VITAMIN E) 1000 Unit (450 mg) Cap 1/17/2024 at 1500  Yes No   Sig: Take 1,000 Units by mouth every day.      Facility-Administered Medications: None       Physical Exam  Temp:  [36.6 °C (97.9 °F)-36.7 °C (98 °F)] 36.6 °C (97.9 °F)  Pulse:  [80-92] 86  Resp:  [16] 16  BP: (172-179)/(79-81) 172/81  SpO2:  [93 %-94 %] 94 %  Blood Pressure : (!) 172/81   Temperature: 36.6 °C (97.9 °F)   Pulse: 86   Respiration: 16   Pulse Oximetry: 94 %       Physical Exam  Vitals and nursing note reviewed.   Constitutional:       General: She is not in acute distress.     Appearance: She is well-developed. She is not diaphoretic.   HENT:      Head: Normocephalic and atraumatic.      Right Ear: External ear normal.      Left Ear: External ear normal.      Nose:  "Nose normal. No congestion or rhinorrhea.      Mouth/Throat:      Mouth: Mucous membranes are moist.      Pharynx: No oropharyngeal exudate.   Eyes:      General:         Right eye: No discharge.         Left eye: No discharge.   Neck:      Trachea: No tracheal deviation.   Cardiovascular:      Rate and Rhythm: Normal rate and regular rhythm.      Heart sounds: No murmur heard.     No friction rub. No gallop.   Pulmonary:      Effort: Pulmonary effort is normal. No respiratory distress.      Breath sounds: Normal breath sounds. No stridor. No wheezing or rales.   Chest:      Chest wall: No tenderness.   Abdominal:      General: Bowel sounds are normal. There is no distension.      Palpations: Abdomen is soft.      Tenderness: There is no abdominal tenderness.   Musculoskeletal:         General: Tenderness (right groin) present.      Cervical back: Neck supple.      Right hip: Tenderness present. Decreased range of motion.      Right lower leg: No edema.      Left lower leg: No edema.   Lymphadenopathy:      Cervical: No cervical adenopathy.   Skin:     General: Skin is warm and dry.      Findings: No erythema or rash.   Neurological:      Mental Status: She is alert and oriented to person, place, and time.      Cranial Nerves: No cranial nerve deficit.   Psychiatric:         Mood and Affect: Mood normal.         Behavior: Behavior normal.         Thought Content: Thought content normal.         Judgment: Judgment normal.       Laboratory:          No results for input(s): \"ALTSGPT\", \"ASTSGOT\", \"ALKPHOSPHAT\", \"TBILIRUBIN\", \"DBILIRUBIN\", \"GAMMAGT\", \"AMYLASE\", \"LIPASE\", \"ALB\", \"PREALBUMIN\", \"GLUCOSE\" in the last 72 hours.      No results for input(s): \"NTPROBNP\" in the last 72 hours.      No results for input(s): \"TROPONINT\" in the last 72 hours.    Imaging:  CT-PELVIS W/O PLUS RECONS   Final Result         1.  Comminuted right symphyseal and superior pubic ramus fracture   2.  Subtle left acetabular anterior column " fracture   3.  Intermediate density anterior lateral within the left pelvis, appearance favoring hematoma. Somewhat displaces the bladder to the right.      DX-WRIST-LIMITED 2- LEFT   Final Result         1.  Comminuted impacted distal radial fracture, partially reduced since prior study   2.  Ulnar styloid fracture      DX-WRIST-COMPLETE 3+ LEFT   Final Result         1.  Comminuted impacted distal radial fracture   2.  Ulnar styloid fracture      DX-PELVIS-1 OR 2 VIEWS   Final Result         1.  No acute traumatic bony injury.          X-Ray:  I have personally reviewed the images and compared with prior images.    Assessment/Plan:  Justification for Admission Status  I anticipate this patient will require at least two midnights for appropriate medical management, necessitating inpatient admission because comminuted right symphyseal and superior pubic ramus fracture, subtle left acetabular anterior column fracture causing inability to ambulate    Patient will need a Med/Surg bed on MEDICAL service .  The need is secondary to fall with fractures and inability to ambulate.    * Pubic ramus fracture, right, closed, initial encounter (HCC)- (present on admission)  Assessment & Plan  Post fall  Causing significant pain with movement or standing  Due to this pain, patient is unable to bear weight, certainly unable to ambulate  Start pain meds  Await PT/OT  Outpatient follow-up with orthopedic surgery    Closed fracture of distal end of left radius- (present on admission)  Assessment & Plan  Post fall  Status post reduction and splinting in the ER  Outpatient follow-up with orthopedic surgery  Pain management    Closed nondisplaced fracture of anterior column of left acetabulum (HCC)- (present on admission)  Assessment & Plan  Post fall  Currently no significant tenderness, does seem to have good strength and relatively full range of motion  When patient tried to stand she had severe pain and nausea on the right however  did not note pain on the left  ERP did discuss the case with orthopedic surgery who recommended outpatient follow-up  May need additional imaging if she does have worsening pain or is unable to ambulate due to pain on the left    Fall- (present on admission)  Assessment & Plan  Resulting in multiple fractures  Nonsyncopal, tripped over her dog  Currently is unable to ambulate due to fractures  Await PT/OT    Post-surgical hypothyroidism- (present on admission)  Assessment & Plan  Continue home Synthroid at 88 mcg daily  Most recent TSH was approximately 1 year ago was normal at 5.16    Hyperlipidemia- (present on admission)  Assessment & Plan  Continue home statin    Chronic depression- (present on admission)  Assessment & Plan  Continue home Zoloft        VTE prophylaxis: SCDs/TEDs

## 2024-01-19 NOTE — ED TRIAGE NOTES
Tripped over dog   Landed on Left side left wrist took all the impact +deformity +csm   Hx of pelvic frx reports pain able to ambulate w/assistance

## 2024-01-19 NOTE — ED NOTES
Pt moved up in bed resting comfortably call light wChantelin reach   Bed in lowest position pt to remain in ED due to no beds available

## 2024-01-19 NOTE — ED NOTES
Breakfast tray previously provided. Pt rounding, vs as charted, , left arm splinted and at 90 degree angle on side rail. Pt c/o left side pain when questioned, though denies need for interventions at this time. Call light in reach, denies needs

## 2024-01-20 PROBLEM — N94.89 PELVIC HEMATOMA, FEMALE: Status: ACTIVE | Noted: 2024-01-20

## 2024-01-20 PROCEDURE — 700102 HCHG RX REV CODE 250 W/ 637 OVERRIDE(OP): Performed by: INTERNAL MEDICINE

## 2024-01-20 PROCEDURE — 94760 N-INVAS EAR/PLS OXIMETRY 1: CPT

## 2024-01-20 PROCEDURE — A9270 NON-COVERED ITEM OR SERVICE: HCPCS | Performed by: INTERNAL MEDICINE

## 2024-01-20 PROCEDURE — 97116 GAIT TRAINING THERAPY: CPT

## 2024-01-20 PROCEDURE — 97530 THERAPEUTIC ACTIVITIES: CPT

## 2024-01-20 PROCEDURE — 97535 SELF CARE MNGMENT TRAINING: CPT

## 2024-01-20 PROCEDURE — 97166 OT EVAL MOD COMPLEX 45 MIN: CPT

## 2024-01-20 PROCEDURE — 99233 SBSQ HOSP IP/OBS HIGH 50: CPT | Performed by: INTERNAL MEDICINE

## 2024-01-20 PROCEDURE — 770001 HCHG ROOM/CARE - MED/SURG/GYN PRIV*

## 2024-01-20 RX ADMIN — MELOXICAM 15 MG: 7.5 TABLET ORAL at 05:58

## 2024-01-20 RX ADMIN — GABAPENTIN 300 MG: 300 CAPSULE ORAL at 05:58

## 2024-01-20 RX ADMIN — ROSUVASTATIN 10 MG: 10 TABLET, FILM COATED ORAL at 17:13

## 2024-01-20 RX ADMIN — ACETAMINOPHEN 1000 MG: 500 TABLET ORAL at 05:58

## 2024-01-20 RX ADMIN — OXYCODONE HYDROCHLORIDE 5 MG: 5 TABLET ORAL at 17:15

## 2024-01-20 RX ADMIN — LEVOTHYROXINE SODIUM 88 MCG: 0.09 TABLET ORAL at 05:58

## 2024-01-20 RX ADMIN — OXYCODONE HYDROCHLORIDE 2.5 MG: 5 TABLET ORAL at 21:39

## 2024-01-20 RX ADMIN — DOCUSATE SODIUM 50MG AND SENNOSIDES 8.6MG 2 TABLET: 8.6; 5 TABLET, FILM COATED ORAL at 17:13

## 2024-01-20 RX ADMIN — ACETAMINOPHEN 1000 MG: 500 TABLET ORAL at 17:14

## 2024-01-20 RX ADMIN — GABAPENTIN 300 MG: 300 CAPSULE ORAL at 17:15

## 2024-01-20 RX ADMIN — OXYCODONE HYDROCHLORIDE 5 MG: 5 TABLET ORAL at 06:04

## 2024-01-20 RX ADMIN — SERTRALINE HYDROCHLORIDE 100 MG: 50 TABLET ORAL at 05:58

## 2024-01-20 RX ADMIN — DOCUSATE SODIUM 50MG AND SENNOSIDES 8.6MG 2 TABLET: 8.6; 5 TABLET, FILM COATED ORAL at 05:58

## 2024-01-20 RX ADMIN — OXYCODONE HYDROCHLORIDE 5 MG: 5 TABLET ORAL at 10:24

## 2024-01-20 ASSESSMENT — COGNITIVE AND FUNCTIONAL STATUS - GENERAL
EATING MEALS: A LITTLE
DRESSING REGULAR LOWER BODY CLOTHING: A LOT
TOILETING: A LITTLE
HELP NEEDED FOR BATHING: A LOT
DAILY ACTIVITIY SCORE: 16
PERSONAL GROOMING: A LITTLE
DRESSING REGULAR UPPER BODY CLOTHING: A LITTLE
SUGGESTED CMS G CODE MODIFIER DAILY ACTIVITY: CK

## 2024-01-20 ASSESSMENT — ENCOUNTER SYMPTOMS
ABDOMINAL PAIN: 0
SHORTNESS OF BREATH: 0
COUGH: 0
CHILLS: 0
FALLS: 1
BACK PAIN: 0
CONSTIPATION: 0
VOMITING: 0
DIARRHEA: 0
NAUSEA: 0
FEVER: 0
DIZZINESS: 0
HEADACHES: 0
PALPITATIONS: 0

## 2024-01-20 ASSESSMENT — GAIT ASSESSMENTS
ASSISTIVE DEVICE: PLATFORM WALKER
GAIT LEVEL OF ASSIST: CONTACT GUARD ASSIST
DISTANCE (FEET): 15
DEVIATION: STEP TO;ANTALGIC
DISTANCE (FEET): 50

## 2024-01-20 ASSESSMENT — PAIN DESCRIPTION - PAIN TYPE
TYPE: ACUTE PAIN

## 2024-01-20 ASSESSMENT — ACTIVITIES OF DAILY LIVING (ADL): TOILETING: INDEPENDENT

## 2024-01-20 NOTE — ASSESSMENT & PLAN NOTE
Minimal seen on CT scan.  Due to acute fall at home.  Patient is not on anticoagulation  Monitoring, hemoglobin 12.3 from 13.4  Hgb returned 8.5 at night and 8.7 on repeat today, drastic decline. Pt agreed for CT chest, CTA A/P, I placed STAT orders.

## 2024-01-20 NOTE — THERAPY
Physical Therapy   Initial Evaluation     Patient Name: Alice Whitlock  Age:  78 y.o., Sex:  female  Medical Record #: [id number]  Today's Date: 1/19/2024          Assessment  Patient is 78 y.o. female with a diagnosis of Pubic ramus FX,wrist FX L Pt lives at home with  and is active. is not able to provide much assistance.Acute PT needed to improve bed mob,transfers and ambulation    Plan    Physical Therapy Initial Treatment Plan   Treatment Plan : (P) Bed Mobility, Gait Training, Neuro Re-Education / Balance, Therapeutic Activities, Therapeutic Exercise  Treatment Frequency: (P) Daily    DC Equipment Recommendations: (P)  (platform walker)  Discharge Recommendations: (P)  (Home V SNF depending on progress)        Objective       01/19/24 1554   Charge Group   PT Evaluation PT Evaluation Mod   PT Self Care / Home Evaluation (Units) 1   Total Time Spent   PT Evaluation Time Spent (Mins) 30   Initial Contact Note    Initial Contact Note Order Received and Verified, Physical Therapy Evaluation in Progress with Full Report to Follow.   Vitals   Pulse 73   Blood Pressure  125/64   Respiration 18   Pulse Oximetry 92 %   O2 Delivery Device None - Room Air   Prior Living Situation   Prior Services Home-Independent   Housing / Facility [address]   Steps Into Home 0   Steps In Home 0   Equipment Owned Single Point Cane;Crutches   Lives with - Patient's Self Care Capacity Spouse   Prior Level of Functional Mobility   Bed Mobility Independent   Transfer Status Independent   Ambulation Independent   Ambulation Distance community   Assistive Devices Used None   Stairs Independent   History of Falls   History of Falls Yes   Cognition    Cognition / Consciousness WDL   Passive ROM Lower Body   Passive ROM Lower Body WDL   Active ROM Lower Body    Active ROM Lower Body  WDL   Strength Lower Body   Comments decreased L LE   Coordination Lower Body    Coordination Lower Body  WDL   Balance Assessment   Sitting  Balance (Static) Good   Sitting Balance (Dynamic) Fair +   Standing Balance (Static) Fair +   Standing Balance (Dynamic) Fair   Weight Shift Sitting Fair   Weight Shift Standing Fair   Bed Mobility    Supine to Sit Modified Independent   Sit to Supine Modified Independent   Scooting Modified Independent   Gait Analysis   Gait Level Of Assist Contact Guard Assist   Assistive Device Platform Walker   Distance (Feet) 50   # of Times Distance was Traveled 1   Deviation Step To   # of Stairs Climbed 0   Weight Bearing Status WBAT L LE   Functional Mobility   Sit to Stand Contact Guard Assist   Bed, Chair, Wheelchair Transfer Contact Guard Assist   Transfer Method Stand Step   How much difficulty does the patient currently have...   Turning over in bed (including adjusting bedclothes, sheets and blankets)? 4   Sitting down on and standing up from a chair with arms (e.g., wheelchair, bedside commode, etc.) 3   Moving from lying on back to sitting on the side of the bed? 4   How much help from another person does the patient currently need...   Moving to and from a bed to a chair (including a wheelchair)? 3   Need to walk in a hospital room? 3   Climbing 3-5 steps with a railing? 2   6 clicks Mobility Score 19   Activity Tolerance   Sitting Edge of Bed 10   Standing 7   Patient / Family Goals    Patient / Family Goal #1 not stated   Short Term Goals    Short Term Goal # 1 I bed mob in 4 V   Short Term Goal # 2 I transfers in 4 V   Short Term Goal # 3 S ambulation x 100 feet in 4 V   Physical Therapy Initial Treatment Plan    Treatment Plan  Bed Mobility;Gait Training;Neuro Re-Education / Balance;Therapeutic Activities;Therapeutic Exercise   Treatment Frequency Daily   Problem List    Problems Impaired Transfers;Impaired Ambulation;Functional Strength Deficit   Anticipated Discharge Equipment and Recommendations   DC Equipment Recommendations   (platform walker)   Discharge Recommendations   (Home V SNF depending on  progress)   Interdisciplinary Plan of Care Collaboration   IDT Collaboration with  Nursing   Session Information   Date / Session Number  1/19-1 1/7 1/25   Priority 4

## 2024-01-20 NOTE — DISCHARGE PLANNING
Met with pt and . She reports that pta she was independent with ADLs and IADLs. She reports still working part time. She would prefer Home Health Services and chose [facility]. Choice faxed to [facility]. Notified  that pt prefers  services.    PCP is Dr. Denisha Gavin.       Care Transition Team Assessment    Information Source  Orientation Level: Oriented X4  Information Given By: Patient  Who is responsible for making decisions for patient? : Patient    Readmission Evaluation  Is this a readmission?: No    Elopement Risk  Legal Hold: No  Ambulatory or Self Mobile in Wheelchair: No-Not an Elopement Risk    Interdisciplinary Discharge Planning  Does Admitting Nurse Feel This Could be a Complex Discharge?: No  Primary Care Physician: Dr Denisha Gavin  Lives with - Patient's Self Care Capacity: Spouse  Patient or legal guardian wants to designate a caregiver: Yes  Caregiver name: Yovany Whitlock  Caregiver contact info: [phone]  ([facility] [place]) Authorization for Release of Health Information has been completed: Yes  Support Systems: Spouse / Significant Other  Housing / Facility: [address]  Do You Take your Prescribed Medications Regularly: Yes  Able to Return to Previous ADL's: Future Time w/Therapy  Mobility Issues: No  Prior Services: Home-Independent  Patient Prefers to be Discharged to:: Homehealth vs SNF  Assistance Needed: No  Durable Medical Equipment: Not Applicable    Discharge Preparedness  What is your plan after discharge?: Home health care  What are your discharge supports?: Spouse  Prior Functional Level: Ambulatory, Drives Self, Independent with Activities of Daily Living, Independent with Medication Management  Difficulity with ADLs: Bathing, Dressing, Toileting, Walking  Difficulity with IADLs: Cooking, Driving, Laundry, Shopping    Functional Assesment  Prior Functional Level: Ambulatory, Drives Self, Independent with Activities of Daily Living, Independent with Medication  Management    Finances  Financial Barriers to Discharge: No  Prescription Coverage: Yes    Vision / Hearing Impairment  Vision Impairment : Yes  Right Eye Vision: Wears Glasses  Left Eye Vision: Wears Glasses  Hearing Impairment : No    Values / Beliefs / Concerns  Values / Beliefs Concerns : No    Advance Directive  Advance Directive?: None    Domestic Abuse  Have you ever been the victim of abuse or violence?: No  Physical Abuse or Sexual Abuse: No  Verbal Abuse or Emotional Abuse: No  Possible Abuse/Neglect Reported to:: Not Applicable         Discharge Risks or Barriers  Discharge risks or barriers?: Post-acute placement / services  Patient risk factors: Cognitive / sensory / physical deficit, Complex medical needs    Anticipated Discharge Information  Discharge Disposition: D/T to home under A care in anticipation of covered skilled care (06)

## 2024-01-20 NOTE — PROGRESS NOTES
Received report from night shift RN, assumed care of pt at 0715. Pt sleeping in bed, unlabored breathing noted. No signs of distress. Call light within reach and safety precautions in place.    0950: Pt sitting up in bed eating breakfast. AAOx4, VSS on room air. Pt reports pain 6/10 with movement, will verify MAR. Denies nausea. Updated on plan of care including PT/OT today. Pt educated to call for assistance.

## 2024-01-20 NOTE — PROGRESS NOTES
Hospital Medicine Daily Progress Note    Date of Service  1/20/2024    Chief Complaint  Alice Whitlock is a 78 y.o. female admitted 1/18/2024 with   Chief Complaint   Patient presents with    Fall     Tripped over dog   Landed on Left side left wrist took all the impact +deformity +csm  Hx of pelvic frx reports pain able to ambulate w/assistance      Hospital Course  No notes on file    Interval Problem Update  1/19:  Patient states she has been mild to moderate pain to her left wrist, mild to moderate pain to her right groin and moderate pain to the left inguinal area.  - I discussed case with patient and her  at bedside.  I showed the CT scans for her fractures.  - We discussed at this moment she is on conservative management, no surgery at this time.  We are awaiting PT and OT evaluation.  I explained to them she may need skilled nursing facility if she is unable to be functional enough at home.  Her  had recent cataract surgery and is unable to do any heavy lifting.    1/20:  Patient stated her pain was moderate, but being controlled with oxycodone.  She does not think her  can take care of her at the moment.  I discussed with PT and OT, they are recommending postacute placement.    I have discussed this patient's plan of care and discharge plan at IDT rounds today with Case Management, Nursing, Nursing leadership, and other members of the IDT team.    Consultants/Specialty  orthopedics    Code Status  Full Code    Disposition  The patient is medically cleared for discharge to home or a post-acute facility.  Anticipate discharge to: an inpatient rehabilitation hospital    I have placed the appropriate orders for post-discharge needs.    Review of Systems  Review of Systems   Constitutional:  Negative for chills, fever and malaise/fatigue.   Respiratory:  Negative for cough and shortness of breath.    Cardiovascular:  Negative for chest pain and palpitations.   Gastrointestinal:  Negative  for abdominal pain, constipation, diarrhea, nausea and vomiting.   Musculoskeletal:  Positive for falls and joint pain. Negative for back pain.        Left wrist pain, right groin pain, right left groin pain   Neurological:  Negative for dizziness and headaches.   All other systems reviewed and are negative.       Physical Exam  Temp:  [36.8 °C (98.2 °F)-37.1 °C (98.8 °F)] 36.8 °C (98.2 °F)  Pulse:  [73-86] 83  Resp:  [16-18] 16  BP: (116-131)/(58-64) 116/61  SpO2:  [91 %-92 %] 91 %    Physical Exam  Vitals and nursing note reviewed.   Constitutional:       General: She is not in acute distress.     Appearance: Normal appearance. She is not ill-appearing.   HENT:      Head: Normocephalic and atraumatic.   Eyes:      General: No scleral icterus.     Extraocular Movements: Extraocular movements intact.   Cardiovascular:      Rate and Rhythm: Normal rate.      Pulses: Normal pulses.      Heart sounds: Normal heart sounds. No murmur heard.  Pulmonary:      Effort: Pulmonary effort is normal. No respiratory distress.      Breath sounds: Normal breath sounds.   Abdominal:      General: Abdomen is flat. Bowel sounds are normal. There is no distension.      Palpations: Abdomen is soft.   Musculoskeletal:         General: Tenderness (Left wrist, right groin and left groin.) present. No swelling. Normal range of motion.      Cervical back: Normal range of motion and neck supple.   Skin:     General: Skin is warm.      Capillary Refill: Capillary refill takes less than 2 seconds.      Coloration: Skin is not jaundiced.      Findings: No erythema.   Neurological:      General: No focal deficit present.      Mental Status: She is alert and oriented to person, place, and time. Mental status is at baseline.      Motor: No weakness.   Psychiatric:         Mood and Affect: Mood normal.         Behavior: Behavior normal.         Thought Content: Thought content normal.         Judgment: Judgment normal.          Fluids    Intake/Output Summary (Last 24 hours) at 1/20/2024 1534  Last data filed at 1/20/2024 1024  Gross per 24 hour   Intake 600 ml   Output 1000 ml   Net -400 ml       Laboratory  Recent Labs     01/18/24  2255   WBC 12.5*   RBC 3.87*   HEMOGLOBIN 12.3   HEMATOCRIT 37.0   MCV 95.6   MCH 31.8   MCHC 33.2   RDW 41.5   PLATELETCT 227   MPV 8.6*     Recent Labs     01/18/24  2255   SODIUM 138   POTASSIUM 4.0   CHLORIDE 104   CO2 22   GLUCOSE 123*   BUN 19   CREATININE 0.62   CALCIUM 10.2                   Imaging  CT-PELVIS W/O PLUS RECONS   Final Result         1.  Comminuted right symphyseal and superior pubic ramus fracture   2.  Subtle left acetabular anterior column fracture   3.  Intermediate density anterior lateral within the left pelvis, appearance favoring hematoma. Somewhat displaces the bladder to the right.      DX-WRIST-LIMITED 2- LEFT   Final Result         1.  Comminuted impacted distal radial fracture, partially reduced since prior study   2.  Ulnar styloid fracture      DX-WRIST-COMPLETE 3+ LEFT   Final Result         1.  Comminuted impacted distal radial fracture   2.  Ulnar styloid fracture      DX-PELVIS-1 OR 2 VIEWS   Final Result         1.  No acute traumatic bony injury.           Assessment/Plan  * Pubic ramus fracture, right, closed, initial encounter (HCC)- (present on admission)  Assessment & Plan  Post fall  Causing significant pain with movement or standing  Due to this pain, patient is unable to bear weight, certainly unable to ambulate  I scheduled acetaminophen.    Patient requiring oxycodone  I discussed case with PT and OT, they recommended postacute placement  Continue outpatient orthopedic surgery follow-up    Pelvic hematoma, female- (present on admission)  Assessment & Plan  Minimal seen on CT scan.  Due to acute fall at home.  Patient is not on anticoagulation  Monitoring, hemoglobin 12.3 from 13.4.  I ordered repeat CBC in the morning    Closed fracture of distal  end of left radius- (present on admission)  Assessment & Plan  Post fall  Status post reduction and splinting in the ER  Outpatient follow-up with orthopedic surgery  Continue pain regimen  Continue wrist/forearm splint    Closed nondisplaced fracture of anterior column of left acetabulum (HCC)- (present on admission)  Assessment & Plan  Post fall  Currently no significant tenderness, does seem to have good strength and relatively full range of motion  When patient tried to stand she had severe pain and nausea on the right however did not note pain on the left  ERP did discuss the case with orthopedic surgery who recommended outpatient follow-up    I reviewed her previous DEXA scan, she does have diagnosis of osteoporosis to left femur and lumbar spine on 12/19/2019.    -- Her acute fractures due to osteoporosis.  Consider MRI pelvis and femurs if she has ongoing pain out of proportion    Fall- (present on admission)  Assessment & Plan  Patient tripped over a dog she is taking care of.  Resulting in multiple fractures  Nonsyncopal  Pending postacute placement, have placed referrals for SNF, inpatient rehab/physiatry consult    Post-surgical hypothyroidism- (present on admission)  Assessment & Plan  Continue home Synthroid  Most recent TSH was approximately 1 year ago was normal at 5.16    Hyperlipidemia- (present on admission)  Assessment & Plan   continue home rosuvastatin    Chronic depression- (present on admission)  Assessment & Plan  Continue home Zoloft         VTE prophylaxis:   SCDs/TEDs   pharmacologic prophylaxis contraindicated due to Hematoma after fall      I have performed a physical exam and reviewed and updated ROS and Plan today (1/20/2024). In review of yesterday's note (1/19/2024), there are no changes except as documented above.      Total time spent 52 mintues.    This included my review of patient's overnight RN notes, face to face interview, physical examination, lab analysis.  Also includes  my documented assessments and interventions above.  I spoke with specialist PT/OT.  In addition, I spoke with entire care team on patient's treatment plan and DC planning on IDT rounds.

## 2024-01-20 NOTE — PROGRESS NOTES
Received report from day shift nurse. Assumed pt care. Pt is A&Ox4, resting comfortably in bed. No signs of SOB/respiratory distress. Patient reports pain of 3/10, denies the need for pain meds at this time. Went over plan of night with patient. Fall precautions in place. Bed at lowest position. Call light and personal belongings within reach. No further needs at this time.

## 2024-01-20 NOTE — CARE PLAN
The patient is Stable - Low risk of patient condition declining or worsening    Shift Goals  Clinical Goals: Pt will remain free from falls throughout shift, pt will report pain less than 4/20 post intervention  Patient Goals: rest    Progress made toward(s) clinical / shift goals:  ***    Patient is not progressing towards the following goals:

## 2024-01-20 NOTE — THERAPY
Physical Therapy   Daily Treatment     Patient Name: Alice Whitlock  Age:  78 y.o., Sex:  female  Medical Record #: [id number]  Today's Date: 1/20/2024     Precautions  Precautions: Fall Risk;Weight Bearing As Tolerated Right Lower Extremity;Weight Bearing As Tolerated Left Lower Extremity;Non Weight Bearing Left Upper Extremity;Platform Weight Bearing Left Upper Extremity  Comments: ok to use PFFWW with LUE    Assessment    Pt is improving but feels she will not get enougth help from  at home    Plan    Treatment Plan Status:    Type of Treatment: Bed Mobility, Gait Training, Neuro Re-Education / Balance, Therapeutic Activities, Therapeutic Exercise  Treatment Frequency: Daily  Treatment Duration:      DC Equipment Recommendations:  (platform walker)  Discharge Recommendations: (P) Recommend post-acute placement for additional physical therapy services prior to discharge home       Objective       01/20/24 1300   Charge Group   PT Gait Training (Units) 1   PT Therapeutic Activities (Units) 1   Sitting Lower Body Exercises   Sitting Lower Body Exercises Yes   Balance   Sitting Balance (Static) Good   Sitting Balance (Dynamic) Good   Standing Balance (Static) Fair   Standing Balance (Dynamic) Fair   Weight Shift Sitting Fair   Weight Shift Standing Fair   Gait Analysis   Gait Level Of Assist Contact Guard Assist   Assistive Device Platform Walker   Distance (Feet) 50   # of Times Distance was Traveled 1   Deviation Step To;Antalgic   Functional Mobility   Sit to Stand Contact Guard Assist   Bed, Chair, Wheelchair Transfer Contact Guard Assist   Transfer Method Stand Step   Activity Tolerance   Sitting in Chair > 1hr   Standing 8   Short Term Goals    Short Term Goal # 1 I bed mob in 4 V   Goal Outcome # 1 Progressing as expected   Short Term Goal # 2 I transfers in 4 V   Goal Outcome # 2 Progressing as expected   Short Term Goal # 3 S ambulation x 100 feet in 4 V   Goal Outcome # 3 Progressing as expected    Anticipated Discharge Equipment and Recommendations   Discharge Recommendations Recommend post-acute placement for additional physical therapy services prior to discharge home   Interdisciplinary Plan of Care Collaboration   IDT Collaboration with  Nursing   Session Information   Date / Session Number  1/20-2 2/7 1/25

## 2024-01-20 NOTE — THERAPY
"Occupational Therapy   Initial Evaluation     Patient Name: Alice Whitlock  Age:  78 y.o., Sex:  female  Medical Record #: [id number]  Today's Date: 1/20/2024     Precautions  Precautions: Fall Risk, Weight Bearing As Tolerated Right Lower Extremity, Weight Bearing As Tolerated Left Lower Extremity, Non Weight Bearing Left Upper Extremity, Platform Weight Bearing Left Upper Extremity  Comments: ok to use PFFWW with LUE    Assessment  Patient is 78 y.o. female with a diagnosis of Pubic ramus fracture, nondisplaced fracture of anterior column of left acetabulum,  left wrist FX .  H/o previous falls with pelvic fx, Malignant neoplasm of endometrium, diverticulitis.  Additional factors influencing patient status / progress: lack of adequate social support at home.  Pt is currently limited by decreased functional mobility, activity tolerance, strength, coordination, balance, adherence to precautions, and pain which are affecting her ability to complete ADLs/IADLs at baseline. See grid for details. Pt will benefit from further inpt post acute therapy prior to home as she cannot rely on spouse for help with ADL's or IADl's and she is currently in need of assist with all these things. Will continue to follow.         Plan    Occupational Therapy Initial Treatment Plan   Treatment Interventions: Self Care / Activities of Daily Living, Adaptive Equipment, Neuro Re-Education / Balance, Therapeutic Exercises, Therapeutic Activity  Treatment Frequency: 3 Times per Week  Duration: Until Therapy Goals Met    DC Equipment Recommendations: Unable to determine at this time  Discharge Recommendations: Recommend post-acute placement for additional occupational therapy services prior to discharge home     Subjective    \"I just know I'll need to be fully able to take care of myself at home because my  just really can't do it.\"     Objective       01/20/24 1240   Prior Living Situation   Prior Services Home-Independent   Housing / " Facility [facility]   Steps Into Home 4  (2 sets of 2 with a couple steps on flat surface in between)   Steps In Home 0   Rail Right Rail (Steps into Home)   Bathroom Set up Walk In Shower;Grab Bars;Built-In Shower Chair   Equipment Owned Single Point Cane;Crutches;Tub / Shower Seat;Grab Bar(s) In Tub / Shower;Reacher;Sock Aid   Lives with - Patient's Self Care Capacity Spouse   Comments Spouse is home, but physically cannot assist pt for transfers etc, and presents with memory issues as well as recent eye surgery/impairment that make it very difficult for him to assist with IADl's   Prior Level of ADL Function   Self Feeding Independent   Grooming / Hygiene Independent   Bathing Independent   Dressing Independent   Toileting Independent   Prior Level of IADL Function   Medication Management Independent   Laundry Independent   Kitchen Mobility Independent   Finances Independent   Home Management Unable To Determine At This Time   Shopping Independent   Prior Level Of Mobility Independent Without Device in Community   Driving / Transportation Driving Independent   Occupation (Pre-Hospital Vocational) Employed Part Time  ([employer])   Leisure Interests Unable To Determine At This Time   History of Falls   History of Falls Yes   Date of Last Fall   (reason for this admit, but also previous fall with pelvic fx)   Precautions   Precautions Fall Risk;Weight Bearing As Tolerated Right Lower Extremity;Weight Bearing As Tolerated Left Lower Extremity;Non Weight Bearing Left Upper Extremity;Platform Weight Bearing Left Upper Extremity   Comments ok to use PFFWW with LUE   Vitals   O2 Delivery Device None - Room Air   Pain 0 - 10 Group   Location Abdomen;Pelvis;Wrist   Location Orientation Lower;Mid;Left   Description Aching;Sore   Therapist Pain Assessment During Activity;Post Activity Pain Same as Prior to Activity;Nurse Notified  (pt premedicated for therrapy, reports pain much more tolerable with the  stronger meds but it impairs her thinking)   Active ROM Upper Body   Active ROM Upper Body  X   Dominant Hand Right   Comments RUE WFL, LUE splinted from wrist to just below elbow   Strength Upper Body   Upper Body Strength  X   Comments LUE impaired by injury, immobilized distally   Sensation Upper Body   Upper Extremity Sensation  WDL   Coordination Upper Body   Coordination X   Fine Motor Coordination IMP LUE   Gross Motor Coordination IMP LUE   Balance Assessment   Sitting Balance (Static) Good   Sitting Balance (Dynamic) Fair +   Standing Balance (Static) Fair   Standing Balance (Dynamic) Fair -   Weight Shift Sitting Fair   Weight Shift Standing Fair   Comments with PFFWW   Bed Mobility    Comments [facility] prior to OT   ADL Assessment   Eating Modified Independent  (setup of tray, A with containers)   Grooming Contact Guard Assist;Standing   Upper Body Dressing Minimal Assist   Lower Body Dressing Maximal Assist   Toileting Contact Guard Assist   How much help from another person does the patient currently need...   Putting on and taking off regular lower body clothing? 2   Bathing (including washing, rinsing, and drying)? 2   Toileting, which includes using a toilet, bedpan, or urinal? 3   Putting on and taking off regular upper body clothing? 3   Taking care of personal grooming such as brushing teeth? 3   Eating meals? 3   6 Clicks Daily Activity Score 16   Functional Mobility   Sit to Stand Contact Guard Assist   Bed, Chair, Wheelchair Transfer Contact Guard Assist   Toilet Transfers Contact Guard Assist   Transfer Method Stand Step   Mobility TO BR with FWW, CGA   Distance (Feet) 15   # of Times Distance was Traveled 2   Comments Reports dizziness after taking pain meds   Visual Perception   Visual Perception  WDL   Comments glasses   Edema / Skin Assessment   Comments LUE distal UE splinted   Activity Tolerance   Sitting in Chair > 45 min   Standing 3min x 3   Patient / Family Goals   Patient / Family Goal  #1 Rehab beccause I have to be independent at home   Short Term Goals   Short Term Goal # 1 Pt will dress from seated with [place] in 3 visits   Short Term Goal # 2 Pt will toilet sup in 3 visits   Short Term Goal # 3 Pt will tolerate 15 min standing for IADL task in 4 visits   Education Group   Education Provided Role of Occupational Therapist;Activities of Daily Living;Adaptive Equipment   Role of Occupational Therapist Patient Response Patient;Acceptance;Explanation;Verbal Demonstration   ADL Patient Response Patient;Acceptance;Explanation;Verbal Demonstration   Adaptive Equipment Patient Response Patient;Acceptance;Explanation;Verbal Demonstration   Additional Comments Lengthy discussion with pt about home setup and responsibilities and what she would realiztically need to be able to do for herself at home.  Pt was candid about spouse having memory issues, getting easily overwhelmed with having to manage IADL tasks, and getting easily frustrated if she tries to give him cues to help with task completion.  After long discussion pt revealed she didn't think that spouse would be able physically or emotionally help her with the things she cannot currently do if she were to go home at this time.  She reports she needs to be physically independent when she returns home as he is not a reliable source of help for her. Provided psychosocial intervention and therapeutic listening as pt expressed frustration about not having adequate help at home because she is doing reasonably well given the extent of her injuries

## 2024-01-20 NOTE — CARE PLAN
The patient is Stable - Low risk of patient condition declining or worsening    Shift Goals  Clinical Goals: PT/OT, pain tolerable at 5/10 or less, mobility today  Patient Goals: comfort, pain tolerable at 5/10 or less, discharge home    Progress made toward(s) clinical / shift goals:  Pt able to work with PT/OT today, states pain is tolerable with pain medications. Plan for placement. Progressing on other goals.     Patient is not progressing towards the following goals:

## 2024-01-20 NOTE — FACE TO FACE
Face to Face Supporting Documentation - Home Health    The encounter with this patient was in whole or in part the primary reason for home health admission.    Date of encounter:   Patient:                    MRN:                       YOB: 2023  Alice Whitlock  [id number]  1945     Home health to see patient for:  Skilled Nursing care for assessment, interventions & education, Home health aide, Physical Therapy evaluation and treatment, and Occupational therapy evaluation and treatment    Skilled need for:  New Onset Medical Diagnosis acute ground-level fall at home resulting in an acute left ulnar left radial wrist fracture, right pubic rami fracture, left acetabular fracture.    Skilled nursing interventions to include:  Comment: Medication management    Homebound status evidenced by:  Need the aid of supportive devices such as crutches, canes, wheelchairs or walkers or Needs the assistance of another person in order to leave the home. Leaving home requires a considerable and taxing effort. There is a normal inability to leave the home.    Community Physician to provide follow up care: Denisha Gavin M.D.     Optional Interventions? No      I certify the face to face encounter for this home health care referral meets the CMS requirements and the encounter/clinical assessment with the patient was, in whole, or in part, for the medical condition(s) listed above, which is the primary reason for home health care. Based on my clinical findings: the service(s) are medically necessary, support the need for home health care, and the homebound criteria are met.  I certify that this patient has had a face to face encounter by myself.  Jose Lopez M.D. - NPI: [id number]

## 2024-01-20 NOTE — FACE TO FACE
Face to Face Note  -  Durable Medical Equipment    Jose Lopez M.D. - NPI: [id number]  I certify that this patient is under my care and that they had a durable medical equipment(DME)face to face encounter by myself that meets the physician DME face-to-face encounter requirements with this patient on:    Date of encounter:   Patient:                    MRN:                       YOB: 2023  Alice Whitlock  [id number]  1945     The encounter with the patient was in whole, or in part, for the following medical condition, which is the primary reason for durable medical equipment:  Other - acute ground-level fall at home resulting in an acute left ulnar left radial wrist fracture, right pubic rami fracture, left acetabular fracture    I certify that, based on my findings, the following durable medical equipment is medically necessary:    Other DME Equipment - left arm platform walker .    My Clinical findings support the need for the above equipment due to:  Other - multiple acute pelvic fractures, left wrist fracture

## 2024-01-20 NOTE — DISCHARGE PLANNING
Received choice form @: 1492  Agency/Facility name: [facility]  Sent referral per choice form @: No referral sent pending orders

## 2024-01-20 NOTE — CARE PLAN
The patient is Stable - Low risk of patient condition declining or worsening    Shift Goals  Clinical Goals: Pt will remain free from falls during shift, Tolerate pain with ambulation  Patient Goals: rest    Progress made toward(s) clinical / shift goals:  Pt safety maintained overnight. Pt able to tolerate pain with ambulation. Seen resting post interventions    Patient is not progressing towards the following goals: N/a

## 2024-01-20 NOTE — DISCHARGE PLANNING
Case Management Discharge Planning    Admission Date: 1/18/2024  GMLOS: 3.9  ALOS: 2    6-Clicks ADL Score: 16  6-Clicks Mobility Score: 19  PT and/or OT Eval ordered: Yes  Post-acute Referrals Ordered: Yes  Post-acute Choice Obtained: Yes  Has referral(s) been sent to post-acute provider:  Yes      Anticipated Discharge Dispo: Discharge Disposition: D/T to home under HHA care in anticipation of covered skilled care (06)    DME Needed: No    Action(s) Taken: OTHER    Met with pt at bedside to obtain choice for DME.   Choice form completed and faxed to [facility]    Pending physiatry consult     Escalations Completed: None    Medically Clear: Yes    Next Steps: HCM will continue to follow     Barriers to Discharge: Pending Placement    Is the patient up for discharge tomorrow: No

## 2024-01-21 ENCOUNTER — APPOINTMENT (OUTPATIENT)
Dept: RADIOLOGY | Facility: MEDICAL CENTER | Age: 79
DRG: 543 | End: 2024-01-21
Attending: INTERNAL MEDICINE
Payer: MEDICARE

## 2024-01-21 PROBLEM — D62 ACUTE BLOOD LOSS ANEMIA: Status: ACTIVE | Noted: 2024-01-21

## 2024-01-21 LAB
ALBUMIN SERPL BCP-MCNC: 3.7 G/DL (ref 3.2–4.9)
APTT PPP: 40.1 SEC (ref 24.7–36)
BUN SERPL-MCNC: 15 MG/DL (ref 8–22)
CALCIUM ALBUM COR SERPL-MCNC: 9.6 MG/DL (ref 8.5–10.5)
CALCIUM SERPL-MCNC: 9.4 MG/DL (ref 8.4–10.2)
CHLORIDE SERPL-SCNC: 100 MMOL/L (ref 96–112)
CO2 SERPL-SCNC: 22 MMOL/L (ref 20–33)
CREAT SERPL-MCNC: 0.56 MG/DL (ref 0.5–1.4)
ERYTHROCYTE [DISTWIDTH] IN BLOOD BY AUTOMATED COUNT: 42.1 FL (ref 35.9–50)
ERYTHROCYTE [DISTWIDTH] IN BLOOD BY AUTOMATED COUNT: 42.6 FL (ref 35.9–50)
ERYTHROCYTE [DISTWIDTH] IN BLOOD BY AUTOMATED COUNT: 43.6 FL (ref 35.9–50)
ERYTHROCYTE [DISTWIDTH] IN BLOOD BY AUTOMATED COUNT: 44.2 FL (ref 35.9–50)
FERRITIN SERPL-MCNC: 113 NG/ML (ref 10–291)
GFR SERPLBLD CREATININE-BSD FMLA CKD-EPI: 94 ML/MIN/1.73 M 2
GLUCOSE SERPL-MCNC: 114 MG/DL (ref 65–99)
HCT VFR BLD AUTO: 25.4 % (ref 37–47)
HCT VFR BLD AUTO: 26.1 % (ref 37–47)
HCT VFR BLD AUTO: 27.3 % (ref 37–47)
HCT VFR BLD AUTO: 28.2 % (ref 37–47)
HGB BLD-MCNC: 8.5 G/DL (ref 12–16)
HGB BLD-MCNC: 8.7 G/DL (ref 12–16)
HGB BLD-MCNC: 9 G/DL (ref 12–16)
HGB BLD-MCNC: 9.2 G/DL (ref 12–16)
INR PPP: 0.94 (ref 0.87–1.13)
IRON SATN MFR SERPL: 13 % (ref 15–55)
IRON SERPL-MCNC: 36 UG/DL (ref 40–170)
MAGNESIUM SERPL-MCNC: 2.2 MG/DL (ref 1.5–2.5)
MCH RBC QN AUTO: 32 PG (ref 27–33)
MCH RBC QN AUTO: 32.2 PG (ref 27–33)
MCH RBC QN AUTO: 32.3 PG (ref 27–33)
MCH RBC QN AUTO: 32.3 PG (ref 27–33)
MCHC RBC AUTO-ENTMCNC: 32.6 G/DL (ref 32.2–35.5)
MCHC RBC AUTO-ENTMCNC: 33 G/DL (ref 32.2–35.5)
MCHC RBC AUTO-ENTMCNC: 33.3 G/DL (ref 32.2–35.5)
MCHC RBC AUTO-ENTMCNC: 33.5 G/DL (ref 32.2–35.5)
MCV RBC AUTO: 96 FL (ref 81.4–97.8)
MCV RBC AUTO: 96.2 FL (ref 81.4–97.8)
MCV RBC AUTO: 97.8 FL (ref 81.4–97.8)
MCV RBC AUTO: 98.9 FL (ref 81.4–97.8)
PHOSPHATE SERPL-MCNC: 2.7 MG/DL (ref 2.5–4.5)
PLATELET # BLD AUTO: 176 K/UL (ref 164–446)
PLATELET # BLD AUTO: 185 K/UL (ref 164–446)
PLATELET # BLD AUTO: 190 K/UL (ref 164–446)
PLATELET # BLD AUTO: 192 K/UL (ref 164–446)
PMV BLD AUTO: 9.1 FL (ref 9–12.9)
PMV BLD AUTO: 9.2 FL (ref 9–12.9)
POTASSIUM SERPL-SCNC: 4.3 MMOL/L (ref 3.6–5.5)
PROTHROMBIN TIME: 13 SEC (ref 12–14.6)
RBC # BLD AUTO: 2.64 M/UL (ref 4.2–5.4)
RBC # BLD AUTO: 2.72 M/UL (ref 4.2–5.4)
RBC # BLD AUTO: 2.79 M/UL (ref 4.2–5.4)
RBC # BLD AUTO: 2.85 M/UL (ref 4.2–5.4)
SODIUM SERPL-SCNC: 132 MMOL/L (ref 135–145)
TIBC SERPL-MCNC: 273 UG/DL (ref 250–450)
UIBC SERPL-MCNC: 237 UG/DL (ref 110–370)
WBC # BLD AUTO: 6.8 K/UL (ref 4.8–10.8)
WBC # BLD AUTO: 7.2 K/UL (ref 4.8–10.8)
WBC # BLD AUTO: 7.5 K/UL (ref 4.8–10.8)
WBC # BLD AUTO: 8.1 K/UL (ref 4.8–10.8)

## 2024-01-21 PROCEDURE — 85027 COMPLETE CBC AUTOMATED: CPT

## 2024-01-21 PROCEDURE — 85730 THROMBOPLASTIN TIME PARTIAL: CPT

## 2024-01-21 PROCEDURE — A9270 NON-COVERED ITEM OR SERVICE: HCPCS | Performed by: INTERNAL MEDICINE

## 2024-01-21 PROCEDURE — 74174 CTA ABD&PLVS W/CONTRAST: CPT

## 2024-01-21 PROCEDURE — 770001 HCHG ROOM/CARE - MED/SURG/GYN PRIV*

## 2024-01-21 PROCEDURE — 94760 N-INVAS EAR/PLS OXIMETRY 1: CPT

## 2024-01-21 PROCEDURE — 83735 ASSAY OF MAGNESIUM: CPT

## 2024-01-21 PROCEDURE — 80069 RENAL FUNCTION PANEL: CPT

## 2024-01-21 PROCEDURE — 700102 HCHG RX REV CODE 250 W/ 637 OVERRIDE(OP): Performed by: INTERNAL MEDICINE

## 2024-01-21 PROCEDURE — 36415 COLL VENOUS BLD VENIPUNCTURE: CPT

## 2024-01-21 PROCEDURE — 83540 ASSAY OF IRON: CPT

## 2024-01-21 PROCEDURE — 83550 IRON BINDING TEST: CPT

## 2024-01-21 PROCEDURE — 71260 CT THORAX DX C+: CPT

## 2024-01-21 PROCEDURE — 82728 ASSAY OF FERRITIN: CPT

## 2024-01-21 PROCEDURE — 99291 CRITICAL CARE FIRST HOUR: CPT | Performed by: INTERNAL MEDICINE

## 2024-01-21 PROCEDURE — 700117 HCHG RX CONTRAST REV CODE 255: Performed by: INTERNAL MEDICINE

## 2024-01-21 PROCEDURE — 85610 PROTHROMBIN TIME: CPT

## 2024-01-21 RX ORDER — ASCORBIC ACID 500 MG
500 TABLET ORAL
Status: DISCONTINUED | OUTPATIENT
Start: 2024-01-21 | End: 2024-01-22 | Stop reason: HOSPADM

## 2024-01-21 RX ORDER — FERROUS SULFATE 325(65) MG
325 TABLET ORAL
Status: DISCONTINUED | OUTPATIENT
Start: 2024-01-21 | End: 2024-01-22 | Stop reason: HOSPADM

## 2024-01-21 RX ADMIN — ROSUVASTATIN 10 MG: 10 TABLET, FILM COATED ORAL at 17:13

## 2024-01-21 RX ADMIN — OXYCODONE HYDROCHLORIDE 2.5 MG: 5 TABLET ORAL at 16:10

## 2024-01-21 RX ADMIN — GABAPENTIN 300 MG: 300 CAPSULE ORAL at 17:13

## 2024-01-21 RX ADMIN — ACETAMINOPHEN 1000 MG: 500 TABLET ORAL at 06:08

## 2024-01-21 RX ADMIN — OXYCODONE HYDROCHLORIDE AND ACETAMINOPHEN 500 MG: 500 TABLET ORAL at 17:13

## 2024-01-21 RX ADMIN — MELOXICAM 15 MG: 7.5 TABLET ORAL at 06:08

## 2024-01-21 RX ADMIN — FERROUS SULFATE TAB 325 MG (65 MG ELEMENTAL FE) 325 MG: 325 (65 FE) TAB at 17:13

## 2024-01-21 RX ADMIN — OXYCODONE HYDROCHLORIDE 5 MG: 5 TABLET ORAL at 09:14

## 2024-01-21 RX ADMIN — BISACODYL 10 MG: 10 SUPPOSITORY RECTAL at 13:47

## 2024-01-21 RX ADMIN — LEVOTHYROXINE SODIUM 88 MCG: 0.09 TABLET ORAL at 06:08

## 2024-01-21 RX ADMIN — DOCUSATE SODIUM 50MG AND SENNOSIDES 8.6MG 2 TABLET: 8.6; 5 TABLET, FILM COATED ORAL at 17:13

## 2024-01-21 RX ADMIN — DOCUSATE SODIUM 50MG AND SENNOSIDES 8.6MG 2 TABLET: 8.6; 5 TABLET, FILM COATED ORAL at 06:09

## 2024-01-21 RX ADMIN — GABAPENTIN 300 MG: 300 CAPSULE ORAL at 06:08

## 2024-01-21 RX ADMIN — ACETAMINOPHEN 1000 MG: 500 TABLET ORAL at 17:13

## 2024-01-21 RX ADMIN — IOHEXOL 100 ML: 350 INJECTION, SOLUTION INTRAVENOUS at 10:23

## 2024-01-21 RX ADMIN — POLYETHYLENE GLYCOL 3350 1 PACKET: 17 POWDER, FOR SOLUTION ORAL at 16:17

## 2024-01-21 RX ADMIN — SERTRALINE HYDROCHLORIDE 100 MG: 50 TABLET ORAL at 06:08

## 2024-01-21 ASSESSMENT — PAIN DESCRIPTION - PAIN TYPE
TYPE: ACUTE PAIN

## 2024-01-21 ASSESSMENT — ENCOUNTER SYMPTOMS
NAUSEA: 0
COUGH: 0
HEADACHES: 0
ABDOMINAL PAIN: 0
SHORTNESS OF BREATH: 0
DIZZINESS: 0
BACK PAIN: 0
FEVER: 0
VOMITING: 0
FALLS: 1
CONSTIPATION: 0
DIARRHEA: 0
PALPITATIONS: 0
CHILLS: 0

## 2024-01-21 NOTE — PROGRESS NOTES
Received report from night shift RN, assumed care of pt. AAOx4, VSS on room air. Pt sitting up in bed eating breakfast. Cast in place to L arm. Updated pt on plan of care, pt understands that we are monitoring her Hgb today. Answered any questions. Safety precautions in place, pt educated to call for assistance.

## 2024-01-21 NOTE — CARE PLAN
The patient is Stable - Low risk of patient condition declining or worsening    Shift Goals  Clinical Goals: pain tolerable at 5/10 with mobility, monitor hgb today, comfort  Patient Goals: comfort, pain tolerable with mobility    Progress made toward(s) clinical / shift goals:  Pt states pain has been tolerable this shift with pain medication. Hgb has come up from this morning. Progressing on other goals.     Patient is not progressing towards the following goals:

## 2024-01-21 NOTE — PROGRESS NOTES
Hospital Medicine Daily Progress Note    Date of Service  1/21/2024    Chief Complaint  Alice Whitlock is a 78 y.o. female admitted 1/18/2024 with   Chief Complaint   Patient presents with    Fall     Tripped over dog   Landed on Left side left wrist took all the impact +deformity +csm  Hx of pelvic frx reports pain able to ambulate w/assistance      Hospital Course  No notes on file    Interval Problem Update  1/19:  Patient states she has been mild to moderate pain to her left wrist, mild to moderate pain to her right groin and moderate pain to the left inguinal area.  - I discussed case with patient and her  at bedside.  I showed the CT scans for her fractures.  - We discussed at this moment she is on conservative management, no surgery at this time.  We are awaiting PT and OT evaluation.  I explained to them she may need skilled nursing facility if she is unable to be functional enough at home.  Her  had recent cataract surgery and is unable to do any heavy lifting.    1/20:  Patient stated her pain was moderate, but being controlled with oxycodone.  She does not think her  can take care of her at the moment.  I discussed with PT and OT, they are recommending postacute placement.    1/21:  Pt c/o increased weakness, sleepy.  Pt hgb dropped to 8.5, I repeated cbc confirmed hgb 8.7  Pt agreed to repeat CT scans. I ordered STAT CTA A/P, CT chest with.  Monitoring closely, concern patient has splenic laceration, worsening pelvic hematoma or other intra-abdominal bleed, possible left chest pulmonary contusion or hemothorax.    I have discussed this patient's plan of care and discharge plan at IDT rounds today with Case Management, Nursing, Nursing leadership, and other members of the IDT team.    Consultants/Specialty  orthopedics    Code Status  Full Code    Disposition  Medically Cleared  I have placed the appropriate orders for post-discharge needs.    Review of Systems  Review of Systems    Constitutional:  Negative for chills, fever and malaise/fatigue.   Respiratory:  Negative for cough and shortness of breath.    Cardiovascular:  Negative for chest pain and palpitations.   Gastrointestinal:  Negative for abdominal pain, constipation, diarrhea, nausea and vomiting.   Musculoskeletal:  Positive for falls and joint pain. Negative for back pain.        Left wrist pain, right groin pain, right left groin pain   Neurological:  Negative for dizziness and headaches.   All other systems reviewed and are negative.       Physical Exam  Temp:  [36.8 °C (98.2 °F)-37.4 °C (99.3 °F)] 36.9 °C (98.5 °F)  Pulse:  [81-90] 82  Resp:  [12-16] 12  BP: (116-137)/(53-63) 119/53  SpO2:  [91 %-94 %] 94 %    Physical Exam  Vitals and nursing note reviewed.   Constitutional:       General: She is not in acute distress.     Appearance: Normal appearance. She is not ill-appearing.   HENT:      Head: Normocephalic and atraumatic.   Eyes:      General: No scleral icterus.     Extraocular Movements: Extraocular movements intact.   Cardiovascular:      Rate and Rhythm: Normal rate.      Pulses: Normal pulses.      Heart sounds: Normal heart sounds. No murmur heard.  Pulmonary:      Effort: Pulmonary effort is normal. No respiratory distress.      Breath sounds: Normal breath sounds.   Abdominal:      General: Abdomen is flat. Bowel sounds are normal. There is no distension.      Palpations: Abdomen is soft.   Musculoskeletal:         General: Tenderness (Left wrist, right groin and left groin.) present. No swelling. Normal range of motion.      Cervical back: Normal range of motion and neck supple.   Skin:     General: Skin is warm.      Capillary Refill: Capillary refill takes less than 2 seconds.      Coloration: Skin is not jaundiced.      Findings: No erythema.   Neurological:      General: No focal deficit present.      Mental Status: She is alert and oriented to person, place, and time. Mental status is at baseline.       Motor: No weakness.   Psychiatric:         Mood and Affect: Mood normal.         Behavior: Behavior normal.         Thought Content: Thought content normal.         Judgment: Judgment normal.         Fluids    Intake/Output Summary (Last 24 hours) at 1/21/2024 0855  Last data filed at 1/21/2024 0800  Gross per 24 hour   Intake 2040 ml   Output 2100 ml   Net -60 ml       Laboratory  Recent Labs     01/18/24 2255 01/21/24  0013 01/21/24  0751   WBC 12.5* 7.2 6.8   RBC 3.87* 2.64* 2.72*   HEMOGLOBIN 12.3 8.5* 8.7*   HEMATOCRIT 37.0 25.4* 26.1*   MCV 95.6 96.2 96.0   MCH 31.8 32.2 32.0   MCHC 33.2 33.5 33.3   RDW 41.5 42.1 42.6   PLATELETCT 227 185 190   MPV 8.6* 9.2 9.1     Recent Labs     01/18/24 2255 01/21/24  0013   SODIUM 138 132*   POTASSIUM 4.0 4.3   CHLORIDE 104 100   CO2 22 22   GLUCOSE 123* 114*   BUN 19 15   CREATININE 0.62 0.56   CALCIUM 10.2 9.4     Recent Labs     01/21/24  0751   APTT 40.1*   INR 0.94               Imaging  CT-PELVIS W/O PLUS RECONS   Final Result         1.  Comminuted right symphyseal and superior pubic ramus fracture   2.  Subtle left acetabular anterior column fracture   3.  Intermediate density anterior lateral within the left pelvis, appearance favoring hematoma. Somewhat displaces the bladder to the right.      DX-WRIST-LIMITED 2- LEFT   Final Result         1.  Comminuted impacted distal radial fracture, partially reduced since prior study   2.  Ulnar styloid fracture      DX-WRIST-COMPLETE 3+ LEFT   Final Result         1.  Comminuted impacted distal radial fracture   2.  Ulnar styloid fracture      DX-PELVIS-1 OR 2 VIEWS   Final Result         1.  No acute traumatic bony injury.      CTA ABDOMEN PELVIS W & W/O POST PROCESS    (Results Pending)   CT-CHEST (THORAX) WITH    (Results Pending)        Assessment/Plan  * Pubic ramus fracture, right, closed, initial encounter (HCC)- (present on admission)  Assessment & Plan  Post fall  Causing significant pain with movement or  standing  Due to this pain, patient is unable to bear weight, certainly unable to ambulate  I scheduled acetaminophen.    Patient requiring oxycodone  I discussed case with PT and OT, they recommended postacute placement  Continue outpatient orthopedic surgery follow-up    Pending SNF vs IRF    Acute blood loss anemia  Assessment & Plan  Pt hgb dropped to 8.5, I repeated cbc confirmed hgb 8.7  Pt agreed to repeat CT scans. I ordered STAT CTA A/P, CT chest with.  Monitoring closely, concern patient has splenic laceration, worsening pelvic hematoma or other intra-abdominal bleed, possible left chest pulmonary contusion or hemothorax.  - repeat CBC every 4H  - patient may need blood transfusions, general surgery consult    Pelvic hematoma, female- (present on admission)  Assessment & Plan  Minimal seen on CT scan.  Due to acute fall at home.  Patient is not on anticoagulation  Monitoring, hemoglobin 12.3 from 13.4  Hgb returned 8.5 at night and 8.7 on repeat today, drastic decline. Pt agreed for CT chest, CTA A/P, I placed STAT orders.    Closed fracture of distal end of left radius- (present on admission)  Assessment & Plan  Post fall  Status post reduction and splinting in the ER  Outpatient follow-up with orthopedic surgery  Continue pain regimen  Continue LUE splint  I ordered a LUE platform walker DME    Closed nondisplaced fracture of anterior column of left acetabulum (HCC)- (present on admission)  Assessment & Plan  Post fall  ERP did discuss the case with orthopedic surgery who recommended outpatient follow-up    I reviewed her previous DEXA scan, she does have diagnosis of osteoporosis to left femur and lumbar spine on 12/19/2019.    -- Her acute fractures due to osteoporosis.    Consider MRI pelvis and femurs if she has ongoing pain out of proportion  Pending SNF vs IRF  I ordered a repeat CT scan    Fall- (present on admission)  Assessment & Plan  Patient tripped over a dog she is taking care of.  Resulting  in multiple fractures  Nonsyncopal  Pending postacute placement, have placed referrals for SNF, inpatient rehab/physiatry consult    SNF vs IRF, pending referrals  DME [facility] platform walker pending    Post-surgical hypothyroidism- (present on admission)  Assessment & Plan  Continue synthroid    Hyperlipidemia- (present on admission)  Assessment & Plan  Continue crestor    Chronic depression- (present on admission)  Assessment & Plan  Continue zoloft         VTE prophylaxis:   SCDs/TEDs   pharmacologic prophylaxis contraindicated due to acute blood loss anemia      I have performed a physical exam and reviewed and updated ROS and Plan today (1/21/2024). In review of yesterday's note (1/20/2024), there are no changes except as documented above.        Patient is critically ill.   I have personally seen and evaluated patient in room 2218-1 as Internal Medicine Hospitalist services.    The patient continues to have: acute blood loss anemia due to suspected pelvic hematoma, patient has potential massive hemorrhage occurring.  The critical care that I am providing today is:   1) STAT CT imaging  2) repeat CBC Q4H  3) monitoring closely, likely needs blood transfusion    The vital organ system that is affected is the:   Cardiac, CNS, Circulatory    If untreated there is a high chance of deterioration into: cardiac arrest, respiratory failure and eventually death.    The critical that has been undertaken is medically complex.  There has been no overlap in critical care time.  Critical Care Time not including procedures: 35 minutes

## 2024-01-21 NOTE — ASSESSMENT & PLAN NOTE
Pt hgb dropped to 8.5, I repeated cbc confirmed hgb 8.7  Pt agreed to repeat CT scans. I ordered STAT CTA A/P, CT chest with.  Monitoring closely, concern patient has splenic laceration, worsening pelvic hematoma or other intra-abdominal bleed, possible left chest pulmonary contusion or hemothorax.  - repeat CBC every 4H  - patient may need blood transfusions, general surgery consult

## 2024-01-21 NOTE — CARE PLAN
The patient is Stable - Low risk of patient condition declining or worsening    Shift Goals  Clinical Goals: Pain tolerance with movement post intervention of less than 4/10 on pain scale  Patient Goals: Sleep comfortably, tolerate pain with ambulation to bathroom.    Progress made toward(s) clinical / shift goals:  Pts pain was able to comfortably ambulate post intervention of medicating per MAR, with pain level less than 4/10.     Patient is not progressing towards the following goals:

## 2024-01-21 NOTE — PROGRESS NOTES
Krupa from Lab called with critical result of Hbg decrease of 12.3 on 1/18 to 8.5 this morning. Critical lab result read back to Krupa.   Dr. Jefferson notified of critical lab result at 02:23.  Critical lab result read back by Dr. Jefferson. Orders received to monitor pt for changes in vital signs or signs of bleeding.

## 2024-01-21 NOTE — DISCHARGE PLANNING
Case Management Discharge Planning    Admission Date: 1/18/2024  GMLOS: 3.9  ALOS: 3    6-Clicks ADL Score: 16  6-Clicks Mobility Score: 19  PT and/or OT Eval ordered: Yes  Post-acute Referrals Ordered: Yes  Post-acute Choice Obtained: No  Has referral(s) been sent to post-acute provider:  Yes      Anticipated Discharge Dispo: Discharge Disposition: D/T to home under [facility] care in anticipation of covered skilled care (06)    DME Needed: No    Action(s) Taken: Updated Provider/Nurse on Discharge Plan    Patient discussed in IDT rounds. Per chart notes, patient was not agreeable with discharging to SNF. Patient is now reportedly agreeable to post-acute placement. LSW sent local [place] SNFs per protocol. Awaiting acceptance.     LSW left voicemail for [facility] regarding referral for platform walker. Plan of care ongoing.      Escalations Completed: None    Medically Clear: Yes    Next Steps: HCM will continue to follow and assist with discharge planning as needed.     Barriers to Discharge: Medical clearance    Is the patient up for discharge tomorrow: No

## 2024-01-21 NOTE — PROGRESS NOTES
Received report from day shift nurse. Assumed pt care. Pt is A&Ox4, resting comfortably in bed. No signs of SOB/respiratory distress. Denies the need for pain meds at this time. Went over plan of night with patient. Fall precautions in place. Bed at lowest position. Call light and personal belongings within reach. No further needs at this time.

## 2024-01-22 ENCOUNTER — PATIENT OUTREACH (OUTPATIENT)
Dept: SCHEDULING | Facility: IMAGING CENTER | Age: 79
End: 2024-01-22
Payer: COMMERCIAL

## 2024-01-22 VITALS
HEIGHT: 63 IN | DIASTOLIC BLOOD PRESSURE: 63 MMHG | WEIGHT: 145 LBS | OXYGEN SATURATION: 90 % | SYSTOLIC BLOOD PRESSURE: 120 MMHG | RESPIRATION RATE: 16 BRPM | BODY MASS INDEX: 25.69 KG/M2 | TEMPERATURE: 98.2 F | HEART RATE: 78 BPM

## 2024-01-22 LAB
ALBUMIN SERPL BCP-MCNC: 3.8 G/DL (ref 3.2–4.9)
BUN SERPL-MCNC: 12 MG/DL (ref 8–22)
CALCIUM ALBUM COR SERPL-MCNC: 9.7 MG/DL (ref 8.5–10.5)
CALCIUM SERPL-MCNC: 9.5 MG/DL (ref 8.4–10.2)
CHLORIDE SERPL-SCNC: 100 MMOL/L (ref 96–112)
CO2 SERPL-SCNC: 23 MMOL/L (ref 20–33)
CREAT SERPL-MCNC: 0.57 MG/DL (ref 0.5–1.4)
ERYTHROCYTE [DISTWIDTH] IN BLOOD BY AUTOMATED COUNT: 43.7 FL (ref 35.9–50)
GFR SERPLBLD CREATININE-BSD FMLA CKD-EPI: 93 ML/MIN/1.73 M 2
GLUCOSE SERPL-MCNC: 115 MG/DL (ref 65–99)
HCT VFR BLD AUTO: 25.7 % (ref 37–47)
HGB BLD-MCNC: 8.1 G/DL (ref 12–16)
HGB BLD-MCNC: 8.4 G/DL (ref 12–16)
HGB RETIC QN AUTO: 34.4 PG/CELL (ref 29–35)
IMM RETICS NFR: 18.8 % (ref 2.6–16.1)
LDH SERPL L TO P-CCNC: 196 U/L (ref 107–266)
MAGNESIUM SERPL-MCNC: 2.1 MG/DL (ref 1.5–2.5)
MCH RBC QN AUTO: 31.8 PG (ref 27–33)
MCHC RBC AUTO-ENTMCNC: 32.7 G/DL (ref 32.2–35.5)
MCV RBC AUTO: 97.3 FL (ref 81.4–97.8)
PHOSPHATE SERPL-MCNC: 2.7 MG/DL (ref 2.5–4.5)
PLATELET # BLD AUTO: 202 K/UL (ref 164–446)
PMV BLD AUTO: 9.1 FL (ref 9–12.9)
POTASSIUM SERPL-SCNC: 4.4 MMOL/L (ref 3.6–5.5)
RBC # BLD AUTO: 2.64 M/UL (ref 4.2–5.4)
RETICS # AUTO: 0.06 M/UL (ref 0.04–0.12)
RETICS/RBC NFR: 2.3 % (ref 0.8–2.6)
SODIUM SERPL-SCNC: 133 MMOL/L (ref 135–145)
WBC # BLD AUTO: 6.5 K/UL (ref 4.8–10.8)

## 2024-01-22 PROCEDURE — 83010 ASSAY OF HAPTOGLOBIN QUANT: CPT

## 2024-01-22 PROCEDURE — 83615 LACTATE (LD) (LDH) ENZYME: CPT

## 2024-01-22 PROCEDURE — 700102 HCHG RX REV CODE 250 W/ 637 OVERRIDE(OP): Performed by: INTERNAL MEDICINE

## 2024-01-22 PROCEDURE — A9270 NON-COVERED ITEM OR SERVICE: HCPCS | Performed by: INTERNAL MEDICINE

## 2024-01-22 PROCEDURE — 85046 RETICYTE/HGB CONCENTRATE: CPT

## 2024-01-22 PROCEDURE — 83735 ASSAY OF MAGNESIUM: CPT

## 2024-01-22 PROCEDURE — 36415 COLL VENOUS BLD VENIPUNCTURE: CPT

## 2024-01-22 PROCEDURE — 85027 COMPLETE CBC AUTOMATED: CPT

## 2024-01-22 PROCEDURE — 80069 RENAL FUNCTION PANEL: CPT

## 2024-01-22 PROCEDURE — 85018 HEMOGLOBIN: CPT

## 2024-01-22 PROCEDURE — 99239 HOSP IP/OBS DSCHRG MGMT >30: CPT | Performed by: INTERNAL MEDICINE

## 2024-01-22 RX ORDER — ACETAMINOPHEN 500 MG
1000 TABLET ORAL EVERY 12 HOURS
Status: SHIPPED
Start: 2024-01-22 | End: 2024-01-27

## 2024-01-22 RX ORDER — GABAPENTIN 300 MG/1
300 CAPSULE ORAL 3 TIMES DAILY
Status: SHIPPED
Start: 2024-01-22

## 2024-01-22 RX ORDER — FAMOTIDINE 20 MG/1
20 TABLET, FILM COATED ORAL 3 TIMES DAILY PRN
Status: SHIPPED
Start: 2024-01-22

## 2024-01-22 RX ORDER — ACETAMINOPHEN 325 MG/1
650 TABLET ORAL EVERY 6 HOURS PRN
Qty: 30 TABLET | Refills: 0 | Status: SHIPPED
Start: 2024-01-22

## 2024-01-22 RX ORDER — FERROUS SULFATE 325(65) MG
325 TABLET ORAL
Qty: 30 TABLET | Status: SHIPPED
Start: 2024-01-23

## 2024-01-22 RX ORDER — ONDANSETRON 4 MG/1
4 TABLET, ORALLY DISINTEGRATING ORAL EVERY 4 HOURS PRN
Qty: 10 TABLET | Refills: 0 | Status: SHIPPED
Start: 2024-01-22

## 2024-01-22 RX ORDER — LACTULOSE 20 G/30ML
15 SOLUTION ORAL 2 TIMES DAILY
Status: DISCONTINUED | OUTPATIENT
Start: 2024-01-22 | End: 2024-01-22 | Stop reason: HOSPADM

## 2024-01-22 RX ORDER — BISACODYL 10 MG
10 SUPPOSITORY, RECTAL RECTAL
Refills: 0 | Status: SHIPPED
Start: 2024-01-22

## 2024-01-22 RX ORDER — OXYCODONE HYDROCHLORIDE 5 MG/1
5 TABLET ORAL EVERY 8 HOURS PRN
Qty: 15 TABLET | Refills: 0 | Status: SHIPPED | OUTPATIENT
Start: 2024-01-22 | End: 2024-01-27

## 2024-01-22 RX ORDER — POLYETHYLENE GLYCOL 3350 17 G/17G
17 POWDER, FOR SOLUTION ORAL
Refills: 3 | Status: SHIPPED
Start: 2024-01-22

## 2024-01-22 RX ORDER — GABAPENTIN 300 MG/1
300 CAPSULE ORAL 3 TIMES DAILY
Status: DISCONTINUED | OUTPATIENT
Start: 2024-01-22 | End: 2024-01-22 | Stop reason: HOSPADM

## 2024-01-22 RX ORDER — OMEPRAZOLE 20 MG/1
20 CAPSULE, DELAYED RELEASE ORAL DAILY
Status: SHIPPED
Start: 2024-01-22

## 2024-01-22 RX ORDER — LACTULOSE 20 G/30ML
15 SOLUTION ORAL 2 TIMES DAILY
Qty: 450 ML | Status: SHIPPED
Start: 2024-01-22

## 2024-01-22 RX ADMIN — LACTULOSE 15 ML: 20 SOLUTION ORAL at 12:28

## 2024-01-22 RX ADMIN — MELOXICAM 15 MG: 7.5 TABLET ORAL at 05:17

## 2024-01-22 RX ADMIN — OXYCODONE HYDROCHLORIDE 5 MG: 5 TABLET ORAL at 00:32

## 2024-01-22 RX ADMIN — GABAPENTIN 300 MG: 300 CAPSULE ORAL at 05:16

## 2024-01-22 RX ADMIN — DOCUSATE SODIUM 50MG AND SENNOSIDES 8.6MG 2 TABLET: 8.6; 5 TABLET, FILM COATED ORAL at 05:18

## 2024-01-22 RX ADMIN — ACETAMINOPHEN 650 MG: 325 TABLET ORAL at 12:28

## 2024-01-22 RX ADMIN — LEVOTHYROXINE SODIUM 88 MCG: 0.09 TABLET ORAL at 05:16

## 2024-01-22 RX ADMIN — ACETAMINOPHEN 1000 MG: 500 TABLET ORAL at 05:13

## 2024-01-22 RX ADMIN — GABAPENTIN 300 MG: 300 CAPSULE ORAL at 12:28

## 2024-01-22 RX ADMIN — SERTRALINE HYDROCHLORIDE 100 MG: 50 TABLET ORAL at 05:15

## 2024-01-22 ASSESSMENT — PAIN DESCRIPTION - PAIN TYPE: TYPE: ACUTE PAIN

## 2024-01-22 NOTE — DISCHARGE PLANNING
1399  TRAVIS received ph call from [facility]. Advised that Pt has been accepted and has bed ready for Pt.   TRAVIS notified RN CM via teams.

## 2024-01-22 NOTE — CARE PLAN
The patient is Stable - Low risk of patient condition declining or worsening    Shift Goals  Clinical Goals: Pain management, Safety, Comfort  Patient Goals: Pain management, Comfort, Sleep    Progress made toward(s) clinical / shift goals:  Patient reports ambulating is easier today than yesterday. She is gaining strength and making fewer complaints of pain.     Patient is not progressing towards the following goals:

## 2024-01-22 NOTE — DISCHARGE PLANNING
1212  DPA received faxed choice form(s). DPA placed choice in Pt Media file.     1357  DPA received faxed choice form(s). DPA placed choice in Pt Media file.

## 2024-01-22 NOTE — DISCHARGE SUMMARY
"Discharge Summary    CHIEF COMPLAINT ON ADMISSION  Chief Complaint   Patient presents with    Fall     Tripped over dog   Landed on Left side left wrist took all the impact +deformity +csm  Hx of pelvic frx reports pain able to ambulate w/assistance        Reason for Admission  EMS    Admission Date  1/18/2024     CODE STATUS  Full Code    HPI & HOSPITAL COURSE  This is \"Alice Whitlock is a 78 y.o. female who presented 1/18/2024 with ground-level fall.  Patient states she was in her usual state of health until she tripped over a dog they are watching while their neighbors are on vacation.  She states she landed mostly on the left.  After the fall she did have a left wrist pain as well as right groin pain.  Patient states she has had some right groin pain over the last 2 years after previous fall, no fracture noted then.  I did discuss the case including labs and imaging with the ER physician. \" (As per Dr. Jefferson on H&P).    As per discussion with EDP and on-call surgeon Dr. Green, patient's current fractures are best treated with conservative management, no surgery was recommended.  PT and OT evaluated patient recommending postacute placement.  At this time patient's has been is very supportive but unfortunately as he has had recent eye surgery, he is unable to help the patient at home, unable to do any heavy lifting.    I reviewed her previous DEXA scan, she does have diagnosis of osteoporosis to left femur and lumbar spine on 12/19/2019.  Her current LUE and hip fractures are due to the ground-level fall at home and osteoporosis complication.    Currently her pain is well-controlled with scheduled acetaminophen 1000 mg p.o. every 12 hours.  I have provided a printed prescription for oxycodone 5 mg every 8 hours for severe pain.    During her hospitalization, we did confirm on CT scan patient had a 9.7 x 4.9 cm anterior lateral left pelvic density, favoring hematoma.  Patient had a hemoglobin of drop to 8.5, I " repeated cbc confirmed hgb 8.7.  She had agreed to repeat CT scans, concern patient has splenic laceration, worsening pelvic hematoma or other intra-abdominal bleed, possible left chest pulmonary contusion or hemothorax.    CTA A/P and CT chest with contrast were both negative for any signs of new or growing hematomas or source of hemorrhage.  She had a stable 9 cm pelvic hematoma similar to initial CT scan.     Patient remained stable, had no further worsening of her anemia.  I do suspect she had acute blood loss anemia from the hematoma but she was also dehydrated showing hemoconcentration on admission.  She does have iron deficiency anemia (iron saturation of 13, TIBC 273, ferritin 113).  She is continued on oral iron every other day with vitamin C.    Therefore, she is discharged in good and stable condition to skilled nursing facility.    The patient met 2-midnight criteria for an inpatient stay at the time of discharge.      FOLLOW UP ITEMS POST DISCHARGE  Follow-up with PCP as below  Follow-up with [facility] orthopedic surgery after SNF    DISCHARGE DIAGNOSES  Principal Problem:    Pubic ramus fracture, right, closed, initial encounter (HCC) (POA: Yes)  Active Problems:    Pelvic hematoma, female (POA: Yes)    Acute blood loss anemia (POA: No)    Fall (POA: Yes)    Closed nondisplaced fracture of anterior column of left acetabulum (HCC) (POA: Yes)    Closed fracture of distal end of left radius (POA: Yes)    Chronic depression (POA: Yes)    Hyperlipidemia (POA: Yes)    Post-surgical hypothyroidism (POA: Yes)      Overview: Formatting of this note might be different from the original.      Overview:       secondary to thyroid removal  Resolved Problems:    * No resolved hospital problems. *      FOLLOW UP  No future appointments.  Chester Green M.D.  [address]  [facility] [address] [phone]    Schedule an appointment as soon as possible for a visit   For follow up    Chester  MUSA Green  [address]  [facility] [address]  [phone]          Denisha Gavin M.D.  [address]  [facility] [id number] [phone]    Schedule an appointment as soon as possible for a visit in 1 week(s)  Follow-up for a posthospital visit.   - Please recheck CBC as patient does have iron deficiency anemia.      MEDICATIONS ON DISCHARGE     Medication List        START taking these medications        Instructions   bisacodyl 10 MG Supp  Commonly known as: Dulcolax   Insert 1 Suppository into the rectum 1 time a day as needed (if magnesium hydroxide ineffective after 24 hours).  Dose: 10 mg     famotidine 20 MG Tabs  Commonly known as: Pepcid   Take 1 Tablet by mouth 3 times a day as needed (30 minutes before or after meals for acid reflux).  Dose: 20 mg     ferrous sulfate 325 (65 Fe) MG tablet  Start taking on: January 23, 2024   Take 1 Tablet by mouth every 48 hours.  Dose: 325 mg     lactulose 20 GM/30ML Soln   Take 15 mL by mouth 2 times a day.  Dose: 15 mL     omeprazole 20 MG delayed-release capsule  Commonly known as: PriLOSEC   Take 1 Capsule by mouth every day.  Dose: 20 mg     ondansetron 4 MG Tbdp  Commonly known as: Zofran ODT   Take 1 Tablet by mouth every four hours as needed for Nausea/Vomiting (give PO if IV route is unavailable.).  Dose: 4 mg     oxyCODONE immediate-release 5 MG Tabs  Commonly known as: Roxicodone   Take 1 Tablet by mouth every 8 hours as needed for Severe Pain for up to 5 days.  Dose: 5 mg     polyethylene glycol/lytes Pack  Commonly known as: Miralax   Take 1 Packet by mouth 1 time a day as needed (if sennosides and docusate ineffective after 24 hours).  Dose: 17 g            CHANGE how you take these medications        Instructions   * acetaminophen 500 MG Tabs  What changed:   how much to take  when to take this  reasons to take this  Commonly known as: Tylenol   Doctor's comments: Do not exceed total acetaminophen dose of 4000mg in 24  hours  Take 2 Tablets by mouth every 12 hours for 5 days.  Dose: 1,000 mg     * acetaminophen 325 MG Tabs  What changed: You were already taking a medication with the same name, and this prescription was added. Make sure you understand how and when to take each.  Commonly known as: Tylenol   Take 2 Tablets by mouth every 6 hours as needed for Mild Pain or Fever. Do not exceed total acetaminophen dose of 4000mg in 24 hours  Dose: 650 mg     gabapentin 300 MG Caps  What changed: when to take this  Commonly known as: Neurontin   Take 1 Capsule by mouth 3 times a day.  Dose: 300 mg           * This list has 2 medication(s) that are the same as other medications prescribed for you. Read the directions carefully, and ask your doctor or other care provider to review them with you.                CONTINUE taking these medications        Instructions   alendronate 70 MG Tabs  Commonly known as: Fosamax   TAKE 1 TABLET BY MOUTH ONCE A WEEK IN THE MORNING WITH A FULL GLASS OF WATER, 30 MINUTES BEFORE THE FIRST MEAL, BEVERAGE OR MEDICATION OF THE DAY. REMAIN UPRIGHT     ascorbic acid 1000 MG tablet  Commonly known as: Vitamin C   Take  by mouth.     cyclobenzaprine 5 mg tablet  Commonly known as: Flexeril   TAKE 1 TABLET BY MOUTH ONCE DAILY AS NEEDED FOR MUSCLE SPASM OR  MODERATE  PAIN     diclofenac sodium 1 % Gel  Commonly known as: Voltaren      FLONASE NA   Administer  into affected nostril(S).     levothyroxine 88 MCG Tabs  Commonly known as: Synthroid   Take 1 tablet by mouth once daily     meloxicam 15 MG tablet  Commonly known as: Mobic   Take 1 tablet by mouth once daily     prednisoLONE sodium phosphate 1 % Soln  Commonly known as: Inflamase Forte      rosuvastatin 10 MG Tabs  Commonly known as: Crestor   Take 1 Tablet by mouth every evening.  Dose: 10 mg     sennosides-docusate sodium 8.6-50 MG tablet  Commonly known as: Senokot-S   Take 1 Tablet by mouth every day.  Dose: 1 Tablet     sertraline 100 MG  "Tabs  Commonly known as: Zoloft   Take 1 tablet by mouth once daily     VITAMIN B-12 PO   Take 1 Tab by mouth every day.  Dose: 1 Tablet     vitamin D2 (Ergocalciferol) 1.25 MG (94840 UT) Caps capsule  Commonly known as: Drisdol   Take 50,000 Units by mouth every 7 days.  Dose: 50,000 Units            STOP taking these medications      vitamin E 1000 Unit (450 mg) Caps  Commonly known as: Vitamin E            ASK your doctor about these medications        Instructions   oxyCODONE-acetaminophen 5-325 MG Tabs  Commonly known as: Percocet  Ask about: Should I take this medication?   Take 1 Tablet by mouth every 6 hours as needed (pain) for up to 3 days.  Dose: 1 Tablet              Allergies  Allergies   Allergen Reactions    Penicillins      Reaction unknown \"fever\"       DIET  Orders Placed This Encounter   Procedures    Diet Order Diet: Regular     Standing Status:   Standing     Number of Occurrences:   1     Order Specific Question:   Diet:     Answer:   Regular [1]       ACTIVITY  As tolerated and directed by skilled nursing.  Weight bearing as tolerated    LINES, DRAINS, AND WOUNDS  This is an automated list. Peripheral IVs will be removed prior to discharge.  Peripheral IV 01/18/24 20 G Right Antecubital (Active)   Site Assessment Clean;Dry;Intact 01/21/24 2210   Dressing Type Transparent 01/21/24 2210   Line Status Saline locked 01/21/24 2210   Dressing Status Clean;Dry;Intact 01/21/24 2210   Dressing Intervention N/A 01/21/24 2210   Dressing Change Due 01/25/24 01/21/24 2210   Date Primary Tubing Changed 01/20/24 01/19/24 2300   Infiltration Grading ([facility], [facility]) 0 01/21/24 2210   Phlebitis Scale ([facility] Only) 0 01/21/24 2210          Peripheral IV 01/18/24 20 G Right Antecubital (Active)   Site Assessment Clean;Dry;Intact 01/21/24 2210   Dressing Type Transparent 01/21/24 2210   Line Status Saline locked 01/21/24 2210   Dressing Status Clean;Dry;Intact 01/21/24 2210   Dressing Intervention N/A 01/21/24 2210 "   Dressing Change Due 01/25/24 01/21/24 2210   Date Primary Tubing Changed 01/20/24 01/19/24 2300   [facility] Grading ([facility]) 0 01/21/24 2210   Phlebitis Scale ([facility] Only) 0 01/21/24 2210               MENTAL STATUS ON TRANSFER  Alert and oriented x 4, calm and cooperative, pleasant       CONSULTATIONS  Ortho    PROCEDURES  None    LABORATORY  Lab Results   Component Value Date    SODIUM 133 (L) 01/22/2024    POTASSIUM 4.4 01/22/2024    CHLORIDE 100 01/22/2024    CO2 23 01/22/2024    GLUCOSE 115 (H) 01/22/2024    BUN 12 01/22/2024    CREATININE 0.57 01/22/2024        Lab Results   Component Value Date    WBC 6.5 01/22/2024    HEMOGLOBIN 8.4 (L) 01/22/2024    HEMATOCRIT 25.7 (L) 01/22/2024    PLATELETCT 202 01/22/2024      CT-CHEST (THORAX) WITH   Final Result      1.  No evidence of acute intrathoracic injury.   2.  No displaced rib fractures or pneumothorax.   3.  No evidence of metastatic disease.      Fleischner Society pulmonary nodule recommendations:   Not Applicable         CTA ABDOMEN PELVIS W & W/O POST PROCESS   Final Result         1. No evidence of active bleeding.   2. Stable 9 cm pelvic hematoma.   3. Simple right renal cyst but does not require follow-up.   4. Colonic diverticulosis.   5. Large amount of stool.      CT-PELVIS W/O PLUS RECONS   Final Result         1.  Comminuted right symphyseal and superior pubic ramus fracture   2.  Subtle left acetabular anterior column fracture   3.  Intermediate density anterior lateral within the left pelvis, appearance favoring hematoma. Somewhat displaces the bladder to the right.      DX-WRIST-LIMITED 2- LEFT   Final Result         1.  Comminuted impacted distal radial fracture, partially reduced since prior study   2.  Ulnar styloid fracture      DX-WRIST-COMPLETE 3+ LEFT   Final Result         1.  Comminuted impacted distal radial fracture   2.  Ulnar styloid fracture      DX-PELVIS-1 OR 2 VIEWS   Final Result         1.  No acute traumatic bony  injury.          Total time of the discharge process exceeds 36 minutes.

## 2024-01-22 NOTE — DISCHARGE PLANNING
PM&Referral from  Multiple fracture with ongoing medical management as well as therapy need. Discharge summary completed to discharge to skilled nursing No physiatry consult ordered per protocol.

## 2024-01-22 NOTE — PROGRESS NOTES
Assumed care. Patient awake and alert, sitting up in bed eating dinner. No complaints of pain or discomfort.

## 2024-01-22 NOTE — DISCHARGE PLANNING
Case Management Discharge Planning    Admission Date: 1/18/2024  GMLOS: 3.5  ALOS: 4    6-Clicks ADL Score: 16  6-Clicks Mobility Score: 19  PT and/or OT Eval ordered: Yes  Post-acute Referrals Ordered: Yes  Post-acute Choice Obtained: Yes  Has referral(s) been sent to post-acute provider:  Yes      Anticipated Discharge Dispo: Discharge Disposition: D/T to SNF with Medicare cert in anticipation of skilled care (03)    DME Needed: No    Action(s) Taken: Spoke to pt at bedside. SNF Choice form completed for 1)[facility] 2)[facility] and faxed to [facility].    Spoke to Micki from [facility]. Per Micki, they can accept pt today and can pick pt up at 1630 via their WC van transport.    Pt's PASRR currently in manual review. Micki agreeable to accept pt with PASRR pending if [facility] RNCM follows-up on PASRR.    COBRA placed in pt's chart. Bedside RN notified via Voalte.    Escalations Completed: None    Medically Clear: Yes    Next Steps: DC to [facility] today at 1630, RNCM to follow-up with PASRR tomorrow    Barriers to Discharge: None

## 2024-01-23 LAB — HAPTOGLOB SERPL-MCNC: 183 MG/DL (ref 30–200)

## 2024-01-29 ENCOUNTER — APPOINTMENT (OUTPATIENT)
Dept: MEDICAL GROUP | Facility: LAB | Age: 79
End: 2024-01-29
Payer: COMMERCIAL

## 2024-02-05 DIAGNOSIS — M54.41 CHRONIC RIGHT-SIDED LOW BACK PAIN WITH BILATERAL SCIATICA: ICD-10-CM

## 2024-02-05 DIAGNOSIS — G89.29 CHRONIC RIGHT-SIDED LOW BACK PAIN WITH BILATERAL SCIATICA: ICD-10-CM

## 2024-02-05 DIAGNOSIS — M54.42 CHRONIC RIGHT-SIDED LOW BACK PAIN WITH BILATERAL SCIATICA: ICD-10-CM

## 2024-02-05 RX ORDER — MELOXICAM 15 MG/1
TABLET ORAL
Qty: 90 TABLET | Refills: 3 | Status: SHIPPED | OUTPATIENT
Start: 2024-02-05

## 2024-02-05 NOTE — TELEPHONE ENCOUNTER
Received request via: Pharmacy    Was the patient seen in the last year in this department? Yes 12/14/23    Does the patient have an active prescription (recently filled or refills available) for medication(s) requested? No    [facility]    Does the patient have [facility] Plus and need 100 day supply (blood pressure, diabetes and cholesterol meds only)? Patient does not have SCP

## 2024-02-21 DIAGNOSIS — G89.29 CHRONIC RIGHT-SIDED LOW BACK PAIN WITH BILATERAL SCIATICA: ICD-10-CM

## 2024-02-21 DIAGNOSIS — M54.42 CHRONIC RIGHT-SIDED LOW BACK PAIN WITH BILATERAL SCIATICA: ICD-10-CM

## 2024-02-21 DIAGNOSIS — M54.41 CHRONIC RIGHT-SIDED LOW BACK PAIN WITH BILATERAL SCIATICA: ICD-10-CM

## 2024-02-23 RX ORDER — CYCLOBENZAPRINE HCL 5 MG
TABLET ORAL
Qty: 30 TABLET | Refills: 1 | Status: SHIPPED | OUTPATIENT
Start: 2024-02-23

## 2024-02-23 NOTE — TELEPHONE ENCOUNTER
Received request via: Pharmacy    Was the patient seen in the last year in this department? Yes 12/14/2023    Does the patient have an active prescription (recently filled or refills available) for medication(s) requested? No    [facility]    Does the patient have [facility] and need 100 day supply (blood pressure, diabetes and cholesterol meds only)? Patient does not have SCP

## 2024-02-29 RX ORDER — SERTRALINE HYDROCHLORIDE 100 MG/1
TABLET, FILM COATED ORAL
Qty: 90 TABLET | Refills: 0 | Status: SHIPPED | OUTPATIENT
Start: 2024-02-29

## 2024-03-13 ENCOUNTER — HOSPITAL ENCOUNTER (OUTPATIENT)
Dept: RADIOLOGY | Facility: MEDICAL CENTER | Age: 79
End: 2024-03-13
Attending: FAMILY MEDICINE
Payer: MEDICARE

## 2024-03-13 DIAGNOSIS — Z12.31 VISIT FOR SCREENING MAMMOGRAM: ICD-10-CM

## 2024-03-13 PROCEDURE — 77067 SCR MAMMO BI INCL CAD: CPT

## 2024-03-21 RX ORDER — ALENDRONATE SODIUM 70 MG/1
TABLET ORAL
Qty: 4 TABLET | Refills: 6 | Status: SHIPPED | OUTPATIENT
Start: 2024-03-21

## 2024-03-21 NOTE — TELEPHONE ENCOUNTER
Received request via: Pharmacy    Was the patient seen in the last year in this department? Yes 12/14/2023    Does the patient have an active prescription (recently filled or refills available) for medication(s) requested? No    [facility]    Does the patient have [facility] and need 100 day supply (blood pressure, diabetes and cholesterol meds only)? Patient does not have SCP

## 2024-03-28 DIAGNOSIS — M79.2 NEUROPATHIC PAIN, LEG, LEFT: ICD-10-CM

## 2024-03-29 RX ORDER — GABAPENTIN 300 MG/1
300 CAPSULE ORAL 2 TIMES DAILY
Qty: 180 CAPSULE | Refills: 0 | Status: SHIPPED | OUTPATIENT
Start: 2024-03-29

## 2024-03-29 NOTE — TELEPHONE ENCOUNTER
Received request via: Pharmacy    Was the patient seen in the last year in this department? Yes    Does the patient have an active prescription (recently filled or refills available) for medication(s) requested? No    [facility]: [facility]    Does the patient have [facility] Plus and need 100 day supply (blood pressure, diabetes and cholesterol meds only)? Patient does not have SCP

## 2024-05-03 ENCOUNTER — APPOINTMENT (OUTPATIENT)
Dept: MEDICAL GROUP | Facility: LAB | Age: 79
End: 2024-05-03
Payer: MEDICARE

## 2024-05-03 ENCOUNTER — HOSPITAL ENCOUNTER (OUTPATIENT)
Dept: LAB | Facility: MEDICAL CENTER | Age: 79
End: 2024-05-03
Attending: FAMILY MEDICINE
Payer: MEDICARE

## 2024-05-03 VITALS
SYSTOLIC BLOOD PRESSURE: 130 MMHG | DIASTOLIC BLOOD PRESSURE: 70 MMHG | WEIGHT: 152 LBS | BODY MASS INDEX: 26.93 KG/M2 | OXYGEN SATURATION: 96 % | HEART RATE: 72 BPM | RESPIRATION RATE: 16 BRPM | HEIGHT: 63 IN
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
## 2024-05-03 DIAGNOSIS — Z09 HOSPITAL DISCHARGE FOLLOW-UP: ICD-10-CM

## 2024-05-03 DIAGNOSIS — D64.9 ANEMIA, UNSPECIFIED TYPE: ICD-10-CM

## 2024-05-03 LAB
BASOPHILS # BLD AUTO: 1.4 % (ref 0–1.8)
BASOPHILS # BLD: 0.08 K/UL (ref 0–0.12)
EOSINOPHIL # BLD AUTO: 0.18 K/UL (ref 0–0.51)
EOSINOPHIL NFR BLD: 3.1 % (ref 0–6.9)
ERYTHROCYTE [DISTWIDTH] IN BLOOD BY AUTOMATED COUNT: 42.2 FL (ref 35.9–50)
HCT VFR BLD AUTO: 40.9 % (ref 37–47)
HGB BLD-MCNC: 13.6 G/DL (ref 12–16)
IMM GRANULOCYTES # BLD AUTO: 0.01 K/UL (ref 0–0.11)
IMM GRANULOCYTES NFR BLD AUTO: 0.2 % (ref 0–0.9)
LYMPHOCYTES # BLD AUTO: 1.38 K/UL (ref 1–4.8)
LYMPHOCYTES NFR BLD: 23.7 % (ref 22–41)
MCH RBC QN AUTO: 31.6 PG (ref 27–33)
MCHC RBC AUTO-ENTMCNC: 33.3 G/DL (ref 32.2–35.5)
MCV RBC AUTO: 94.9 FL (ref 81.4–97.8)
MONOCYTES # BLD AUTO: 0.6 K/UL (ref 0–0.85)
MONOCYTES NFR BLD AUTO: 10.3 % (ref 0–13.4)
NEUTROPHILS # BLD AUTO: 3.57 K/UL (ref 1.82–7.42)
NEUTROPHILS NFR BLD: 61.3 % (ref 44–72)
NRBC # BLD AUTO: 0 K/UL
NRBC BLD-RTO: 0 /100 WBC (ref 0–0.2)
PLATELET # BLD AUTO: 264 K/UL (ref 164–446)
PMV BLD AUTO: 9.4 FL (ref 9–12.9)
RBC # BLD AUTO: 4.31 M/UL (ref 4.2–5.4)
WBC # BLD AUTO: 5.8 K/UL (ref 4.8–10.8)

## 2024-05-03 PROCEDURE — 99214 OFFICE O/P EST MOD 30 MIN: CPT | Performed by: FAMILY MEDICINE

## 2024-05-03 PROCEDURE — 3075F SYST BP GE 130 - 139MM HG: CPT | Performed by: FAMILY MEDICINE

## 2024-05-03 PROCEDURE — 3078F DIAST BP <80 MM HG: CPT | Performed by: FAMILY MEDICINE

## 2024-05-03 ASSESSMENT — FIBROSIS 4 INDEX: FIB4 SCORE: 2.17

## 2024-05-03 NOTE — PROGRESS NOTES
Chief Complaint:   Chief Complaint   Patient presents with    Requesting Labs     Followup from hospital - low hemoglobin       HPI:Established patient   Alice Whitlock is a 78 y.o. female who presents for recent hospitalization for fall and short-term rehab discharge    1. Fall, subsequent encounter  Fell at ground level at home after tripping on her pet.  Admitted to the hospital, full workup showed evidence of a small pelvic hematoma, with drop in her hemoglobin.  Patient was discharged to a short-term rehab, she states she is just feeling tired but she is back to her work at this time 3 times a week, denies dizziness or other concerns, vital signs are stable today.    2. Hospital discharge follow-up  Reviewed hospital records, CT workup which shows pelvic hematoma over 9 cm, drop of her hemoglobin during the hospital stay.  Patient reports tiredness, denies chest pain or shortness of breath.    3. Anemia, unspecified type  Patient was admitted to the hospital on January 18 with a hemoglobin of 12.3, dropped her hemoglobin on discharge to 8.1, reports tiredness and fatigue started on iron supplements, patient had a pelvic small hematoma shown on her CT after the recent fall.  Denies GI symptoms no epigastric pain, no change in the color of stool or vomiting blood or epigastric pain.  No blood in stool.  She is up-to-date on her colon cancer screening.          Past medical history, family history, social history and medications reviewed and updated in the record. Today   Current medications, problem list and allergies reviewed in [facility]  [employer] maintenance topics are reviewed and updated.    Patient Active Problem List    Diagnosis Date Noted    History of uterine cancer 01/06/2022    Acute blood loss anemia 01/21/2024    Pelvic hematoma, female 01/20/2024    Pubic ramus fracture, right, closed, initial encounter ([facility]) 01/18/2024    Fall 01/18/2024    Closed nondisplaced fracture of anterior column of left  acetabulum (HCC) 01/18/2024    Closed fracture of distal end of left radius 01/18/2024    Malignant neoplasm of endometrium (HCC) 10/06/2023    Recurrent major depressive disorder, in full remission ([facility]) 06/06/2023    Atherosclerosis of aorta ([facility]) 04/19/2023    Hyperparathyroidism ([facility]) 04/14/2023    Pain of right thumb 09/29/2022    Arthralgia of back 01/20/2022    Thyroid dysfunction 01/06/2022    Neuropathic pain, leg, left 10/02/2018    Primary localized osteoarthrosis of the hip 09/21/2018    Osteoporosis, post-menopausal 09/10/2018    Post-surgical hypothyroidism 09/10/2018    LBBB (left bundle branch block) 10/21/2016    Left knee pain 10/11/2016    FH: CAD (coronary artery disease) 08/19/2015    GERD (gastroesophageal reflux disease) 07/09/2014    Hyperlipidemia 07/09/2014    Mixed stress and urge urinary incontinence 07/09/2014    Chronic depression 07/08/2013    History of total knee arthroplasty 07/08/2013    Total knee replacement status, left 07/08/2013    OA (osteoarthritis) of knee 11/06/2012     Family History   Problem Relation Age of Onset    Heart Disease Other     Cancer Other     Hypertension Other     Cancer Maternal Aunt         breast    Heart Disease Mother      Social History     Socioeconomic History    Marital status:      Spouse name: Not on file    Number of children: Not on file    Years of education: Not on file    Highest education level: Not on file   Occupational History    Not on file   Tobacco Use    Smoking status: Never    Smokeless tobacco: Never   Vaping Use    Vaping Use: Never used   Substance and Sexual Activity    Alcohol use: Not Currently     Comment: 2 a month    Drug use: No    Sexual activity: Yes     Partners: Male     Comment:     Other Topics Concern    Not on file   Social History Narrative    Not on file     Social Determinants of Health     Financial Resource Strain: Not on file   Food Insecurity: Not on file   Transportation Needs: Not on file    Physical Activity: Not on file   Stress: Not on file   Social Connections: Not on file   Intimate Partner Violence: Not on file   Housing Stability: Not on file       Current Outpatient Medications   Medication Sig Dispense Refill    gabapentin (NEURONTIN) 300 MG Cap Take 1 capsule by mouth twice daily 180 Capsule 0    alendronate (FOSAMAX) 70 MG Tab TAKE 1 TABLET BY MOUTH ONCE A WEEK IN THE MORNING WITH A FULL GLASS OF WATER, 30 MINUTES BEFORE THE FIRST MEAL, BEVERAGE OR MEDICATION OF THE DAY. REMAIN UPRIGHT 4 Tablet 6    sertraline (ZOLOFT) 100 MG Tab Take 1 tablet by mouth once daily 90 Tablet 0    cyclobenzaprine (FLEXERIL) 5 mg tablet TAKE 1 TABLET BY MOUTH ONCE DAILY AS NEEDED FOR MUSCLE SPASM OR  MODERATE  PAIN 30 Tablet 1    meloxicam (MOBIC) 15 MG tablet Take 1 tablet by mouth once daily 90 Tablet 3    ferrous sulfate 325 (65 Fe) MG tablet Take 1 Tablet by mouth every 48 hours. 30 Tablet     lactulose 20 GM/30ML Solution Take 15 mL by mouth 2 times a day. 450 mL     ondansetron (ZOFRAN ODT) 4 MG TABLET DISPERSIBLE Take 1 Tablet by mouth every four hours as needed for Nausea/Vomiting (give PO if IV route is unavailable.). 10 Tablet 0    acetaminophen (TYLENOL) 325 MG Tab Take 2 Tablets by mouth every 6 hours as needed for Mild Pain or Fever. Do not exceed total acetaminophen dose of 4000mg in 24 hours 30 Tablet 0    omeprazole (PRILOSEC) 20 MG delayed-release capsule Take 1 Capsule by mouth every day.      polyethylene glycol/lytes (MIRALAX) Pack Take 1 Packet by mouth 1 time a day as needed (if sennosides and docusate ineffective after 24 hours).  3    bisacodyl (DULCOLAX) 10 MG Suppos Insert 1 Suppository into the rectum 1 time a day as needed (if magnesium hydroxide ineffective after 24 hours).  0    famotidine (PEPCID) 20 MG Tab Take 1 Tablet by mouth 3 times a day as needed (30 minutes before or after meals for acid reflux).      rosuvastatin (CRESTOR) 10 MG Tab Take 1 Tablet by mouth every evening.  "30 Tablet 11    sennosides-docusate sodium (SENOKOT-S) 8.6-50 MG tablet Take 1 Tablet by mouth every day. 30 Tablet 11    levothyroxine (SYNTHROID) 88 MCG Tab Take 1 tablet by mouth once daily 90 Tablet 3    prednisoLONE sodium phosphate (INFLAMASE FORTE) 1 % Solution       diclofenac sodium (VOLTAREN) 1 % Gel       ascorbic acid (VITAMIN C) 1000 MG tablet Take  by mouth.      Fluticasone Propionate (FLONASE NA) Administer  into affected nostril(S).      Cyanocobalamin (VITAMIN B-12 PO) Take 1 Tab by mouth every day.      vitamin D, Ergocalciferol, (DRISDOL) 68964 UNITS Cap capsule Take 50,000 Units by mouth every 7 days.       No current facility-administered medications for this visit.         Review Of Systems  As documented in HPI above  PHYSICAL EXAMINATION:    /70 (BP Location: Right arm, Patient Position: Sitting, BP Cuff Size: Adult)   Pulse 72   Resp 16   Ht 1.6 m (5' 3\")   Wt 68.9 kg (152 lb)   SpO2 96%   BMI 26.93 kg/m²   Gen.: Well-developed, well-nourished, no apparent distress, pleasant and cooperative with the examination  HEENT: Normocephalic/atraumatic, sinuses nontender with palpation, TMs clear, nares patent with pink mucosa and clear rhinorrhea, oropharynx clear  Neck: No JVD or bruits, no adenopathy  Cor: Regular rate and rhythm without murmur gallop or rub  Lungs: Clear to auscultation with equal breath sounds bilaterally. No wheezes, rhonchi.  Abdomen: Soft nontender without hepatosplenomegaly or masses appreciated, normoactive bowel sounds  Extremities: No cyanosis, clubbing or edema         ASSESSMENT/Plan:     1. Fall, subsequent encounter  Reviewed hospital records, discussed fall risk assessment and fall prevention    2. Hospital discharge follow-up  Hospital records reviewed stable clinically at this time, will further investigate causes of anemia    3. Anemia, unspecified type  Most likely related to blood loss, CT showed evidence of 9 cm pelvic hematoma, denies concerns at " this time other than tiredness, repeat hemoglobin for further evaluation and assessment.  If still dropping, will investigate causes of blood loss.  And possibly refer her to for GI for upper and lower endoscopy  - CBC WITH DIFFERENTIAL; Future        Please note that this dictation was created using voice recognition software. I have made every reasonable attempt to correct obvious errors but there may be errors of grammar and content that I may have overlooked prior to finalization of this note.

## 2024-05-20 DIAGNOSIS — M54.42 CHRONIC RIGHT-SIDED LOW BACK PAIN WITH BILATERAL SCIATICA: ICD-10-CM

## 2024-05-20 DIAGNOSIS — G89.29 CHRONIC RIGHT-SIDED LOW BACK PAIN WITH BILATERAL SCIATICA: ICD-10-CM

## 2024-05-20 DIAGNOSIS — M54.41 CHRONIC RIGHT-SIDED LOW BACK PAIN WITH BILATERAL SCIATICA: ICD-10-CM

## 2024-05-20 NOTE — TELEPHONE ENCOUNTER
Received request via: Pharmacy    Was the patient seen in the last year in this department? Yes 5/3/24    Does the patient have an active prescription (recently filled or refills available) for medication(s) requested? No    [facility]    Does the patient have [facility] and need 100 day supply (blood pressure, diabetes and cholesterol meds only)? Patient does not have SCP

## 2024-05-21 ENCOUNTER — OFFICE VISIT (OUTPATIENT)
Dept: MEDICAL GROUP | Facility: LAB | Age: 79
End: 2024-05-21
Payer: MEDICARE

## 2024-05-21 VITALS
WEIGHT: 153 LBS | HEART RATE: 68 BPM | HEIGHT: 63 IN | RESPIRATION RATE: 16 BRPM | OXYGEN SATURATION: 96 % | SYSTOLIC BLOOD PRESSURE: 130 MMHG | TEMPERATURE: 98 F | BODY MASS INDEX: 27.11 KG/M2 | DIASTOLIC BLOOD PRESSURE: 70 MMHG

## 2024-05-21 DIAGNOSIS — R20.0 NUMBNESS OF HAND: ICD-10-CM
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
## 2024-05-21 DIAGNOSIS — D64.9 ANEMIA, UNSPECIFIED TYPE: ICD-10-CM

## 2024-05-21 PROCEDURE — 99214 OFFICE O/P EST MOD 30 MIN: CPT | Performed by: FAMILY MEDICINE

## 2024-05-21 PROCEDURE — 3075F SYST BP GE 130 - 139MM HG: CPT | Performed by: FAMILY MEDICINE

## 2024-05-21 PROCEDURE — 3078F DIAST BP <80 MM HG: CPT | Performed by: FAMILY MEDICINE

## 2024-05-21 RX ORDER — CYCLOBENZAPRINE HCL 5 MG
TABLET ORAL
Qty: 30 TABLET | Refills: 1 | Status: SHIPPED | OUTPATIENT
Start: 2024-05-21

## 2024-05-21 ASSESSMENT — FIBROSIS 4 INDEX: FIB4 SCORE: 1.66

## 2024-05-21 NOTE — PROGRESS NOTES
"Chief Complaint   Patient presents with    Follow-Up       Verbal consent was acquired by the patient to use [employer] ambient listening note generation during this visit Yes      HPI:  History of Present Illness  The patient presents for evaluation of multiple medical concerns.      Status post recent hospitalization for fall with admission to short-term rehab,    The patient reports no current concerns and has resumed work, albeit with residual fatigue post-work.   Right hand numbness:  Status post wrist fracture that has been followed up by orthopedics.  Patient has been referred to see a hand specialist    She underwent a nerve conduction study a few weeks prior and has a scheduled appointment with a hand specialist on 06/07/2024 due to persistent tingling sensations. She possesses a wrist brace for support. Prior to her fall, she experienced thumb pain, reminiscent of arthritis, which has since resolved. She initially attributed her symptoms to carpal tunnel syndrome and adhered to caution when sleeping with her wrist straight. Her physician suggested the use of a brace during sleep to prevent flexion. She attempts to flex her wrist in the morning, but struggles to do so in the morning. Her previous occupation, which involved opening closets in plastic, has ceased. However, she reports an improvement in her condition, enabling her to reach out and close her car door, a change from her previous experience of clicking and discomfort. She acknowledges weight gain during her stay in a nursing home. Currently, she is working 2 to 3 days a week, down from 5 days a week. She occasionally experiences nocturnal awakenings due to severe hand numbness.              Exam:         /70 (BP Location: Right arm, Patient Position: Sitting, BP Cuff Size: Adult)   Pulse 68   Temp 36.7 °C (98 °F) (Temporal)   Resp 16   Ht 1.6 m (5' 3\")   Wt 69.4 kg (153 lb)   SpO2 96%   BMI 27.10 kg/m²   Gen.: Well-developed, " well-nourished, no apparent distress, pleasant and cooperative with the examination  HEENT: Normocephalic/atraumatic,         Extremities: No cyanosis, clubbing or edema  Right upper extremity exam, deformity at the wrist area related to recent fracture, mild atrophy of the hand muscles, deformities in her fingers related to osteoarthritis  Assessment & Plan    1. Numbness of hand  Most likely related to carpal tunnel, patient will follow-up with hand specialist for further evaluation of the deformity and possible carpal tunnel release, confirmed diagnosis by EMG study.    2. Fall, subsequent encounter  Recovering well no concerns at this time patient is getting back to her baseline of health.    3. Anemia, unspecified type  Resolved at this time hemoglobin is back to normal.      Please note that this dictation was created using voice recognition software. I have made every reasonable attempt to correct obvious errors but there may be errors of grammar and content that I may have overlooked prior to finalization of this note.

## 2024-05-31 NOTE — TELEPHONE ENCOUNTER
Received request via: Pharmacy    Was the patient seen in the last year in this department? Yes5/21/24    Does the patient have an active prescription (recently filled or refills available) for medication(s) requested? No    Pharmacy Name: [facility]    Does the patient have [facility] and need 100 day supply (blood pressure, diabetes and cholesterol meds only)? Medication is not for cholesterol, blood pressure or diabetes

## 2024-06-02 RX ORDER — SERTRALINE HYDROCHLORIDE 100 MG/1
TABLET, FILM COATED ORAL
Qty: 90 TABLET | Refills: 0 | Status: SHIPPED | OUTPATIENT
Start: 2024-06-02

## 2024-07-03 ENCOUNTER — DOCUMENTATION (OUTPATIENT)
Dept: HEALTH INFORMATION MANAGEMENT | Facility: OTHER | Age: 79
End: 2024-07-03
Payer: MEDICARE

## 2024-07-12 ENCOUNTER — HOSPITAL ENCOUNTER (OUTPATIENT)
Dept: LAB | Facility: MEDICAL CENTER | Age: 79
End: 2024-07-12
Attending: PHYSICIAN ASSISTANT
Payer: MEDICARE

## 2024-07-12 LAB
ANION GAP SERPL CALC-SCNC: 14 MMOL/L (ref 7–16)
BUN SERPL-MCNC: 16 MG/DL (ref 8–22)
CALCIUM SERPL-MCNC: 10.5 MG/DL (ref 8.5–10.5)
CHLORIDE SERPL-SCNC: 100 MMOL/L (ref 96–112)
CO2 SERPL-SCNC: 23 MMOL/L (ref 20–33)
CREAT SERPL-MCNC: 0.69 MG/DL (ref 0.5–1.4)
GFR SERPLBLD CREATININE-BSD FMLA CKD-EPI: 89 ML/MIN/1.73 M 2
GLUCOSE SERPL-MCNC: 100 MG/DL (ref 65–99)
POTASSIUM SERPL-SCNC: 4.4 MMOL/L (ref 3.6–5.5)
SODIUM SERPL-SCNC: 137 MMOL/L (ref 135–145)

## 2024-07-12 PROCEDURE — 36415 COLL VENOUS BLD VENIPUNCTURE: CPT

## 2024-07-12 PROCEDURE — 80048 BASIC METABOLIC PNL TOTAL CA: CPT

## 2024-07-16 ENCOUNTER — HOSPITAL ENCOUNTER (OUTPATIENT)
Dept: RADIOLOGY | Facility: MEDICAL CENTER | Age: 79
End: 2024-07-16
Attending: PHYSICIAN ASSISTANT
Payer: MEDICARE

## 2024-07-16 DIAGNOSIS — C54.1 MALIGNANT NEOPLASM OF ENDOMETRIUM (HCC): ICD-10-CM

## 2024-07-16 PROCEDURE — 700117 HCHG RX CONTRAST REV CODE 255: Performed by: PHYSICIAN ASSISTANT

## 2024-07-16 PROCEDURE — 71260 CT THORAX DX C+: CPT

## 2024-07-16 RX ADMIN — IOHEXOL 100 ML: 350 INJECTION, SOLUTION INTRAVENOUS at 13:11

## 2024-07-25 DIAGNOSIS — M54.41 CHRONIC RIGHT-SIDED LOW BACK PAIN WITH BILATERAL SCIATICA: ICD-10-CM

## 2024-07-25 DIAGNOSIS — G89.29 CHRONIC RIGHT-SIDED LOW BACK PAIN WITH BILATERAL SCIATICA: ICD-10-CM

## 2024-07-25 DIAGNOSIS — M54.42 CHRONIC RIGHT-SIDED LOW BACK PAIN WITH BILATERAL SCIATICA: ICD-10-CM

## 2024-07-25 RX ORDER — CYCLOBENZAPRINE HCL 5 MG
TABLET ORAL
Qty: 30 TABLET | Refills: 1 | Status: SHIPPED | OUTPATIENT
Start: 2024-07-25 | End: 2024-07-30 | Stop reason: SDUPTHER

## 2024-07-30 ENCOUNTER — OFFICE VISIT (OUTPATIENT)
Dept: MEDICAL GROUP | Facility: LAB | Age: 79
End: 2024-07-30
Payer: MEDICARE

## 2024-07-30 VITALS
DIASTOLIC BLOOD PRESSURE: 68 MMHG | BODY MASS INDEX: 26.17 KG/M2 | RESPIRATION RATE: 18 BRPM | SYSTOLIC BLOOD PRESSURE: 124 MMHG | HEART RATE: 62 BPM | HEIGHT: 63 IN | WEIGHT: 147.71 LBS | TEMPERATURE: 96.9 F | OXYGEN SATURATION: 100 %

## 2024-07-30 DIAGNOSIS — F32.A CHRONIC DEPRESSION: ICD-10-CM

## 2024-07-30 DIAGNOSIS — Z76.89 ENCOUNTER TO ESTABLISH CARE: ICD-10-CM

## 2024-07-30 DIAGNOSIS — G89.29 CHRONIC RIGHT-SIDED LOW BACK PAIN WITH BILATERAL SCIATICA: ICD-10-CM

## 2024-07-30 DIAGNOSIS — M79.2 NEUROPATHIC PAIN, LEG, LEFT: ICD-10-CM

## 2024-07-30 DIAGNOSIS — M54.41 CHRONIC RIGHT-SIDED LOW BACK PAIN WITH BILATERAL SCIATICA: ICD-10-CM

## 2024-07-30 DIAGNOSIS — E21.3 HYPERPARATHYROIDISM (HCC): ICD-10-CM

## 2024-07-30 DIAGNOSIS — M81.0 OSTEOPOROSIS, UNSPECIFIED OSTEOPOROSIS TYPE, UNSPECIFIED PATHOLOGICAL FRACTURE PRESENCE: ICD-10-CM

## 2024-07-30 DIAGNOSIS — E78.5 HYPERLIPIDEMIA, UNSPECIFIED HYPERLIPIDEMIA TYPE: ICD-10-CM

## 2024-07-30 DIAGNOSIS — M54.42 CHRONIC RIGHT-SIDED LOW BACK PAIN WITH BILATERAL SCIATICA: ICD-10-CM

## 2024-07-30 PROBLEM — Z85.820 HISTORY OF MELANOMA: Status: ACTIVE | Noted: 2024-07-30
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
## 2024-07-30 PROBLEM — D62 ACUTE BLOOD LOSS ANEMIA: Status: RESOLVED | Noted: 2024-01-21 | Resolved: 2024-07-30

## 2024-07-30 RX ORDER — ALENDRONATE SODIUM 70 MG/1
TABLET ORAL
Qty: 12 TABLET | Refills: 3 | Status: SHIPPED | OUTPATIENT
Start: 2024-07-30

## 2024-07-30 RX ORDER — CYCLOBENZAPRINE HCL 5 MG
TABLET ORAL
Qty: 90 TABLET | Refills: 1 | Status: SHIPPED | OUTPATIENT
Start: 2024-07-30

## 2024-07-30 RX ORDER — VITAMIN B COMPLEX
1000 TABLET ORAL DAILY
COMMUNITY

## 2024-07-30 RX ORDER — SERTRALINE HYDROCHLORIDE 100 MG/1
100 TABLET, FILM COATED ORAL DAILY
Qty: 90 TABLET | Refills: 3 | Status: SHIPPED | OUTPATIENT
Start: 2024-07-30

## 2024-07-30 RX ORDER — GABAPENTIN 300 MG/1
300 CAPSULE ORAL 2 TIMES DAILY
Qty: 180 CAPSULE | Refills: 0 | Status: SHIPPED | OUTPATIENT
Start: 2024-07-30

## 2024-07-30 RX ORDER — ROSUVASTATIN CALCIUM 10 MG/1
10 TABLET, COATED ORAL EVERY EVENING
Qty: 90 TABLET | Refills: 3 | Status: SHIPPED | OUTPATIENT
Start: 2024-07-30

## 2024-07-30 ASSESSMENT — PATIENT HEALTH QUESTIONNAIRE - PHQ9
2. FEELING DOWN, DEPRESSED, IRRITABLE, OR HOPELESS: NOT AT ALL
5. POOR APPETITE OR OVEREATING: NOT AT ALL
SUM OF ALL RESPONSES TO PHQ QUESTIONS 1-9: 0
9. THOUGHTS THAT YOU WOULD BE BETTER OFF DEAD, OR OF HURTING YOURSELF: NOT AT ALL
SUM OF ALL RESPONSES TO PHQ9 QUESTIONS 1 AND 2: 0
1. LITTLE INTEREST OR PLEASURE IN DOING THINGS: NOT AT ALL
6. FEELING BAD ABOUT YOURSELF - OR THAT YOU ARE A FAILURE OR HAVE LET YOURSELF OR YOUR FAMILY DOWN: NOT AL ALL
4. FEELING TIRED OR HAVING LITTLE ENERGY: NOT AT ALL
7. TROUBLE CONCENTRATING ON THINGS, SUCH AS READING THE NEWSPAPER OR WATCHING TELEVISION: NOT AT ALL
8. MOVING OR SPEAKING SO SLOWLY THAT OTHER PEOPLE COULD HAVE NOTICED. OR THE OPPOSITE, BEING SO FIGETY OR RESTLESS THAT YOU HAVE BEEN MOVING AROUND A LOT MORE THAN USUAL: NOT AT ALL
3. TROUBLE FALLING OR STAYING ASLEEP OR SLEEPING TOO MUCH: NOT AT ALL

## 2024-07-30 ASSESSMENT — FIBROSIS 4 INDEX: FIB4 SCORE: 1.66

## 2024-08-15 ENCOUNTER — HOSPITAL ENCOUNTER (OUTPATIENT)
Dept: LAB | Facility: MEDICAL CENTER | Age: 79
End: 2024-08-15
Attending: PHYSICIAN ASSISTANT
Payer: MEDICARE

## 2024-08-15 DIAGNOSIS — E78.5 HYPERLIPIDEMIA, UNSPECIFIED HYPERLIPIDEMIA TYPE: ICD-10-CM

## 2024-08-15 DIAGNOSIS — M81.0 OSTEOPOROSIS, UNSPECIFIED OSTEOPOROSIS TYPE, UNSPECIFIED PATHOLOGICAL FRACTURE PRESENCE: ICD-10-CM

## 2024-08-15 DIAGNOSIS — E21.3 HYPERPARATHYROIDISM (HCC): ICD-10-CM

## 2024-08-15 LAB
25(OH)D3 SERPL-MCNC: 35 NG/ML (ref 30–100)
ALBUMIN SERPL BCP-MCNC: 4.4 G/DL (ref 3.2–4.9)
ALBUMIN/GLOB SERPL: 1.5 G/DL
ALP SERPL-CCNC: 81 U/L (ref 30–99)
ALT SERPL-CCNC: 15 U/L (ref 2–50)
ANION GAP SERPL CALC-SCNC: 12 MMOL/L (ref 7–16)
AST SERPL-CCNC: 27 U/L (ref 12–45)
BASOPHILS # BLD AUTO: 1.7 % (ref 0–1.8)
BASOPHILS # BLD: 0.1 K/UL (ref 0–0.12)
BILIRUB SERPL-MCNC: 0.6 MG/DL (ref 0.1–1.5)
BUN SERPL-MCNC: 15 MG/DL (ref 8–22)
CALCIUM ALBUM COR SERPL-MCNC: 9.9 MG/DL (ref 8.5–10.5)
CALCIUM SERPL-MCNC: 10.2 MG/DL (ref 8.5–10.5)
CHLORIDE SERPL-SCNC: 101 MMOL/L (ref 96–112)
CHOLEST SERPL-MCNC: 136 MG/DL (ref 100–199)
CO2 SERPL-SCNC: 23 MMOL/L (ref 20–33)
CREAT SERPL-MCNC: 0.58 MG/DL (ref 0.5–1.4)
EOSINOPHIL # BLD AUTO: 0.22 K/UL (ref 0–0.51)
EOSINOPHIL NFR BLD: 3.8 % (ref 0–6.9)
ERYTHROCYTE [DISTWIDTH] IN BLOOD BY AUTOMATED COUNT: 42.8 FL (ref 35.9–50)
FASTING STATUS PATIENT QL REPORTED: NORMAL
GFR SERPLBLD CREATININE-BSD FMLA CKD-EPI: 93 ML/MIN/1.73 M 2
GLOBULIN SER CALC-MCNC: 2.9 G/DL (ref 1.9–3.5)
GLUCOSE SERPL-MCNC: 88 MG/DL (ref 65–99)
HCT VFR BLD AUTO: 39.5 % (ref 37–47)
HDLC SERPL-MCNC: 64 MG/DL
HGB BLD-MCNC: 13.3 G/DL (ref 12–16)
IMM GRANULOCYTES # BLD AUTO: 0.01 K/UL (ref 0–0.11)
IMM GRANULOCYTES NFR BLD AUTO: 0.2 % (ref 0–0.9)
LDLC SERPL CALC-MCNC: 62 MG/DL
LYMPHOCYTES # BLD AUTO: 1.09 K/UL (ref 1–4.8)
LYMPHOCYTES NFR BLD: 18.6 % (ref 22–41)
MCH RBC QN AUTO: 33 PG (ref 27–33)
MCHC RBC AUTO-ENTMCNC: 33.7 G/DL (ref 32.2–35.5)
MCV RBC AUTO: 98 FL (ref 81.4–97.8)
MONOCYTES # BLD AUTO: 0.45 K/UL (ref 0–0.85)
MONOCYTES NFR BLD AUTO: 7.7 % (ref 0–13.4)
NEUTROPHILS # BLD AUTO: 3.98 K/UL (ref 1.82–7.42)
NEUTROPHILS NFR BLD: 68 % (ref 44–72)
NRBC # BLD AUTO: 0 K/UL
NRBC BLD-RTO: 0 /100 WBC (ref 0–0.2)
PLATELET # BLD AUTO: 272 K/UL (ref 164–446)
PMV BLD AUTO: 9 FL (ref 9–12.9)
POTASSIUM SERPL-SCNC: 4.2 MMOL/L (ref 3.6–5.5)
PROT SERPL-MCNC: 7.3 G/DL (ref 6–8.2)
RBC # BLD AUTO: 4.03 M/UL (ref 4.2–5.4)
SODIUM SERPL-SCNC: 136 MMOL/L (ref 135–145)
TRIGL SERPL-MCNC: 49 MG/DL (ref 0–149)
TSH SERPL DL<=0.005 MIU/L-ACNC: 4.82 UIU/ML (ref 0.38–5.33)
WBC # BLD AUTO: 5.9 K/UL (ref 4.8–10.8)

## 2024-08-15 PROCEDURE — 80053 COMPREHEN METABOLIC PANEL: CPT

## 2024-08-15 PROCEDURE — 82306 VITAMIN D 25 HYDROXY: CPT

## 2024-08-15 PROCEDURE — 84443 ASSAY THYROID STIM HORMONE: CPT

## 2024-08-15 PROCEDURE — 85025 COMPLETE CBC W/AUTO DIFF WBC: CPT

## 2024-08-15 PROCEDURE — 80061 LIPID PANEL: CPT

## 2024-08-15 PROCEDURE — 36415 COLL VENOUS BLD VENIPUNCTURE: CPT

## 2024-09-12 ENCOUNTER — OFFICE VISIT (OUTPATIENT)
Dept: URGENT CARE | Facility: CLINIC | Age: 79
End: 2024-09-12
Payer: MEDICARE

## 2024-09-12 ENCOUNTER — APPOINTMENT (OUTPATIENT)
Dept: RADIOLOGY | Facility: IMAGING CENTER | Age: 79
End: 2024-09-12
Payer: MEDICARE

## 2024-09-12 VITALS
OXYGEN SATURATION: 93 % | RESPIRATION RATE: 16 BRPM | BODY MASS INDEX: 26.58 KG/M2 | SYSTOLIC BLOOD PRESSURE: 138 MMHG | HEIGHT: 63 IN | WEIGHT: 150 LBS | HEART RATE: 73 BPM | DIASTOLIC BLOOD PRESSURE: 84 MMHG | TEMPERATURE: 98 F

## 2024-09-12 DIAGNOSIS — K59.00 CONSTIPATION, UNSPECIFIED CONSTIPATION TYPE: ICD-10-CM

## 2024-09-12 DIAGNOSIS — U07.1 COVID: ICD-10-CM

## 2024-09-12 DIAGNOSIS — R05.8 PRODUCTIVE COUGH: ICD-10-CM

## 2024-09-12 DIAGNOSIS — J45.21 MILD INTERMITTENT REACTIVE AIRWAY DISEASE WITH ACUTE EXACERBATION: ICD-10-CM

## 2024-09-12 PROCEDURE — 3079F DIAST BP 80-89 MM HG: CPT

## 2024-09-12 PROCEDURE — 3075F SYST BP GE 130 - 139MM HG: CPT

## 2024-09-12 PROCEDURE — 99214 OFFICE O/P EST MOD 30 MIN: CPT | Mod: 25

## 2024-09-12 PROCEDURE — 71046 X-RAY EXAM CHEST 2 VIEWS: CPT | Mod: TC,FY

## 2024-09-12 PROCEDURE — 94640 AIRWAY INHALATION TREATMENT: CPT

## 2024-09-12 RX ORDER — DOXYCYCLINE HYCLATE 100 MG
100 TABLET ORAL 2 TIMES DAILY
Qty: 14 TABLET | Refills: 0 | Status: SHIPPED | OUTPATIENT
Start: 2024-09-12 | End: 2024-09-19

## 2024-09-12 RX ORDER — ALBUTEROL SULFATE 90 UG/1
2 INHALANT RESPIRATORY (INHALATION) EVERY 6 HOURS PRN
Qty: 8.5 G | Refills: 0 | Status: SHIPPED | OUTPATIENT
Start: 2024-09-12

## 2024-09-12 RX ORDER — ALBUTEROL SULFATE 0.83 MG/ML
2.5 SOLUTION RESPIRATORY (INHALATION) ONCE
Status: COMPLETED | OUTPATIENT
Start: 2024-09-12 | End: 2024-09-12

## 2024-09-12 RX ADMIN — ALBUTEROL SULFATE 2.5 MG: 0.83 SOLUTION RESPIRATORY (INHALATION) at 13:11

## 2024-09-12 ASSESSMENT — FIBROSIS 4 INDEX: FIB4 SCORE: 2

## 2024-09-12 NOTE — PROGRESS NOTES
Subjective:   Alice Whitlock is a 79 y.o. female who presents for Cough (X5 days, chest congestion, head pressure,and  runny nose. Tested positive for Covid-19 this morning )      HPI:    Patient presents urgent care with concerns of testing positive for COVID while on a  vacation.  States she has had a worsening productive cough in the last 5 days. Coughing up yellow and pink tinged sputum.  Reports continued head pressure, runny nose, nasal congestion as well.  Denies wheezing, shortness of breath. Denies pmh of asthma, copd, smoking. Reports her SpO2 is 93% is her baseline. Denies fever, chills, body aches.  Tolerating oral intake. Reports normal urinary output. Mild improvement with Dayquil. Needs work note.    ROS As above in HPI    Medications:    Current Outpatient Medications on File Prior to Visit   Medication Sig Dispense Refill    vitamin D3 (CHOLECALCIFEROL) 1000 Unit (25 mcg) Tab Take 1,000 Units by mouth every day.      alendronate (FOSAMAX) 70 MG Tab TAKE 1 TABLET BY MOUTH ONCE A WEEK IN THE MORNING WITH A FULL GLASS OF WATER, 30 MINUTES BEFORE THE FIRST MEAL, BEVERAGE OR MEDICATION OF THE DAY. REMAIN UPRIGHT 12 Tablet 3    cyclobenzaprine (FLEXERIL) 5 mg tablet TAKE 1 TABLET BY MOUTH ONCE DAILY AS NEEDED FOR PAIN FOR MUSCLE SPASM 90 Tablet 1    gabapentin (NEURONTIN) 300 MG Cap Take 1 Capsule by mouth 2 times a day. 180 Capsule 0    rosuvastatin (CRESTOR) 10 MG Tab Take 1 Tablet by mouth every evening. 90 Tablet 3    sertraline (ZOLOFT) 100 MG Tab Take 1 Tablet by mouth every day. 90 Tablet 3    meloxicam (MOBIC) 15 MG tablet Take 1 tablet by mouth once daily 90 Tablet 3    ferrous sulfate 325 (65 Fe) MG tablet Take 1 Tablet by mouth every 48 hours. 30 Tablet     acetaminophen (TYLENOL) 325 MG Tab Take 2 Tablets by mouth every 6 hours as needed for Mild Pain or Fever. Do not exceed total acetaminophen dose of 4000mg in 24 hours 30 Tablet 0    omeprazole (PRILOSEC) 20 MG delayed-release  capsule Take 1 Capsule by mouth every day.      polyethylene glycol/lytes (MIRALAX) Pack Take 1 Packet by mouth 1 time a day as needed (if sennosides and docusate ineffective after 24 hours).  3    levothyroxine (SYNTHROID) 88 MCG Tab Take 1 tablet by mouth once daily 90 Tablet 3    diclofenac sodium (VOLTAREN) 1 % Gel       ascorbic acid (VITAMIN C) 1000 MG tablet Take  by mouth.      Fluticasone Propionate (FLONASE NA) Administer  into affected nostril(S).      Cyanocobalamin (VITAMIN B-12 PO) Take 1 Tab by mouth every day.       No current facility-administered medications on file prior to visit.        Allergies:   Penicillins    Problem List:   Patient Active Problem List   Diagnosis    OA (osteoarthritis) of knee    Left knee pain    Chronic depression    FH: CAD (coronary artery disease)    GERD (gastroesophageal reflux disease)    History of uterine cancer    Hyperlipidemia    LBBB (left bundle branch block)    Mixed stress and urge urinary incontinence    Neuropathic pain, leg, left    Osteoporosis, post-menopausal    Post-surgical hypothyroidism    Primary localized osteoarthrosis of the hip    Thyroid dysfunction    History of total knee arthroplasty    Total knee replacement status, left    Arthralgia of back    Pain of right thumb    Hyperparathyroidism (HCC)    Atherosclerosis of aorta (HCC)    Recurrent major depressive disorder, in full remission (HCC)    Malignant neoplasm of endometrium (HCC)    Pubic ramus fracture, right, closed, initial encounter ([facility])    Closed nondisplaced fracture of anterior column of left acetabulum (HCC)    Closed fracture of distal end of left radius    Pelvic hematoma, female    History of melanoma        Surgical History:  Past Surgical History:   Procedure Laterality Date    HYSTERECTOMY ROBOTIC XI  01/17/2019    Procedure: HYSTERECTOMY ROBOTIC XI;  Surgeon: Shahid Paez M.D.;  Location: SURGERY [facility];  Service: Gynecology    SALPINGECTOMY Bilateral  "01/17/2019    Procedure: SALPINGECTOMY;  Surgeon: Shahid Paez M.D.;  Location: SURGERY [facility];  Service: Gynecology    OOPHORECTOMY Bilateral 01/17/2019    Procedure: OOPHORECTOMY;  Surgeon: Shahid Paez M.D.;  Location: SURGERY [facility];  Service: Gynecology    NODE BIOPSY SENTINEL  01/17/2019    Procedure: NODE BIOPSY SENTINEL;  Surgeon: Shahid Paez M.D.;  Location: SURGERY [facility];  Service: Gynecology    KNEE ARTHROPLASTY TOTAL Left 10/11/2016    Procedure: KNEE ARTHROPLASTY TOTAL;  Surgeon: Beverly Chauhan M.D.;  Location: [facility];  Service:     KNEE ARTHROPLASTY TOTAL  11/06/2012    Performed by Beverly Chauhan M.D. at [facility]    KNEE ARTHROSCOPY  01/18/2011    Performed by MARILEE NAGEL at [facility]    MENISCECTOMY, KNEE, MEDIAL  01/18/2011    Performed by MARILEE NAGEL at [facility]    MENISCECTOMY  01/18/2011    Performed by MARILEE NAGEL at [facility]    SYNOVECTOMY  01/18/2011    Performed by MARILEE NAGEL at [facility]    BLADDER SUSPENSION  2000    THYROIDECTOMY TOTAL  1990    MAMMOPLASTY AUGMENTATION  1981    SC BREAST AUGMENTATION WITH IMPLANT  1980    OTHER SURGICAL PROCEDURE  1979    excision thyroid nodules    LAPAROTOMY  1972    endometriosis    TONSILLECTOMY  1957    CATARACT EXTRACTION WITH IOL      2023    WRIST ARTHROSCOPY      carpal tunnel       Past Social Hx:   Social History     Tobacco Use    Smoking status: Never    Smokeless tobacco: Never   Vaping Use    Vaping status: Never Used   Substance Use Topics    Alcohol use: Yes     Comment: 2 a month    Drug use: No          Problem list, medications, and allergies reviewed by myself today in Epic.     Objective:     /84   Pulse 73   Temp 36.7 °C (98 °F) (Temporal)   Resp 16   Ht 1.6 m (5' 3\")   Wt 68 kg (150 lb)   SpO2 93%   BMI 26.57 kg/m²     Physical Exam  Vitals and nursing note reviewed. "   Constitutional:       General: She is not in acute distress.     Appearance: Normal appearance. She is not ill-appearing.   HENT:      Head: Normocephalic.      Right Ear: Tympanic membrane and ear canal normal.      Left Ear: Tympanic membrane and ear canal normal.      Nose: Congestion and rhinorrhea present. Rhinorrhea is clear.      Right Sinus: No maxillary sinus tenderness or frontal sinus tenderness.      Left Sinus: No maxillary sinus tenderness or frontal sinus tenderness.      Mouth/Throat:      Mouth: Mucous membranes are moist.      Pharynx: Oropharynx is clear. Uvula midline. Posterior oropharyngeal erythema and postnasal drip present. No pharyngeal swelling or oropharyngeal exudate.      Tonsils: No tonsillar exudate.   Cardiovascular:      Rate and Rhythm: Normal rate and regular rhythm.      Heart sounds: Normal heart sounds. No murmur heard.     No friction rub. No gallop.   Pulmonary:      Effort: Pulmonary effort is normal. No respiratory distress.      Breath sounds: No stridor. Examination of the right-lower field reveals decreased breath sounds and wheezing. Examination of the left-lower field reveals decreased breath sounds and wheezing. Decreased breath sounds and wheezing present. No rhonchi or rales.   Chest:      Chest wall: No tenderness.   Abdominal:      General: Bowel sounds are normal.      Palpations: Abdomen is soft.   Musculoskeletal:      Cervical back: No rigidity or tenderness.   Lymphadenopathy:      Cervical: No cervical adenopathy.   Skin:     General: Skin is warm and dry.      Capillary Refill: Capillary refill takes less than 2 seconds.      Findings: No rash.   Neurological:      Mental Status: She is alert and oriented to person, place, and time.         Assessment/Plan:     DX-CHEST-2 VIEWS 09/12/2024    Narrative  9/12/2024 12:12 PM    HISTORY/REASON FOR EXAM:  Cough; R/o pneumonia, Covid+, productive cough, pink sputum.    TECHNIQUE/EXAM DESCRIPTION AND NUMBER OF  VIEWS:  Two views of the chest.    COMPARISON:  Chest radiography, 1/14/2019. CT of the chest, abdomen and pelvis, 7/16/2024.    FINDINGS:  The cardiomediastinal silhouette is normal in size.  No pulmonary infiltrates or consolidations are noted.  No pleural effusions are appreciated.  No visible pneumothorax.  Stable bilateral breast implants and postsurgical changes in the right neck soft tissues. Large amount of stool in the imaged portions of the colon.  Decreased bone mineralization with multilevel thoracic spondylosis.    Impression  1. No acute findings.  2. Other stable chronic degenerative and postsurgical changes.  3. Large amount of stool in the imaged portions of colon. Correlate clinically for constipation.        Diagnosis and associated orders:   1. COVID    2. Productive cough  - DX-CHEST-2 VIEWS; Future  - albuterol (Proventil) 2.5mg/3ml nebulizer solution 2.5 mg  - doxycycline (VIBRAMYCIN) 100 MG Tab; Take 1 Tablet by mouth 2 times a day for 7 days.  Dispense: 14 Tablet; Refill: 0    3. Mild intermittent reactive airway disease with acute exacerbation  - albuterol 108 (90 Base) MCG/ACT Aero Soln inhalation aerosol; Inhale 2 Puffs every 6 hours as needed for Shortness of Breath.  Dispense: 8.5 g; Refill: 0    4. Constipation, unspecified constipation type      Comments/MDM:       Viral testing deferred due to duration of symptoms.  Patient states she tested positive for COVID while on a  cruise.  Chest x-ray obtained due to reports of pink-tan sputum,, productivity of cough.  Chest x-ray is negative for acute findings.  Incident dentally, and there is a large amount of stool reported by the radiologist.  Patient reports she has a previous medical history of constipation for most of her life.  She states she uses laxatives and has not had a chance to take laxatives yet as she is read just returned from her vacation yesterday.  Denies nausea, vomiting, diarrhea, decreased flatus, belching.   Denies abdominal pain.  SBO's 2 is 93%, patient received nebulized albuterol in office.  After which she reports improvement of her symptoms.  She continues to expectorate large quantities of mucus when coughing.  SpO2 to recheck is 97%.  Will order albuterol.  Doxycycline ordered in case patient develops fever in the next 2 to 3 days.   Supportive measures encouraged: Rest, increased oral hydration, NSAIDs/tylenol as needed per package instructions, decongestant, warm humidification, otc antihistamines, Flonase, Mucinex as needed   Strict return to ER precautions reviewed  Follow-up with primary care advised  Work note provided        Return to clinic or go to ED if symptoms worsen or persist. Indications for ED discussed at length. Patient/Parent/Guardian voices understanding. Follow-up with your primary care provider in 3-5 days. Red flag symptoms discussed. All side effects of medication discussed including allergic response, GI upset, tendon injury, rash, sedation etc.    Please note that this dictation was created using voice recognition software. I have made a reasonable attempt to correct obvious errors, but I expect that there are errors of grammar and possibly content that I did not discover before finalizing the note.    This note was electronically signed by AQUILES Nathan

## 2024-09-12 NOTE — LETTER
September 12, 2024        To Whom It May Concern:         This is confirmation that Alice CAROLYN Whitlock attended her scheduled appointment with MATT Mclean on 9/12/24.    Please excuse patient from work until Monday 09/16/24 due to illness.         If you have any questions please do not hesitate to call me at the phone number listed below.    Sincerely,      KELSIE Mclean.  [phone]

## 2024-10-02 RX ORDER — LEVOTHYROXINE SODIUM 88 UG/1
88 TABLET ORAL DAILY
Qty: 90 TABLET | Refills: 3 | Status: SHIPPED | OUTPATIENT
Start: 2024-10-02 | End: 2025-09-27

## 2024-11-29 SDOH — ECONOMIC STABILITY: TRANSPORTATION INSECURITY
IN THE PAST 12 MONTHS, HAS LACK OF TRANSPORTATION KEPT YOU FROM MEETINGS, WORK, OR FROM GETTING THINGS NEEDED FOR DAILY LIVING?: NO

## 2024-11-29 SDOH — ECONOMIC STABILITY: INCOME INSECURITY: HOW HARD IS IT FOR YOU TO PAY FOR THE VERY BASICS LIKE FOOD, HOUSING, MEDICAL CARE, AND HEATING?: NOT HARD AT ALL

## 2024-11-29 SDOH — HEALTH STABILITY: PHYSICAL HEALTH: ON AVERAGE, HOW MANY MINUTES DO YOU ENGAGE IN EXERCISE AT THIS LEVEL?: 30 MIN

## 2024-11-29 SDOH — ECONOMIC STABILITY: TRANSPORTATION INSECURITY
IN THE PAST 12 MONTHS, HAS THE LACK OF TRANSPORTATION KEPT YOU FROM MEDICAL APPOINTMENTS OR FROM GETTING MEDICATIONS?: NO

## 2024-11-29 SDOH — ECONOMIC STABILITY: INCOME INSECURITY: IN THE LAST 12 MONTHS, WAS THERE A TIME WHEN YOU WERE NOT ABLE TO PAY THE MORTGAGE OR RENT ON TIME?: NO

## 2024-11-29 SDOH — ECONOMIC STABILITY: FOOD INSECURITY: WITHIN THE PAST 12 MONTHS, THE FOOD YOU BOUGHT JUST DIDN'T LAST AND YOU DIDN'T HAVE MONEY TO GET MORE.: NEVER TRUE

## 2024-11-29 SDOH — ECONOMIC STABILITY: FOOD INSECURITY: WITHIN THE PAST 12 MONTHS, YOU WORRIED THAT YOUR FOOD WOULD RUN OUT BEFORE YOU GOT MONEY TO BUY MORE.: NEVER TRUE

## 2024-11-29 SDOH — ECONOMIC STABILITY: HOUSING INSECURITY
IN THE LAST 12 MONTHS, WAS THERE A TIME WHEN YOU DID NOT HAVE A STEADY PLACE TO SLEEP OR SLEPT IN A SHELTER (INCLUDING NOW)?: NO

## 2024-11-29 SDOH — ECONOMIC STABILITY: TRANSPORTATION INSECURITY
IN THE PAST 12 MONTHS, HAS LACK OF RELIABLE TRANSPORTATION KEPT YOU FROM MEDICAL APPOINTMENTS, MEETINGS, WORK OR FROM GETTING THINGS NEEDED FOR DAILY LIVING?: NO

## 2024-11-29 SDOH — HEALTH STABILITY: PHYSICAL HEALTH: ON AVERAGE, HOW MANY DAYS PER WEEK DO YOU ENGAGE IN MODERATE TO STRENUOUS EXERCISE (LIKE A BRISK WALK)?: 2 DAYS

## 2024-11-29 SDOH — HEALTH STABILITY: MENTAL HEALTH
STRESS IS WHEN SOMEONE FEELS TENSE, NERVOUS, ANXIOUS, OR CAN'T SLEEP AT NIGHT BECAUSE THEIR MIND IS TROUBLED. HOW STRESSED ARE YOU?: ONLY A LITTLE

## 2024-11-29 ASSESSMENT — SOCIAL DETERMINANTS OF HEALTH (SDOH)
DO YOU BELONG TO ANY CLUBS OR ORGANIZATIONS SUCH AS CHURCH GROUPS UNIONS, FRATERNAL OR ATHLETIC GROUPS, OR SCHOOL GROUPS?: NO
IN THE PAST 12 MONTHS, HAS THE ELECTRIC, GAS, OIL, OR WATER COMPANY THREATENED TO SHUT OFF SERVICE IN YOUR HOME?: NO
HOW OFTEN DO YOU ATTEND CHURCH OR RELIGIOUS SERVICES?: NEVER
HOW OFTEN DO YOU GET TOGETHER WITH FRIENDS OR RELATIVES?: THREE TIMES A WEEK
HOW OFTEN DO YOU ATTEND CHURCH OR RELIGIOUS SERVICES?: NEVER
HOW OFTEN DO YOU ATTENT MEETINGS OF THE CLUB OR ORGANIZATION YOU BELONG TO?: NEVER
IN A TYPICAL WEEK, HOW MANY TIMES DO YOU TALK ON THE PHONE WITH FAMILY, FRIENDS, OR NEIGHBORS?: THREE TIMES A WEEK
HOW OFTEN DO YOU HAVE A DRINK CONTAINING ALCOHOL: MONTHLY OR LESS
HOW OFTEN DO YOU GET TOGETHER WITH FRIENDS OR RELATIVES?: THREE TIMES A WEEK
DO YOU BELONG TO ANY CLUBS OR ORGANIZATIONS SUCH AS CHURCH GROUPS UNIONS, FRATERNAL OR ATHLETIC GROUPS, OR SCHOOL GROUPS?: NO
IN A TYPICAL WEEK, HOW MANY TIMES DO YOU TALK ON THE PHONE WITH FAMILY, FRIENDS, OR NEIGHBORS?: THREE TIMES A WEEK
HOW OFTEN DO YOU HAVE SIX OR MORE DRINKS ON ONE OCCASION: NEVER
HOW HARD IS IT FOR YOU TO PAY FOR THE VERY BASICS LIKE FOOD, HOUSING, MEDICAL CARE, AND HEATING?: NOT HARD AT ALL
HOW OFTEN DO YOU ATTENT MEETINGS OF THE CLUB OR ORGANIZATION YOU BELONG TO?: NEVER
WITHIN THE PAST 12 MONTHS, YOU WORRIED THAT YOUR FOOD WOULD RUN OUT BEFORE YOU GOT THE MONEY TO BUY MORE: NEVER TRUE

## 2024-11-29 ASSESSMENT — LIFESTYLE VARIABLES
HOW OFTEN DO YOU HAVE SIX OR MORE DRINKS ON ONE OCCASION: NEVER
HOW OFTEN DO YOU HAVE A DRINK CONTAINING ALCOHOL: MONTHLY OR LESS

## 2024-12-02 ENCOUNTER — APPOINTMENT (OUTPATIENT)
Dept: MEDICAL GROUP | Facility: LAB | Age: 79
End: 2024-12-02
Payer: MEDICARE

## 2024-12-02 VITALS
TEMPERATURE: 97.1 F | OXYGEN SATURATION: 94 % | WEIGHT: 147.49 LBS | DIASTOLIC BLOOD PRESSURE: 72 MMHG | HEIGHT: 63 IN | SYSTOLIC BLOOD PRESSURE: 118 MMHG | HEART RATE: 71 BPM | BODY MASS INDEX: 26.13 KG/M2

## 2024-12-02 DIAGNOSIS — Z00.00 ENCOUNTER FOR MEDICARE ANNUAL WELLNESS EXAM: ICD-10-CM

## 2024-12-02 DIAGNOSIS — E28.39 ESTROGEN DEFICIENCY: ICD-10-CM

## 2024-12-02 DIAGNOSIS — M81.0 OSTEOPOROSIS, UNSPECIFIED OSTEOPOROSIS TYPE, UNSPECIFIED PATHOLOGICAL FRACTURE PRESENCE: ICD-10-CM

## 2024-12-02 PROCEDURE — 3074F SYST BP LT 130 MM HG: CPT | Performed by: PHYSICIAN ASSISTANT

## 2024-12-02 PROCEDURE — G0439 PPPS, SUBSEQ VISIT: HCPCS | Performed by: PHYSICIAN ASSISTANT

## 2024-12-02 PROCEDURE — 3078F DIAST BP <80 MM HG: CPT | Performed by: PHYSICIAN ASSISTANT

## 2024-12-02 ASSESSMENT — FIBROSIS 4 INDEX: FIB4 SCORE: 2

## 2024-12-02 ASSESSMENT — PATIENT HEALTH QUESTIONNAIRE - PHQ9: CLINICAL INTERPRETATION OF PHQ2 SCORE: 0

## 2024-12-02 NOTE — PROGRESS NOTES
Chief Complaint   Patient presents with    Follow-Up     Annual exam        HPI:  Alice Whitlock is a 79 y.o. 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  She notes that this condition typically tends to be flared by certain foods especially acidic/citrus foods as well as mentholated cough drops.  Symptoms are currently stable at this time    Patient is also due for updated DEXA scan    Patient Active Problem List    Diagnosis Date Noted    History of melanoma 07/30/2024    Pelvic hematoma, female 01/20/2024    Pubic ramus fracture, right, closed, initial encounter ([facility]) 01/18/2024    Closed nondisplaced fracture of anterior column of left acetabulum ([facility]) 01/18/2024    Closed fracture of distal end of left radius 01/18/2024    Malignant neoplasm of endometrium ([facility]) 10/06/2023    Recurrent major depressive disorder, in full remission ([facility]) 06/06/2023    Atherosclerosis of aorta ([facility]) 04/19/2023    Hyperparathyroidism ([facility]) 04/14/2023    Pain of right thumb 09/29/2022    Arthralgia of back 01/20/2022    History of uterine cancer 01/06/2022    Thyroid dysfunction 01/06/2022    Neuropathic pain, leg, left 10/02/2018    Primary localized osteoarthrosis of the hip 09/21/2018    Osteoporosis, post-menopausal 09/10/2018    Post-surgical hypothyroidism 09/10/2018    LBBB (left bundle branch block) 10/21/2016    Left knee pain 10/11/2016    FH: CAD (coronary artery disease) 08/19/2015    GERD (gastroesophageal reflux disease) 07/09/2014    Hyperlipidemia 07/09/2014    Mixed stress and urge urinary incontinence 07/09/2014    Chronic depression 07/08/2013    History of total knee arthroplasty 07/08/2013    Total knee replacement status, left 07/08/2013    OA (osteoarthritis) of knee 11/06/2012       Current Outpatient Medications   Medication Sig Dispense Refill    levothyroxine (SYNTHROID) 88 MCG Tab Take 1 Tablet by mouth every day for  360 days. 90 Tablet 3    vitamin D3 (CHOLECALCIFEROL) 1000 Unit (25 mcg) Tab Take 1,000 Units by mouth every day.      alendronate (FOSAMAX) 70 MG Tab TAKE 1 TABLET BY MOUTH ONCE A WEEK IN THE MORNING WITH A FULL GLASS OF WATER, 30 MINUTES BEFORE THE FIRST MEAL, BEVERAGE OR MEDICATION OF THE DAY. REMAIN UPRIGHT 12 Tablet 3    cyclobenzaprine (FLEXERIL) 5 mg tablet TAKE 1 TABLET BY MOUTH ONCE DAILY AS NEEDED FOR PAIN FOR MUSCLE SPASM 90 Tablet 1    gabapentin (NEURONTIN) 300 MG Cap Take 1 Capsule by mouth 2 times a day. 180 Capsule 0    rosuvastatin (CRESTOR) 10 MG Tab Take 1 Tablet by mouth every evening. 90 Tablet 3    sertraline (ZOLOFT) 100 MG Tab Take 1 Tablet by mouth every day. 90 Tablet 3    meloxicam (MOBIC) 15 MG tablet Take 1 tablet by mouth once daily 90 Tablet 3    ferrous sulfate 325 (65 Fe) MG tablet Take 1 Tablet by mouth every 48 hours. 30 Tablet     acetaminophen (TYLENOL) 325 MG Tab Take 2 Tablets by mouth every 6 hours as needed for Mild Pain or Fever. Do not exceed total acetaminophen dose of 4000mg in 24 hours 30 Tablet 0    omeprazole (PRILOSEC) 20 MG delayed-release capsule Take 1 Capsule by mouth every day.      polyethylene glycol/lytes (MIRALAX) Pack Take 1 Packet by mouth 1 time a day as needed (if sennosides and docusate ineffective after 24 hours).  3    diclofenac sodium (VOLTAREN) 1 % Gel       ascorbic acid (VITAMIN C) 1000 MG tablet Take  by mouth.      Fluticasone Propionate (FLONASE NA) Administer  into affected nostril(S).      Cyanocobalamin (VITAMIN B-12 PO) Take 1 Tab by mouth every day.      albuterol 108 (90 Base) MCG/ACT Aero Soln inhalation aerosol Inhale 2 Puffs every 6 hours as needed for Shortness of Breath. (Patient not taking: Reported on 12/2/2024) 8.5 g 0     No current facility-administered medications for this visit.          Current supplements as per medication list.     Allergies: Penicillins    Current social contact/activities: Still working    She  reports  that she has never smoked. She has never used smokeless tobacco. She reports current alcohol use. She reports that she does not use drugs.  Counseling given: Not Answered      ROS:    Gait: Uses no assistive device  Ostomy: No  Other tubes: No  Amputations: No  Chronic oxygen use: No  Last eye exam: 07/2025  Wears hearing aids: No   : Reports urinary leakage during the last 6 months that has not interfered at all with their daily activities or sleep.    Screening:    Depression Screening  Little interest or pleasure in doing things?  0 - not at all  Feeling down, depressed , or hopeless? 0 - not at all  Patient Health Questionnaire Score: 0     If depressive symptoms identified deferred to follow up visit unless specifically addressed in assessment and plan.    Interpretation of PHQ-9 Total Score   Score Severity   1-4 No Depression   5-9 Mild Depression   10-14 Moderate Depression   15-19 Moderately Severe Depression   20-27 Severe Depression    Screening for Cognitive Impairment  Do you or any of your friends or family members have any concern about your memory?  no  Three Minute Recall (Leader, Season, Table)  3/3    Fléix clock face with all 12 numbers and set the hands to show 10 minutes after 11.     pass  Cognitive concerns identified deferred for follow up unless specifically addressed in assessment and plan.    Fall Risk Assessment  Has the patient had two or more falls in the last year or any fall with injury in the last year?   no    Safety Assessment  Do you always wear your seatbelt?   yes  Any changes to home needed to function safely?  no  Difficulty hearing.   no  Patient counseled about all safety risks that were identified.    Functional Assessment ADLs  Are there any barriers preventing you from cooking for yourself or meeting nutritional needs?   .  no  Are there any barriers preventing you from driving safely or obtaining transportation?   .  no  Are there any barriers preventing you from using  a telephone or calling for help?     no  Are there any barriers preventing you from shopping?   .  no  Are there any barriers preventing you from taking care of your own finances?     no  Are there any barriers preventing you from managing your medications?     no  Are there any barriers preventing you from showering, bathing or dressing yourself?    no  Are there any barriers preventing you from doing housework or laundry?  no  Are there any barriers preventing you from using the toilet? no  Are you currently engaging in any exercise or physical activity?   .  yes    Self-Assessment of Health  What is your perception of your health?    good  Do you sleep more than six hours a night?    yes  In the past 7 days, how much did pain keep you from doing your normal work?    Occ episodes of lichen planus  Do you spend quality time with family or friends (virtually or in person)?    yes  Do you usually eat a heart healthy diet that constists of a variety of fruits, vegetables, whole grains and fiber?    yes  Do you eat foods high in fat and/or Fast Food more than three times per week?    no  How concerned are you that your medical conditions are not being well managed?    no  Are you worried that in the next 2 months, you may not have stable housing that you own, rent, or stay in as part of a household?    no    Advance Care Planning  Do you have an Advance Directive, Living Will, Durable Power of , or POLST?  yes                 Health Maintenance Summary            Ordered - Bone Density Scan (Every 5 Years) Ordered on 12/2/2024 12/19/2019  DS-BONE DENSITY STUDY (DEXA)    06/23/2011  DS-BONE DENSITY STUDY (DEXA)    09/29/2008  DS-BONE DENSITY STUDY (DEXA)              Postponed - COVID-19 Vaccine (7 - 2024-25 season) Postponed until 12/2/2025 09/28/2023  Imm Admin: PFIZER PURPLE CAP SARS-COV-2 VACCINATION (12+)    04/29/2023  Outside Immunization: COVID mRNA Bivalent(MOD)    09/20/2022  Imm Admin:  PFIZER BIVALENT SARS-COV-2 VACCINE (12+)    04/04/2022  Imm Admin: PFIZER BURNS CAP SARS-COV-2 VACCINATION (12+)    09/27/2021  Imm Admin: PFIZER PURPLE CAP SARS-COV-2 VACCINATION (12+)    Only the first 5 history entries have been loaded, but more history exists.              Mammogram (Yearly) Next due on 3/13/2025      03/13/2024  MA-SCREENING MAMMO BILAT W/IMPLANTS W/MUSHTAQ W/CAD    02/16/2023  MA-SCREENING MAMMO BILAT W/IMPLANTS W/MUSHTAQ W/CAD    02/16/2022  MA-DIAGNOSTIC MAMMO BILAT W/IMPLANTS W/MUSHTAQ W/CAD    01/04/2021  MA-SCREENING MAMMO BILAT W/IMPLANTS W/MUSHTAQ W/CAD    12/19/2019  MA-MAMMO SCREEN BILAT IMPLANTS MUSHTAQ CAD    Only the first 5 history entries have been loaded, but more history exists.              Annual Wellness Visit (Yearly) Next due on 12/2/2025 12/02/2024  Visit Dx: Encounter for Medicare annual wellness exam    01/26/2021 09/25/2019 09/11/2018                IMM DTaP/Tdap/Td Vaccine (3 - Td or Tdap) Next due on 8/19/2030 08/19/2020  Imm Admin: Tdap Vaccine    08/01/2015  Imm Admin: Tdap Vaccine              Hepatitis A Vaccine (Hep A) (Series Information) Aged Out      02/24/2017  Imm Admin: Hepatitis A Vaccine, Adult    08/24/2016  Imm Admin: Hepatitis A Vaccine, Adult              Pneumococcal Vaccine: 65+ Years (Series Information) Completed      09/16/2018  Imm Admin: Pneumococcal polysaccharide vaccine (PPSV-23)    10/25/2015  Imm Admin: Pneumococcal Conjugate Vaccine (Prevnar/PCV-13)              Zoster (Shingles) Vaccines (Series Information) Completed      11/26/2019  Imm Admin: Zoster Vaccine Recombinant (RZV) (SHINGRIX)    09/13/2019  Imm Admin: Zoster Vaccine Recombinant (RZV) (SHINGRIX)    01/01/2014  Imm Admin: Zoster Vaccine Live (ZVL) (Zostavax) - HISTORICAL DATA              Influenza Vaccine (Series Information) Completed      08/20/2024  Outside Immunization: Influenza, High Dose    09/28/2023  Imm Admin: Influenza Vaccine Adult HD    08/14/2022  Imm  Admin: Influenza Vaccine Adult HD    08/26/2021  Imm Admin: Influenza, Unspecified - HISTORICAL DATA    08/19/2020  Imm Admin: Influenza Vaccine Adult HD    Only the first 5 history entries have been loaded, but more history exists.              Hepatitis B Vaccine (Hep B) (Series Information) Aged Out      No completion history exists for this topic.              HPV Vaccines (Series Information) Aged Out      No completion history exists for this topic.              Polio Vaccine (Inactivated Polio) (Series Information) Aged Out      No completion history exists for this topic.              Meningococcal Immunization (Series Information) Aged Out      No completion history exists for this topic.              Discontinued - Hepatitis C Screening  Discontinued      No completion history exists for this topic.                    Patient Care Team:  Cecilia Latham P.A.-C. as PCP - General (Family Medicine)  Shahid Paez M.D. as Consulting Physician (Gynecologic Oncology)  Masood KLEIN M.D. as Consulting Physician (Radiation Oncology)  MATT Campbell (Gynecologic Oncology)        Social History     Tobacco Use    Smoking status: Never    Smokeless tobacco: Never   Vaping Use    Vaping status: Never Used   Substance Use Topics    Alcohol use: Yes     Comment: 2 a month    Drug use: No     Family History   Problem Relation Age of Onset    Heart Attack Mother     Heart Disease Mother     Prostate cancer Father     Heart Attack Sister     Heart Disease Brother     Heart Attack Brother     Colon Cancer Brother     Cancer Maternal Aunt         breast    Heart Disease Other     Cancer Other     Hypertension Other      She  has a past medical history of Arthritis, Cancer (HCC) (2015), Heart burn, LBBB (left bundle branch block), Pain, Psychiatric problem, Unspecified disorder of thyroid, and Unspecified urinary incontinence.   Past Surgical History:   Procedure Laterality Date    HYSTERECTOMY ROBOTIC XI   "01/17/2019    Procedure: HYSTERECTOMY ROBOTIC XI;  Surgeon: Shahid Paez M.D.;  Location: [facility];  Service: Gynecology    SALPINGECTOMY Bilateral 01/17/2019    Procedure: SALPINGECTOMY;  Surgeon: Shahid Paez M.D.;  Location: SUR[facility]ERY [facility];  Service: Gynecology    OOPHORECTOMY Bilateral 01/17/2019    Procedure: OOPHORECTOMY;  Surgeon: Shahid Paez M.D.;  Location: SUR[facility]ERY [facility];  Service: Gynecology    NODE BIOPSY SENTINEL  01/17/2019    Procedure: NODE BIOPSY SENTINEL;  Surgeon: Shahid Paez M.D.;  Location: [facility]ERY [facility];  Service: Gynecology    KNEE ARTHROPLASTY TOTAL Left 10/11/2016    Procedure: KNEE ARTHROPLASTY TOTAL;  Surgeon: Beverly Chauhan M.D.;  Location: [facility];  Service:     KNEE ARTHROPLASTY TOTAL  11/06/2012    Performed by Beverly Chauhan M.D. at [facility]    KNEE ARTHROSCOPY  01/18/2011    Performed by MARILEE NAGEL at [facility]    MENISCECTOMY, KNEE, MEDIAL  01/18/2011    Performed by MARILEE NAGEL at [facility]    MENISCECTOMY  01/18/2011    Performed by MARILEE NAGEL at [facility]    SYNOVECTOMY  01/18/2011    Performed by MARILEE NAGEL at [facility]    BLADDER SUSPENSION  2000    THYROIDECTOMY TOTAL  1990    MAMMOPLASTY AUGMENTATION  1981    LA BREAST AUGMENTATION WITH IMPLANT  1980    OTHER SURGICAL PROCEDURE  1979    excision thyroid nodules    LAPAROTOMY  1972    endometriosis    TONSILLECTOMY  1957    CATARACT EXTRACTION WITH IOL      2023    WRIST ARTHROSCOPY      carpal tunnel       Exam:   /72 (BP Location: Left arm, Patient Position: Sitting, BP Cuff Size: Adult)   Pulse 71   Temp 36.2 °C (97.1 °F) (Temporal)   Ht 1.6 m (5' 3\")   Wt 66.9 kg (147 lb 7.8 oz)   SpO2 94%  Body mass index is 26.13 kg/m².    Hearing excellent.    Dentition good  Alert, oriented in no acute distress.  Eye contact is good, speech goal directed, affect " calm    Assessment and Plan. The following treatment and monitoring plan is recommended:    1. Encounter for Medicare annual wellness exam    2. Estrogen deficiency  - DS-BONE DENSITY STUDY (DEXA); Future    3. Osteoporosis, unspecified osteoporosis type, unspecified pathological fracture presence  - DS-BONE DENSITY STUDY (DEXA); Future      Services suggested: No services needed at this time  Health Care Screening: Age-appropriate preventive services recommended by USPTF and ACIP covered by Medicare were discussed today. Services ordered if indicated and agreed upon by the patient.  Referrals offered: Community-based lifestyle interventions to reduce health risks and promote self-management and wellness, fall prevention, nutrition, physical activity, tobacco-use cessation, weight loss, and mental health services as per orders if indicated.    Discussion today about general wellness and lifestyle habits:    Prevent falls and reduce trip hazards; Cautioned about securing or removing rugs.  Have a working fire alarm and carbon monoxide detector;   Engage in regular physical activity and social activities     Follow-up: No follow-ups on file.

## 2025-01-31 DIAGNOSIS — M79.2 NEUROPATHIC PAIN, LEG, LEFT: ICD-10-CM

## 2025-01-31 RX ORDER — GABAPENTIN 300 MG/1
300 CAPSULE ORAL 2 TIMES DAILY
Qty: 180 CAPSULE | Refills: 0 | Status: SHIPPED | OUTPATIENT
Start: 2025-01-31

## 2025-02-06 DIAGNOSIS — M54.42 CHRONIC RIGHT-SIDED LOW BACK PAIN WITH BILATERAL SCIATICA: ICD-10-CM

## 2025-02-06 DIAGNOSIS — G89.29 CHRONIC RIGHT-SIDED LOW BACK PAIN WITH BILATERAL SCIATICA: ICD-10-CM

## 2025-02-06 DIAGNOSIS — M54.41 CHRONIC RIGHT-SIDED LOW BACK PAIN WITH BILATERAL SCIATICA: ICD-10-CM

## 2025-02-07 RX ORDER — MELOXICAM 15 MG/1
15 TABLET ORAL DAILY
Qty: 90 TABLET | Refills: 3 | Status: SHIPPED | OUTPATIENT
Start: 2025-02-07

## 2025-03-19 ENCOUNTER — HOSPITAL ENCOUNTER (OUTPATIENT)
Dept: RADIOLOGY | Facility: MEDICAL CENTER | Age: 80
End: 2025-03-19
Attending: PHYSICIAN ASSISTANT
Payer: MEDICARE

## 2025-03-19 DIAGNOSIS — Z12.31 ENCOUNTER FOR SCREENING MAMMOGRAM FOR BREAST CANCER: ICD-10-CM

## 2025-03-19 PROCEDURE — 77067 SCR MAMMO BI INCL CAD: CPT

## 2025-03-25 ENCOUNTER — RESULTS FOLLOW-UP (OUTPATIENT)
Dept: MEDICAL GROUP | Facility: LAB | Age: 80
End: 2025-03-25

## 2025-04-05 ENCOUNTER — APPOINTMENT (OUTPATIENT)
Dept: RADIOLOGY | Facility: MEDICAL CENTER | Age: 80
End: 2025-04-05
Attending: PHYSICIAN ASSISTANT
Payer: MEDICARE

## 2025-04-08 ENCOUNTER — HOSPITAL ENCOUNTER (OUTPATIENT)
Dept: LAB | Facility: MEDICAL CENTER | Age: 80
End: 2025-04-08
Attending: PHYSICIAN ASSISTANT
Payer: MEDICARE

## 2025-04-08 LAB
ANION GAP SERPL CALC-SCNC: 10 MMOL/L (ref 7–16)
BUN SERPL-MCNC: 16 MG/DL (ref 8–22)
CALCIUM SERPL-MCNC: 10.3 MG/DL (ref 8.5–10.5)
CHLORIDE SERPL-SCNC: 106 MMOL/L (ref 96–112)
CO2 SERPL-SCNC: 24 MMOL/L (ref 20–33)
CREAT SERPL-MCNC: 0.71 MG/DL (ref 0.5–1.4)
GFR SERPLBLD CREATININE-BSD FMLA CKD-EPI: 87 ML/MIN/1.73 M 2
GLUCOSE SERPL-MCNC: 100 MG/DL (ref 65–99)
POTASSIUM SERPL-SCNC: 4.4 MMOL/L (ref 3.6–5.5)
SODIUM SERPL-SCNC: 140 MMOL/L (ref 135–145)

## 2025-04-08 PROCEDURE — 80048 BASIC METABOLIC PNL TOTAL CA: CPT

## 2025-04-08 PROCEDURE — 36415 COLL VENOUS BLD VENIPUNCTURE: CPT

## 2025-04-10 ENCOUNTER — HOSPITAL ENCOUNTER (OUTPATIENT)
Dept: RADIOLOGY | Facility: MEDICAL CENTER | Age: 80
End: 2025-04-10
Attending: PHYSICIAN ASSISTANT
Payer: MEDICARE

## 2025-04-10 DIAGNOSIS — R32 FEMALE INCONTINENCE: ICD-10-CM

## 2025-04-10 DIAGNOSIS — C54.1 MALIGNANT NEOPLASM OF ENDOMETRIUM (HCC): ICD-10-CM

## 2025-04-10 PROCEDURE — 74177 CT ABD & PELVIS W/CONTRAST: CPT

## 2025-04-10 PROCEDURE — 700117 HCHG RX CONTRAST REV CODE 255: Performed by: PHYSICIAN ASSISTANT

## 2025-04-10 RX ADMIN — IOHEXOL 25 ML: 240 INJECTION, SOLUTION INTRATHECAL; INTRAVASCULAR; INTRAVENOUS; ORAL at 18:30

## 2025-04-16 DIAGNOSIS — M54.41 CHRONIC RIGHT-SIDED LOW BACK PAIN WITH BILATERAL SCIATICA: ICD-10-CM

## 2025-04-16 DIAGNOSIS — M54.42 CHRONIC RIGHT-SIDED LOW BACK PAIN WITH BILATERAL SCIATICA: ICD-10-CM

## 2025-04-16 DIAGNOSIS — G89.29 CHRONIC RIGHT-SIDED LOW BACK PAIN WITH BILATERAL SCIATICA: ICD-10-CM

## 2025-04-18 RX ORDER — CYCLOBENZAPRINE HCL 5 MG
TABLET ORAL
Qty: 90 TABLET | Refills: 0 | Status: SHIPPED | OUTPATIENT
Start: 2025-04-18

## 2025-04-24 ENCOUNTER — OFFICE VISIT (OUTPATIENT)
Dept: URGENT CARE | Facility: CLINIC | Age: 80
End: 2025-04-24
Payer: MEDICARE

## 2025-04-24 VITALS
BODY MASS INDEX: 24.13 KG/M2 | RESPIRATION RATE: 14 BRPM | OXYGEN SATURATION: 96 % | TEMPERATURE: 99.1 F | HEART RATE: 64 BPM | HEIGHT: 63 IN | WEIGHT: 136.2 LBS | SYSTOLIC BLOOD PRESSURE: 120 MMHG | DIASTOLIC BLOOD PRESSURE: 68 MMHG

## 2025-04-24 DIAGNOSIS — M79.662 PAIN AND SWELLING OF LEFT LOWER LEG: ICD-10-CM

## 2025-04-24 DIAGNOSIS — M79.89 PAIN AND SWELLING OF LEFT LOWER LEG: ICD-10-CM

## 2025-04-24 DIAGNOSIS — S80.12XA LEG HEMATOMA, LEFT, INITIAL ENCOUNTER: ICD-10-CM

## 2025-04-24 PROCEDURE — 3078F DIAST BP <80 MM HG: CPT | Performed by: PHYSICIAN ASSISTANT

## 2025-04-24 PROCEDURE — 99213 OFFICE O/P EST LOW 20 MIN: CPT | Performed by: PHYSICIAN ASSISTANT

## 2025-04-24 PROCEDURE — 3074F SYST BP LT 130 MM HG: CPT | Performed by: PHYSICIAN ASSISTANT

## 2025-04-24 ASSESSMENT — FIBROSIS 4 INDEX: FIB4 SCORE: 2

## 2025-04-25 ENCOUNTER — RESULTS FOLLOW-UP (OUTPATIENT)
Dept: URGENT CARE | Facility: PHYSICIAN GROUP | Age: 80
End: 2025-04-25

## 2025-04-25 ENCOUNTER — APPOINTMENT (OUTPATIENT)
Dept: RADIOLOGY | Facility: IMAGING CENTER | Age: 80
End: 2025-04-25
Attending: PHYSICIAN ASSISTANT
Payer: MEDICARE

## 2025-04-25 DIAGNOSIS — M79.662 PAIN AND SWELLING OF LEFT LOWER LEG: ICD-10-CM

## 2025-04-25 DIAGNOSIS — M79.89 PAIN AND SWELLING OF LEFT LOWER LEG: ICD-10-CM

## 2025-04-25 PROCEDURE — 73590 X-RAY EXAM OF LOWER LEG: CPT | Mod: TC,FY,LT | Performed by: RADIOLOGY

## 2025-04-25 NOTE — PROGRESS NOTES
"Subjective:   Alice Whitlock is a 79 y.o. female who presents for Fall (X 1 day, Lt leg injury due a slip and fall on some rocks, bruising and swelling.)      HPI  The patient presents to the Urgent Care with complaints of left lower leg bruising and swelling onset yesterday.  She was climbing down her retaining wall and it was slanted and when she accidentally slipped to the left lower leg.  She states the leg did not hit the ground hard but just slipped.  She denies any other injury.  Did not think much of it until she noticed bruising and swelling later on last night.  Some slight pain but nothing severe.  Feels the pain somewhat with weightbearing but not unbearable.  Bruising has worsened.  She is not on blood thinners.  Denies any numbness or tingling.  No other complaints.        Past Medical History:   Diagnosis Date    Arthritis     osteoarthritis    Cancer (HCC) 2015    skin (non melanoma)    Heart burn     occasional    LBBB (left bundle branch block)     no cardiologist     Pain     knee,  joints    Psychiatric problem     depression    Unspecified disorder of thyroid     thyroid nodules=thyroidectomy    Unspecified urinary incontinence      Allergies   Allergen Reactions    Penicillins      Reaction unknown \"fever\"        Objective:     /68   Pulse 64   Temp 37.3 °C (99.1 °F) (Temporal)   Resp 14   Ht 1.6 m (5' 3\")   Wt 61.8 kg (136 lb 3.2 oz)   SpO2 96%   BMI 24.13 kg/m²     Physical Exam  Vitals reviewed.   Constitutional:       General: She is not in acute distress.     Appearance: Normal appearance. She is not ill-appearing or toxic-appearing.   Eyes:      Conjunctiva/sclera: Conjunctivae normal.   Cardiovascular:      Rate and Rhythm: Normal rate.   Pulmonary:      Effort: Pulmonary effort is normal.   Musculoskeletal:      Cervical back: Neck supple.        Legs:       Comments: Left proximal anterolateral lower leg there is a moderate sized palpable hematoma with significant " surrounding bruising and swelling. Small scattered superficial abrasions with scabbing to anterior lower leg. No significant erythema. No proximal streaking. No discharge or drainage. No obvious bony deformity. Normal knee examination. Full ROM of the knee. Slight antalgic gait    Skin:     General: Skin is dry.   Neurological:      General: No focal deficit present.      Mental Status: She is alert and oriented to person, place, and time.   Psychiatric:         Mood and Affect: Mood normal.         Behavior: Behavior normal.       Diagnosis and associated orders:     1. Pain and swelling of left lower leg  - DX-TIBIA AND FIBULA LEFT; Future    2. Leg hematoma, left, initial encounter       Comments/MDM:     Lower suspicion for fracture, however we agreed to further evaluate with x-ray.  No available x-ray currently here in the clinic.  Patient would like to get x-ray tomorrow morning.  She is going to come in for [facility] in the morning to get the x-ray done.  I will call with results.  In the meantime, recommend elevation, ice application, Tylenol.  Rest.       I personally reviewed prior external notes and test results pertinent to today's visit. Pathogenesis of diagnosis discussed including typical length and natural progression. Supportive care, natural history, differential diagnoses, and indications for immediate follow-up discussed. Patient expresses understanding and agrees to plan. Patient denies any other questions or concerns.     Follow-up with the primary care physician for recheck, reevaluation, and consideration of further management.    Please note that this dictation was created using voice recognition software. I have made a reasonable attempt to correct obvious errors, but I expect that there are errors of grammar and possibly content that I did not discover before finalizing the note.    This note was electronically signed by Miguel Juan PA-C

## 2025-04-29 ENCOUNTER — OFFICE VISIT (OUTPATIENT)
Dept: URGENT CARE | Facility: CLINIC | Age: 80
End: 2025-04-29
Payer: MEDICARE

## 2025-04-29 VITALS
HEIGHT: 63 IN | BODY MASS INDEX: 24.2 KG/M2 | DIASTOLIC BLOOD PRESSURE: 68 MMHG | SYSTOLIC BLOOD PRESSURE: 124 MMHG | HEART RATE: 70 BPM | WEIGHT: 136.6 LBS | TEMPERATURE: 98 F | RESPIRATION RATE: 16 BRPM | OXYGEN SATURATION: 95 %

## 2025-04-29 DIAGNOSIS — M79.662 PAIN AND SWELLING OF LEFT LOWER LEG: ICD-10-CM

## 2025-04-29 DIAGNOSIS — S80.12XD HEMATOMA OF LEFT LOWER EXTREMITY, SUBSEQUENT ENCOUNTER: ICD-10-CM

## 2025-04-29 DIAGNOSIS — M79.89 PAIN AND SWELLING OF LEFT LOWER LEG: ICD-10-CM

## 2025-04-29 DIAGNOSIS — M79.2 NEUROPATHIC PAIN, LEG, LEFT: ICD-10-CM

## 2025-04-29 DIAGNOSIS — L03.116 CELLULITIS OF LEFT LOWER EXTREMITY: ICD-10-CM

## 2025-04-29 RX ORDER — DOXYCYCLINE 100 MG/1
100 CAPSULE ORAL 2 TIMES DAILY
Qty: 14 CAPSULE | Refills: 0 | Status: SHIPPED | OUTPATIENT
Start: 2025-04-29 | End: 2025-05-06

## 2025-04-29 RX ORDER — GABAPENTIN 300 MG/1
300 CAPSULE ORAL 2 TIMES DAILY
Qty: 180 CAPSULE | Refills: 0 | Status: SHIPPED | OUTPATIENT
Start: 2025-04-29

## 2025-04-29 ASSESSMENT — ENCOUNTER SYMPTOMS
FEVER: 0
WEAKNESS: 0
DIARRHEA: 0
FALLS: 1
LEG SWELLING: 1
VOMITING: 0
NAUSEA: 0
COUGH: 0
SHORTNESS OF BREATH: 0
DIZZINESS: 0

## 2025-04-29 ASSESSMENT — FIBROSIS 4 INDEX: FIB4 SCORE: 2

## 2025-04-29 ASSESSMENT — PAIN SCALES - GENERAL: PAINLEVEL_OUTOF10: 4=SLIGHT-MODERATE PAIN

## 2025-04-29 NOTE — PROGRESS NOTES
"Subjective     Ulisses Whitlock is a 79 y.o. female who presents with Leg Swelling (Pt following up from previous visit 4/24 for L leg swelling. Pt reports possible increase in swelling. )            Ulisses is here today regarding worsening left lower leg swelling.  She was seen at urgent care on 4/24/2025 after falling in her yard she had a negative tib-fib x-ray.  She notes that she has been continuing her activities as usual however she is having pain and tenderness in the front of her leg and there has been significant bruising that is spread down her entire leg and into her foot.  She denies any history of blood clots.  She notes she only takes a baby aspirin and no other blood thinners.     Leg Swelling  This is a recurrent problem. The current episode started in the past 7 days. The problem has been gradually worsening. Pertinent negatives include no chest pain, congestion, coughing, fever, nausea, rash, vomiting or weakness. Associated symptoms comments: No SOB, no palpitations. The symptoms are aggravated by standing and walking. Treatments tried: arnica. The treatment provided mild relief.       Review of Systems   Constitutional:  Negative for fever and malaise/fatigue.   HENT:  Negative for congestion.    Respiratory:  Negative for cough and shortness of breath.    Cardiovascular:  Negative for chest pain.   Gastrointestinal:  Negative for diarrhea, nausea and vomiting.   Musculoskeletal:  Positive for falls.        LLE pain and swelling   Skin:  Negative for rash.   Neurological:  Negative for dizziness and weakness.   All other systems reviewed and are negative.             Objective     /68   Pulse 70   Temp 36.7 °C (98 °F) (Temporal)   Resp 16   Ht 1.6 m (5' 3\")   Wt 62 kg (136 lb 9.6 oz)   SpO2 95%   BMI 24.20 kg/m²      Physical Exam  Vitals reviewed.   Constitutional:       Appearance: Normal appearance.   HENT:      Head: Normocephalic.      Nose: Nose normal.   Eyes:      Extraocular " Movements: Extraocular movements intact.      Conjunctiva/sclera: Conjunctivae normal.   Cardiovascular:      Rate and Rhythm: Normal rate and regular rhythm.      Pulses: Normal pulses.      Heart sounds: Normal heart sounds.   Pulmonary:      Effort: Pulmonary effort is normal.      Breath sounds: Normal breath sounds.   Musculoskeletal:         General: Normal range of motion.      Left lower leg: Swelling and tenderness present. Edema present.        Legs:       Comments: Approximately 5 cm hematoma with overlying abrasion that is warm to the touch, ecchymosis of entire left lower extremity below knee   Skin:     General: Skin is warm and dry.   Neurological:      Mental Status: She is alert and oriented to person, place, and time.   Psychiatric:         Behavior: Behavior normal.                      Assessment & Plan  Cellulitis of left lower extremity    Orders:    doxycycline (MONODOX) 100 MG capsule; Take 1 Capsule by mouth 2 times a day for 7 days.    Hematoma of left lower extremity, subsequent encounter         Pain and swelling of left lower leg    Orders:    US-EXTREMITY VENOUS LOWER UNILAT LEFT; Future       At this time we will treat for cellulitis given her abrasion, and edema and erythema on and around the hematoma.  Additionally will rule out DVT with ultrasound.  Will update via Chanyoujit with the results.    Discussed that as the hematoma heals she may have worsening bruising below the hematoma site.  Encouraged her to continue to monitor this.  She can utilize heat packs, massage, Arnica cream as necessary.  Encouraged her to elevate her left lower extremity whenever she is seated.    Shared decision-making was utilized with Ulisses for plan of care. Discussed differential diagnoses, natural history, and supportive care. Reviewed indication for use and the potential benefits and side effects of medications. Ulisses was encouraged to monitor symptoms closely and educated about signs and symptoms that  would warrant concern and mandate seeking a higher level of service through the emergency department discussed at length. All questions answered to Ulisses's satisfaction and they were in agreement with treatment plan at time of discharge.

## 2025-04-30 ENCOUNTER — HOSPITAL ENCOUNTER (OUTPATIENT)
Dept: RADIOLOGY | Facility: MEDICAL CENTER | Age: 80
End: 2025-04-30
Payer: MEDICARE

## 2025-04-30 DIAGNOSIS — M79.662 PAIN AND SWELLING OF LEFT LOWER LEG: ICD-10-CM

## 2025-04-30 DIAGNOSIS — M79.89 PAIN AND SWELLING OF LEFT LOWER LEG: ICD-10-CM

## 2025-04-30 PROCEDURE — 93971 EXTREMITY STUDY: CPT | Mod: LT

## 2025-04-30 PROCEDURE — 93971 EXTREMITY STUDY: CPT | Mod: 26,LT | Performed by: INTERNAL MEDICINE

## 2025-05-02 ENCOUNTER — RESULTS FOLLOW-UP (OUTPATIENT)
Dept: URGENT CARE | Facility: CLINIC | Age: 80
End: 2025-05-02

## 2025-07-22 DIAGNOSIS — M79.2 NEUROPATHIC PAIN, LEG, LEFT: ICD-10-CM

## 2025-07-23 RX ORDER — GABAPENTIN 300 MG/1
300 CAPSULE ORAL 2 TIMES DAILY
Qty: 180 CAPSULE | Refills: 0 | Status: SHIPPED | OUTPATIENT
Start: 2025-07-23

## 2025-08-05 RX ORDER — SERTRALINE HYDROCHLORIDE 100 MG/1
100 TABLET, FILM COATED ORAL DAILY
Qty: 90 TABLET | Refills: 0 | Status: SHIPPED | OUTPATIENT
Start: 2025-08-05

## 2025-08-07 RX ORDER — ALENDRONATE SODIUM 70 MG/1
TABLET ORAL
Qty: 12 TABLET | Refills: 3 | Status: SHIPPED | OUTPATIENT
Start: 2025-08-07

## (undated) DEVICE — NEEDLE DRIVER LARGE DA VINCI 10X'S REUSABLE

## (undated) DEVICE — GLOVE BIOGEL PI INDICATOR SZ 6.5 SURGICAL PF LF - (50/BX 4BX/CA)

## (undated) DEVICE — GOWN SURGEONS X-LARGE - DISP. (30/CA)

## (undated) DEVICE — GOWN WARMING STANDARD FLEX - (30/CA)

## (undated) DEVICE — TUBE E-T HI-LO CUFF 7.0MM (10EA/PK)

## (undated) DEVICE — SLEEVE, VASO, THIGH, MED

## (undated) DEVICE — ELECTRODE DUAL RETURN W/ CORD - (50/PK)

## (undated) DEVICE — CHLORAPREP 26 ML APPLICATOR - ORANGE TINT(25/CA)

## (undated) DEVICE — CANISTER SUCTION 3000ML MECHANICAL FILTER AUTO SHUTOFF MEDI-VAC NONSTERILE LF DISP  (40EA/CA)

## (undated) DEVICE — GLOVE SZ 6.5 BIOGEL PI MICRO - PF LF (50PR/BX)

## (undated) DEVICE — SUTURE 3-0 SILK SH 30 (36PK/BX)

## (undated) DEVICE — ROBOTIC SURGERY SERVICES

## (undated) DEVICE — TUBE NG SALEM SUMP 16FR (50EA/CA)

## (undated) DEVICE — SET EXTENSION WITH 2 PORTS (48EA/CA) ***PART #2C8610 IS A SUBSTITUTE*****

## (undated) DEVICE — KIT ANESTHESIA W/CIRCUIT & 3/LT BAG W/FILTER (20EA/CA)

## (undated) DEVICE — ARMREST CRADLE FOAM - (2PR/PK 12PR/CA)

## (undated) DEVICE — SET SUCTION/IRRIGATION WITH DISPOSABLE TIP (6/CA )PART #0250-070-520 IS A SUB

## (undated) DEVICE — SET LEADWIRE 5 LEAD BEDSIDE DISPOSABLE ECG (1SET OF 5/EA)

## (undated) DEVICE — SUCTION INSTRUMENT YANKAUER BULBOUS TIP W/O VENT (50EA/CA)

## (undated) DEVICE — TROCAR Z THREAD 12 X 100 - BLADED (6/BX)

## (undated) DEVICE — PAD OR TABLE DA VINCI 2IN X 20IN X 72IN - (12EA/CA)

## (undated) DEVICE — TUBING CLEARLINK DUO-VENT - C-FLO (48EA/CA)

## (undated) DEVICE — SYRINGE ASEPTO - (50EA/CA

## (undated) DEVICE — FORCEPS MARYLAND BIPOLAR DA VINCI 10X'S REUSABLE

## (undated) DEVICE — GLOVE BIOGEL PI ORTHO SZ 7 PF LF (40PR/BX)

## (undated) DEVICE — MASK ANESTHESIA ADULT  - (100/CA)

## (undated) DEVICE — SYRINGE SAFETY 10 ML 18 GA X 1 1/2 BLUNT LL (100/BX 4BX/CA)

## (undated) DEVICE — NEEDLE DRIVER MEGA SUTURECUT DA VINCI 15X'S REUSABLE

## (undated) DEVICE — PACK GYN DAVINCI (2EA/CA)

## (undated) DEVICE — DRAPE ARM  BOX OF 20

## (undated) DEVICE — SUTURE 3-0 MONOCRYL PLUS PS-1 - 27 INCH (36/BX)

## (undated) DEVICE — LACTATED RINGERS INJ 1000 ML - (14EA/CA 60CA/PF)

## (undated) DEVICE — DRAPE COLUMN  BOX OF 20

## (undated) DEVICE — FORCEPS PROGRASP DA VINCI 10X'S REUSABLE

## (undated) DEVICE — NEEDLE SPINAL NON-SAFETY 18 GA X 3 IN (25EA/BX)

## (undated) DEVICE — ELECTRODE 850 FOAM ADHESIVE - HYDROGEL RADIOTRNSPRNT (50/PK)

## (undated) DEVICE — SPATULA PERMANENT CAUTERY DA VINCI 10X'S REUSABLE

## (undated) DEVICE — SUTURE GENERAL

## (undated) DEVICE — SENSOR SPO2 NEO LNCS ADHESIVE (20/BX) SEE USER NOTES

## (undated) DEVICE — SEAL 5MM-8MM UNIVERSAL  BOX OF 10

## (undated) DEVICE — BLADE SURGICAL CLIPPER - (50EA/CA)

## (undated) DEVICE — NEEDLE INSUFFLATION FOR STEP - (12/BX)

## (undated) DEVICE — KIT ROOM DECONTAMINATION

## (undated) DEVICE — SUTURE QUILL 0 PDO 14X14 - (12/BX)